# Patient Record
Sex: FEMALE | Race: WHITE | NOT HISPANIC OR LATINO | URBAN - METROPOLITAN AREA
[De-identification: names, ages, dates, MRNs, and addresses within clinical notes are randomized per-mention and may not be internally consistent; named-entity substitution may affect disease eponyms.]

---

## 2017-06-13 ENCOUNTER — OUTPATIENT (OUTPATIENT)
Dept: OUTPATIENT SERVICES | Facility: HOSPITAL | Age: 82
LOS: 1 days | Discharge: HOME | End: 2017-06-13

## 2017-06-28 DIAGNOSIS — Z12.31 ENCOUNTER FOR SCREENING MAMMOGRAM FOR MALIGNANT NEOPLASM OF BREAST: ICD-10-CM

## 2018-04-11 ENCOUNTER — APPOINTMENT (OUTPATIENT)
Dept: OBGYN | Facility: CLINIC | Age: 83
End: 2018-04-11
Payer: MEDICARE

## 2018-04-11 VITALS
SYSTOLIC BLOOD PRESSURE: 100 MMHG | DIASTOLIC BLOOD PRESSURE: 60 MMHG | HEIGHT: 58 IN | WEIGHT: 183 LBS | BODY MASS INDEX: 38.41 KG/M2

## 2018-04-11 DIAGNOSIS — C06.9 MALIGNANT NEOPLASM OF MOUTH, UNSPECIFIED: ICD-10-CM

## 2018-04-11 LAB
BILIRUB UR QL STRIP: NORMAL
GLUCOSE UR-MCNC: NORMAL
HCG UR QL: NORMAL EU/DL
HGB UR QL STRIP.AUTO: NORMAL
KETONES UR-MCNC: NORMAL
LEUKOCYTE ESTERASE UR QL STRIP: 25
NITRITE UR QL STRIP: NORMAL
PH UR STRIP: 5
PROT UR STRIP-MCNC: NORMAL
SP GR UR STRIP: 1.02

## 2018-04-11 PROCEDURE — G0101: CPT

## 2018-04-11 PROCEDURE — 81003 URINALYSIS AUTO W/O SCOPE: CPT | Mod: QW

## 2018-06-18 ENCOUNTER — FORM ENCOUNTER (OUTPATIENT)
Age: 83
End: 2018-06-18

## 2018-06-19 ENCOUNTER — OUTPATIENT (OUTPATIENT)
Dept: OUTPATIENT SERVICES | Facility: HOSPITAL | Age: 83
LOS: 1 days | Discharge: HOME | End: 2018-06-19

## 2018-06-19 DIAGNOSIS — Z12.31 ENCOUNTER FOR SCREENING MAMMOGRAM FOR MALIGNANT NEOPLASM OF BREAST: ICD-10-CM

## 2018-12-05 ENCOUNTER — OUTPATIENT (OUTPATIENT)
Dept: OUTPATIENT SERVICES | Facility: HOSPITAL | Age: 83
LOS: 1 days | Discharge: HOME | End: 2018-12-05

## 2018-12-05 DIAGNOSIS — R79.9 ABNORMAL FINDING OF BLOOD CHEMISTRY, UNSPECIFIED: ICD-10-CM

## 2019-01-28 ENCOUNTER — OUTPATIENT (OUTPATIENT)
Dept: OUTPATIENT SERVICES | Facility: HOSPITAL | Age: 84
LOS: 1 days | Discharge: HOME | End: 2019-01-28

## 2019-01-28 DIAGNOSIS — D64.9 ANEMIA, UNSPECIFIED: ICD-10-CM

## 2019-04-22 ENCOUNTER — OUTPATIENT (OUTPATIENT)
Dept: OUTPATIENT SERVICES | Facility: HOSPITAL | Age: 84
LOS: 1 days | Discharge: HOME | End: 2019-04-22

## 2019-04-22 DIAGNOSIS — D64.9 ANEMIA, UNSPECIFIED: ICD-10-CM

## 2019-05-14 ENCOUNTER — APPOINTMENT (OUTPATIENT)
Dept: OBGYN | Facility: CLINIC | Age: 84
End: 2019-05-14

## 2019-07-07 ENCOUNTER — FORM ENCOUNTER (OUTPATIENT)
Age: 84
End: 2019-07-07

## 2019-07-08 ENCOUNTER — OUTPATIENT (OUTPATIENT)
Dept: OUTPATIENT SERVICES | Facility: HOSPITAL | Age: 84
LOS: 1 days | Discharge: HOME | End: 2019-07-08
Payer: MEDICARE

## 2019-07-08 DIAGNOSIS — Z12.31 ENCOUNTER FOR SCREENING MAMMOGRAM FOR MALIGNANT NEOPLASM OF BREAST: ICD-10-CM

## 2019-07-08 PROCEDURE — 77067 SCR MAMMO BI INCL CAD: CPT | Mod: 26

## 2019-07-08 PROCEDURE — 77063 BREAST TOMOSYNTHESIS BI: CPT | Mod: 26

## 2020-06-23 ENCOUNTER — APPOINTMENT (OUTPATIENT)
Dept: OBGYN | Facility: CLINIC | Age: 85
End: 2020-06-23
Payer: MEDICARE

## 2020-06-23 VITALS
BODY MASS INDEX: 40.76 KG/M2 | SYSTOLIC BLOOD PRESSURE: 120 MMHG | WEIGHT: 195 LBS | TEMPERATURE: 98 F | DIASTOLIC BLOOD PRESSURE: 80 MMHG

## 2020-06-23 DIAGNOSIS — Z01.419 ENCOUNTER FOR GYNECOLOGICAL EXAMINATION (GENERAL) (ROUTINE) W/OUT ABNORMAL FINDINGS: ICD-10-CM

## 2020-06-23 LAB
BILIRUB UR QL STRIP: NORMAL
GLUCOSE UR-MCNC: NORMAL
HCG UR QL: 1 EU/DL
HGB UR QL STRIP.AUTO: NORMAL
KETONES UR-MCNC: NORMAL
LEUKOCYTE ESTERASE UR QL STRIP: NORMAL
NITRITE UR QL STRIP: NORMAL
PH UR STRIP: 6
PROT UR STRIP-MCNC: NORMAL
SP GR UR STRIP: 1.02

## 2020-06-23 PROCEDURE — 81003 URINALYSIS AUTO W/O SCOPE: CPT | Mod: QW

## 2020-06-23 PROCEDURE — G0101: CPT

## 2020-06-23 RX ORDER — UBIDECARENONE/VIT E ACET 100MG-5
CAPSULE ORAL
Refills: 0 | Status: COMPLETED | COMMUNITY
End: 2020-06-23

## 2020-06-23 NOTE — HISTORY OF PRESENT ILLNESS
[Last Mammogram ___] : Last Mammogram was [unfilled] [Last Pap ___] : Last cervical pap smear was [unfilled] [Last Bone Density ___] : Last bone density studies [unfilled] [Last Colonoscopy ___] : Last colonoscopy [unfilled]

## 2020-06-23 NOTE — PHYSICAL EXAM
[Alert] : alert [Awake] : awake [Mass] : no breast mass [Acute Distress] : no acute distress [Axillary LAD] : no axillary lymphadenopathy [Nipple Discharge] : no nipple discharge [Tender] : non tender [Soft] : soft [Distended] : not distended [Oriented x3] : oriented to person, place, and time [Depressed Mood] : not depressed [Normal] : uterus [Atrophy] : atrophy [RRR, No Murmurs] : RRR, no murmurs [CTAB] : CTAB

## 2020-06-29 ENCOUNTER — APPOINTMENT (OUTPATIENT)
Dept: OBGYN | Facility: CLINIC | Age: 85
End: 2020-06-29

## 2020-07-15 ENCOUNTER — RESULT REVIEW (OUTPATIENT)
Age: 85
End: 2020-07-15

## 2020-07-15 ENCOUNTER — OUTPATIENT (OUTPATIENT)
Dept: OUTPATIENT SERVICES | Facility: HOSPITAL | Age: 85
LOS: 1 days | Discharge: HOME | End: 2020-07-15
Payer: MEDICARE

## 2020-07-15 DIAGNOSIS — Z12.31 ENCOUNTER FOR SCREENING MAMMOGRAM FOR MALIGNANT NEOPLASM OF BREAST: ICD-10-CM

## 2020-07-15 PROCEDURE — 77063 BREAST TOMOSYNTHESIS BI: CPT | Mod: 26

## 2020-07-15 PROCEDURE — 77067 SCR MAMMO BI INCL CAD: CPT | Mod: 26

## 2021-05-01 ENCOUNTER — INPATIENT (INPATIENT)
Facility: HOSPITAL | Age: 86
LOS: 2 days | Discharge: HOME HEALTH | End: 2021-05-04
Attending: HOSPITALIST | Admitting: HOSPITALIST
Payer: MEDICARE

## 2021-05-01 VITALS
SYSTOLIC BLOOD PRESSURE: 182 MMHG | OXYGEN SATURATION: 97 % | TEMPERATURE: 98 F | DIASTOLIC BLOOD PRESSURE: 102 MMHG | RESPIRATION RATE: 17 BRPM | WEIGHT: 190.04 LBS | HEART RATE: 89 BPM

## 2021-05-01 LAB
ALBUMIN SERPL ELPH-MCNC: 4 G/DL — SIGNIFICANT CHANGE UP (ref 3.5–5.2)
ALP SERPL-CCNC: 120 U/L — HIGH (ref 30–115)
ALT FLD-CCNC: 13 U/L — SIGNIFICANT CHANGE UP (ref 0–41)
ANION GAP SERPL CALC-SCNC: 10 MMOL/L — SIGNIFICANT CHANGE UP (ref 7–14)
APTT BLD: 31.4 SEC — SIGNIFICANT CHANGE UP (ref 27–39.2)
AST SERPL-CCNC: 23 U/L — SIGNIFICANT CHANGE UP (ref 0–41)
BASOPHILS # BLD AUTO: 0.04 K/UL — SIGNIFICANT CHANGE UP (ref 0–0.2)
BASOPHILS NFR BLD AUTO: 0.7 % — SIGNIFICANT CHANGE UP (ref 0–1)
BILIRUB SERPL-MCNC: 0.4 MG/DL — SIGNIFICANT CHANGE UP (ref 0.2–1.2)
BUN SERPL-MCNC: 20 MG/DL — SIGNIFICANT CHANGE UP (ref 10–20)
CALCIUM SERPL-MCNC: 9.5 MG/DL — SIGNIFICANT CHANGE UP (ref 8.5–10.1)
CHLORIDE SERPL-SCNC: 103 MMOL/L — SIGNIFICANT CHANGE UP (ref 98–110)
CO2 SERPL-SCNC: 24 MMOL/L — SIGNIFICANT CHANGE UP (ref 17–32)
CREAT SERPL-MCNC: 0.8 MG/DL — SIGNIFICANT CHANGE UP (ref 0.7–1.5)
EOSINOPHIL # BLD AUTO: 0.08 K/UL — SIGNIFICANT CHANGE UP (ref 0–0.7)
EOSINOPHIL NFR BLD AUTO: 1.3 % — SIGNIFICANT CHANGE UP (ref 0–8)
GLUCOSE SERPL-MCNC: 104 MG/DL — HIGH (ref 70–99)
HCT VFR BLD CALC: 38.9 % — SIGNIFICANT CHANGE UP (ref 37–47)
HGB BLD-MCNC: 12.4 G/DL — SIGNIFICANT CHANGE UP (ref 12–16)
IMM GRANULOCYTES NFR BLD AUTO: 0.2 % — SIGNIFICANT CHANGE UP (ref 0.1–0.3)
INR BLD: 1.07 RATIO — SIGNIFICANT CHANGE UP (ref 0.65–1.3)
LACTATE SERPL-SCNC: 0.9 MMOL/L — SIGNIFICANT CHANGE UP (ref 0.7–2)
LYMPHOCYTES # BLD AUTO: 1.52 K/UL — SIGNIFICANT CHANGE UP (ref 1.2–3.4)
LYMPHOCYTES # BLD AUTO: 25.3 % — SIGNIFICANT CHANGE UP (ref 20.5–51.1)
MCHC RBC-ENTMCNC: 29.3 PG — SIGNIFICANT CHANGE UP (ref 27–31)
MCHC RBC-ENTMCNC: 31.9 G/DL — LOW (ref 32–37)
MCV RBC AUTO: 92 FL — SIGNIFICANT CHANGE UP (ref 81–99)
MONOCYTES # BLD AUTO: 0.59 K/UL — SIGNIFICANT CHANGE UP (ref 0.1–0.6)
MONOCYTES NFR BLD AUTO: 9.8 % — HIGH (ref 1.7–9.3)
NEUTROPHILS # BLD AUTO: 3.76 K/UL — SIGNIFICANT CHANGE UP (ref 1.4–6.5)
NEUTROPHILS NFR BLD AUTO: 62.7 % — SIGNIFICANT CHANGE UP (ref 42.2–75.2)
NRBC # BLD: 0 /100 WBCS — SIGNIFICANT CHANGE UP (ref 0–0)
NT-PROBNP SERPL-SCNC: 2587 PG/ML — HIGH (ref 0–300)
PLATELET # BLD AUTO: 149 K/UL — SIGNIFICANT CHANGE UP (ref 130–400)
POTASSIUM SERPL-MCNC: 4.6 MMOL/L — SIGNIFICANT CHANGE UP (ref 3.5–5)
POTASSIUM SERPL-SCNC: 4.6 MMOL/L — SIGNIFICANT CHANGE UP (ref 3.5–5)
PROT SERPL-MCNC: 6.9 G/DL — SIGNIFICANT CHANGE UP (ref 6–8)
PROTHROM AB SERPL-ACNC: 12.3 SEC — SIGNIFICANT CHANGE UP (ref 9.95–12.87)
RBC # BLD: 4.23 M/UL — SIGNIFICANT CHANGE UP (ref 4.2–5.4)
RBC # FLD: 14.2 % — SIGNIFICANT CHANGE UP (ref 11.5–14.5)
SARS-COV-2 RNA SPEC QL NAA+PROBE: SIGNIFICANT CHANGE UP
SODIUM SERPL-SCNC: 137 MMOL/L — SIGNIFICANT CHANGE UP (ref 135–146)
TROPONIN T SERPL-MCNC: <0.01 NG/ML — SIGNIFICANT CHANGE UP
WBC # BLD: 6 K/UL — SIGNIFICANT CHANGE UP (ref 4.8–10.8)
WBC # FLD AUTO: 6 K/UL — SIGNIFICANT CHANGE UP (ref 4.8–10.8)

## 2021-05-01 PROCEDURE — 71046 X-RAY EXAM CHEST 2 VIEWS: CPT | Mod: 26

## 2021-05-01 PROCEDURE — 99285 EMERGENCY DEPT VISIT HI MDM: CPT | Mod: CS

## 2021-05-01 PROCEDURE — 93010 ELECTROCARDIOGRAM REPORT: CPT

## 2021-05-01 PROCEDURE — 99223 1ST HOSP IP/OBS HIGH 75: CPT | Mod: AI

## 2021-05-01 RX ORDER — SIMVASTATIN 20 MG/1
20 TABLET, FILM COATED ORAL AT BEDTIME
Refills: 0 | Status: DISCONTINUED | OUTPATIENT
Start: 2021-05-01 | End: 2021-05-04

## 2021-05-01 RX ORDER — FUROSEMIDE 40 MG
20 TABLET ORAL ONCE
Refills: 0 | Status: COMPLETED | OUTPATIENT
Start: 2021-05-01 | End: 2021-05-01

## 2021-05-01 RX ORDER — ENOXAPARIN SODIUM 100 MG/ML
40 INJECTION SUBCUTANEOUS DAILY
Refills: 0 | Status: DISCONTINUED | OUTPATIENT
Start: 2021-05-01 | End: 2021-05-02

## 2021-05-01 RX ADMIN — ENOXAPARIN SODIUM 40 MILLIGRAM(S): 100 INJECTION SUBCUTANEOUS at 23:39

## 2021-05-01 RX ADMIN — Medication 20 MILLIGRAM(S): at 23:39

## 2021-05-01 NOTE — H&P ADULT - ATTENDING COMMENTS
HPI:  86 F PMH HLD presenting for dyspnea on exertion and cough X 1 week duration. Patient son (who is a physician) noted wheezing and an irregular heart beat. Patient has no previous cardiac history, never seen a cardiologist. She was started on Lasix, takes once a week for leg swelling. Was given to her by PCP.  Patient  recently passed away after lengthy dementia history. Patient was primary caretaker of . Initially her symptoms were attributed to possibly having a cold, but they did not resolve.    In ED, patient found to have new onset aflutter. Patient was rate controlled without intervention and hemodynamically stable. BNP 2600. Physical exam shows non pitting edema bilaterally. Crackles heard left lower lobe.   Patient being admitted for new onset a-flutter.   (01 May 2021 22:09)    REVIEW OF SYSTEMS: see cc/HPI   CONSTITUTIONAL: No weakness, fevers or chills  EYES/ENT: No visual changes;  No vertigo or throat pain   NECK: No pain or stiffness  RESPIRATORY: No cough, wheezing, hemoptysis; (+) shortness of breath/dypspnea w/ exertion  CARDIOVASCULAR: No chest pain or palpitations  GASTROINTESTINAL: No abdominal or epigastric pain. No nausea, vomiting, or hematemesis; No diarrhea or constipation. No melena or hematochezia.  GENITOURINARY: No dysuria, frequency or hematuria  NEUROLOGICAL: No numbness or weakness  SKIN: No itching, rashes    Physical Exam:   General: WN/WD NAD, Pleasant   Neurology: A&Ox3, nonfocal, follows commands  Eyes: PERRLA/ EOMI  ENT/Neck: Neck supple, trachea midline, No JVD  Respiratory: CTA B/L, No wheezing, rales, rhonchi  CV: Normal rate regular rhythm, S1S2, no murmurs, rubs or gallops  Abdominal: Soft, NT, ND +BS,   Extremities: Mild Pedal edema, + peripheral pulses  Skin: No Rashes, Hematoma, Ecchymosis  Incisions:   Tubes:    A/p  A, Flutter r/o MI   Dyspnea - mild HF? EF  2/2 A. Flutter   -Admit to tele  -Lovenox ---> Eliquis   -Cardiology / EP eval   -serial EKGs and Ekaterina   -2D echo   -agree w/ TSH and lipid panel   -Is and Os     H HPI:  86 F PMH HLD presenting for dyspnea on exertion and cough X 1 week duration. Patient son (who is a physician) noted wheezing and an irregular heart beat. Patient has no previous cardiac history, never seen a cardiologist. She was started on Lasix, takes once a week for leg swelling. Was given to her by PCP.  Patient  recently passed away after lengthy dementia history. Patient was primary caretaker of . Initially her symptoms were attributed to possibly having a cold, but they did not resolve.    In ED, patient found to have new onset aflutter. Patient was rate controlled without intervention and hemodynamically stable. BNP 2600. Physical exam shows non pitting edema bilaterally. Crackles heard left lower lobe.   Patient being admitted for new onset a-flutter.   (01 May 2021 22:09)    REVIEW OF SYSTEMS: see cc/HPI   CONSTITUTIONAL: No weakness, fevers or chills  EYES/ENT: No visual changes;  No vertigo or throat pain   NECK: No pain or stiffness  RESPIRATORY: No cough, wheezing, hemoptysis; (+) shortness of breath/dypspnea w/ exertion  CARDIOVASCULAR: No chest pain or palpitations  GASTROINTESTINAL: No abdominal or epigastric pain. No nausea, vomiting, or hematemesis; No diarrhea or constipation. No melena or hematochezia.  GENITOURINARY: No dysuria, frequency or hematuria  NEUROLOGICAL: No numbness or weakness  SKIN: No itching, rashes    Physical Exam:   General: WN/WD NAD, Pleasant   Neurology: A&Ox3, nonfocal, follows commands  Eyes: PERRLA/ EOMI  ENT/Neck: Neck supple, trachea midline, No JVD  Respiratory: CTA B/L, No wheezing, rales, rhonchi  CV: Regular rhythm - tele 70s -90, S1S2, no murmurs, rubs or gallops  Abdominal: Soft, NT, ND +BS,   Extremities: Mild Pedal edema and stocking rolled back, + peripheral pulses  Skin: No Rashes, Hematoma, Ecchymosis  Incisions: N/A  Tubes: N/A    A/p  A, Flutter r/o MI   Dyspnea - mild HF? EF  2/2 A. Flutter   -Admit to tele  -Lovenox ---> ?? Eliquis ( risk, benefit, indication and side effects of A/C d/w patient and son Dr. Remy Prescott - present at the bedside)   -Cardiology / EP eval   -serial EKGs and Ekaterina   -2D echo   -agree w/ TSH and lipid panel   -Is and Os / Daily weights /  NO need for acute aggressive diuresis    Dyslipidemia   - statin HPI:  86 F PMH HLD presenting for dyspnea on exertion and cough X 1 week duration. Patient son (who is a physician) noted wheezing and an irregular heart beat. Patient has no previous cardiac history, never seen a cardiologist. She was started on Lasix, takes once a week for leg swelling. Was given to her by PCP.  Patient  recently passed away after lengthy dementia history. Patient was primary caretaker of . Initially her symptoms were attributed to possibly having a cold, but they did not resolve.    In ED, patient found to have new onset aflutter. Patient was rate controlled without intervention and hemodynamically stable. BNP 2600. Physical exam shows non pitting edema bilaterally. Crackles heard left lower lobe.   Patient being admitted for new onset a-flutter.   (01 May 2021 22:09)    REVIEW OF SYSTEMS: see cc/HPI   CONSTITUTIONAL: No weakness, fevers or chills  EYES/ENT: No visual changes;  No vertigo or throat pain   NECK: No pain or stiffness  RESPIRATORY: No cough, wheezing, hemoptysis; (+) shortness of breath/dypspnea w/ exertion  CARDIOVASCULAR: No chest pain or palpitations  GASTROINTESTINAL: No abdominal or epigastric pain. No nausea, vomiting, or hematemesis; No diarrhea or constipation. No melena or hematochezia.  GENITOURINARY: No dysuria, frequency or hematuria  NEUROLOGICAL: No numbness or weakness  SKIN: No itching, rashes    Physical Exam:   General: WN/WD NAD, Pleasant   Neurology: A&Ox3, nonfocal, follows commands  Eyes: PERRLA/ EOMI  ENT/Neck: Neck supple, trachea midline, No JVD  Respiratory: CTA B/L, No wheezing, rales, rhonchi  CV: Regular rhythm - tele 70s -90, S1S2, no murmurs, rubs or gallops  Abdominal: Soft, NT, ND +BS,   Extremities: Mild Pedal edema and stocking rolled back, + peripheral pulses  Skin: No Rashes, Hematoma, Ecchymosis  Incisions: N/A  Tubes: N/A    A/p  A, Flutter r/o MI   Dyspnea - mild HF? EF  2/2 A. Flutter   -Admit to tele  -Lovenox ---> ?? Eliquis ( risk, benefit, indication and side effects of A/C d/w patient and son Dr. Remy Prescott - present at the bedside)   -Cardiology / EP eval   -serial EKGs and Ekaterina   -2D echo   -agree w/ TSH and lipid panel   -Is and Os / Daily weights /  NO need for acute aggressive diuresis    Dyslipidemia   - statin    Seen 05/01/21 - Son Dr. Prescott at bedside

## 2021-05-01 NOTE — GOALS OF CARE CONVERSATION - ADVANCED CARE PLANNING - CONVERSATION DETAILS
Discussed with patient and daughter her a-flutter. Was made aware that it could be an isolated rhythm problem, but there is a chance there could be an underlying cardiac problem such as heart failure. Was told that patient is currently stable, but given age should have wishes noted in case there is sudden clinical deterioration. Patient wishes to be FULL CODE, wants everything done to be kept alive

## 2021-05-01 NOTE — ED PROVIDER NOTE - CLINICAL SUMMARY MEDICAL DECISION MAKING FREE TEXT BOX
87 yo F presented to ED for cough and SOB. Patient found to have new onset a flutter. Concern for cardiac wheeze. Aflutter pathway initiated-cardiology consulted . Patient admitted to tele for further management.

## 2021-05-01 NOTE — H&P ADULT - NSHPPHYSICALEXAM_GEN_ALL_CORE
VITAL SIGNS: AFebrile, vital signs stable  CONSTITUTIONAL: Well-developed; well-nourished; in no acute distress  SKIN: Skin exam is warm and dry, no acute rash.  HEAD: Normocephalic; atraumatic.  EYES: Pupils equal round reactive to light, Extraocular movements intact; conjunctiva and sclera clear.  ENT: No nasal discharge; airway clear. Moist mucus membranes.  NECK: Supple; non tender. No rigidity  CARD: systolic murmur? ireggular rhythm, rate in 80s  RESP: mild crackles LLL  ABD: Abdomen soft; non-tender; non-distended  EXT: non pitting edema LE   NEURO: Alert and oriented x 3. No focal deficits.  PSYCH: Cooperative, appropriate.

## 2021-05-01 NOTE — ED ADULT NURSE REASSESSMENT NOTE - NS ED NURSE REASSESS COMMENT FT1
Patient report received from prior RN . Patient is on moniter ,denied any chest pain/palpitations only complains of cough .Son at bedside

## 2021-05-01 NOTE — H&P ADULT - NSHPREVIEWOFSYSTEMS_GEN_ALL_CORE
Review of Systems:  •	CONSTITUTIONAL - No fever  •	SKIN - No rash  •	EYES - No eye pain  •	ENT - No change in hearing  •	RESPIRATORY - dyspnea, wheezing, cough   •	CARDIAC -No chest pain, No palpitations  •	GI - No abdominal pain   •             - No dysuria, frequency, hematuria.   •	MUSCULOSKELETAL - No joint paint, No swelling, No back pain  •	NEUROLOGIC - No numbness, No focal weakness, No headache, No dizziness  All other systems negative, unless specified in HPI

## 2021-05-01 NOTE — H&P ADULT - ASSESSMENT
86 F PMH HLD presenting for dyspnea and cough X 1 week duration. Patient son (who is a physician) noted wheezing and an irregular heart beat. Patient has no previous cardiac history, never seen a cardiologist. In ED, patient found to have new onset aflutter. Patient was rate controlled and hemodynamically stable. BNP 2600. Patient being admitted for new onset a-flutter.    IMPRESSION   New Onset A-flutter  DLD    #New Onset K-noujjni-oomlboin unclear, no reported cardiac history, does had DLD and age as risk factors for cardiac disease. BNP elevated at admission and BALLARD noted in history, possible related to underlying CHF although euvolemic on exam. Hemodynamically stable at time of admission  -check TSH  -check TTE-would r/o underlying CHF as etiology  -first set troponin negative, would repeat another set  -cardiac EP consult  -tele monitoring  -CHADSVASC score 2; would await EP reccs to see if would benefit from long term anticoagulation      _____________________________________  DIET: Regular  DVT ppx-lovenox 40mg  AAT  dispo-from home, telemetry monitoring  86 F PMH HLD presenting for dyspnea and cough X 1 week duration. Patient son (who is a physician) noted wheezing and an irregular heart beat. Patient has no previous cardiac history, never seen a cardiologist. In ED, patient found to have new onset aflutter. Patient was rate controlled and hemodynamically stable. BNP 2600. Patient being admitted for new onset a-flutter.    IMPRESSION   New Onset A-flutter  DLD    #New Onset H-yhxeopt-luamfvhd unclear, no reported cardiac history, does had DLD and age as risk factors for cardiac disease. BNP elevated at admission and BALLARD noted in history, possible related to underlying CHF although euvolemic on exam. Hemodynamically stable at time of admission  -check TSH  -check TTE- would r/o underlying CHF as etiology. pt  recently passed, ?Takstubo cardiomyopathy. will give 20 IV lasix X 1   -intake and outtake   -first set troponin negative, would repeat another set  -cardiac EP consult  -tele monitoring  -CHADSVASC score 2; would await EP reccs to see if would benefit from long term anticoagulation    #HLD   -c/w simvastatin 20mg   _____________________________________  DIET: Regular  DVT ppx-lovenox 40mg  AAT  dispo-from home, telemetry monitoring     FULL CODE  86 F PMH HLD presenting for dyspnea and cough X 1 week duration. Patient son (who is a physician) noted wheezing and an irregular heart beat. Patient has no previous cardiac history, never seen a cardiologist. In ED, patient found to have new onset aflutter. Patient was rate controlled and hemodynamically stable. BNP 2600. Patient being admitted for new onset a-flutter.    IMPRESSION   New Onset A-flutter  DLD    #New Onset J-udyxyqm-qnbaqizl unclear, no reported cardiac history, does had DLD and age as risk factors for cardiac disease. BNP elevated at admission and BALLARD noted in history, possible related to underlying CHF although euvolemic on exam. Hemodynamically stable at time of admission  -check TSH  -check TTE- would r/o underlying CHF as etiology. pt  recently passed, ?Takstubo cardiomyopathy. will give 20 IV lasix X 1   -intake and outtake   -first set troponin negative, would repeat another set  -cardiac EP consult  -tele monitoring  -CHADSVASC score 2; would await EP reccs to see if would benefit from long term anticoagulation    #HLD   -c/w simvastatin 20mg   _____________________________________  DIET: DASH  DVT ppx-lovenox 40mg  AAT  dispo-from home, telemetry monitoring     FULL CODE  86 F PMH HLD presenting for dyspnea and cough X 1 week duration. Patient son (who is a physician) noted wheezing and an irregular heart beat. Patient has no previous cardiac history, never seen a cardiologist. In ED, patient found to have new onset aflutter. Patient was rate controlled and hemodynamically stable. BNP 2600. Patient being admitted for new onset a-flutter.    IMPRESSION   New Onset A-flutter  DLD    #New Onset Y-umxdrgd-cpeyiuzj unclear, no reported cardiac history, does had DLD and age as risk factors for cardiac disease. BNP elevated at admission and BALLARD noted in history, possible related to underlying CHF although euvolemic on exam. Hemodynamically stable at time of admission  -check TSH  -check lipid panel  -check A1c   -check TTE- would r/o underlying CHF as etiology. pt  recently passed, ?Takstubo cardiomyopathy. will give 20 IV lasix X 1   -intake and outtake   -first set troponin negative, would repeat another set  -cardiac EP consult  -tele monitoring  -CHADSVASC score 2; would await EP reccs to see if would benefit from long term anticoagulation    #HLD   -c/w simvastatin 20mg   _____________________________________  DIET: DASH  DVT ppx-lovenox 40mg  AAT  dispo-from home, telemetry monitoring     FULL CODE

## 2021-05-01 NOTE — ED ADULT NURSE NOTE - FINAL NURSING ELECTRONIC SIGNATURE
Spoke with patient regarding cold type symptoms  Patient reports have cold symptoms for approximately 2 week but the last 4-5 days symptoms have worsened   Reports feeling better today, still coughing with light green mucus, able to smell, reports its more in her head and not chest related  Denies fever, SOB, Wheezing, sore throat, no ear pain, denies history of sinus infection.  Reports taking sudafed-1 tablet daily, increase fluid intake  Patient reports she is going to [facility] same day to be seen just incase she needs an antibiotic, rule out sinus infection.   Patient advised to consult with Pharmacy on decongestant or a nasal spray if she wishes try.    Rosmery Aceves RN              01-May-2021 21:11

## 2021-05-01 NOTE — ED PROVIDER NOTE - PHYSICAL EXAMINATION
CONSTITUTIONAL: Well-appearing; well-nourished; in no apparent distress.   EYES: PERRL; EOM intact.   ENT: normal nose; no rhinorrhea; normal pharynx with no tonsillar hypertrophy.   NECK: Supple; non-tender; no cervical lymphadenopathy.   CARDIOVASCULAR: Normal S1, S2; no murmurs, rubs, or gallops.   RESPIRATORY: Normal chest excursion with respiration; breath sounds clear and equal bilaterally; no wheezes, rhonchi, or rales.  GI/: Normal bowel sounds; non-distended; non-tender; no palpable organomegaly.   MS: No evidence of trauma or deformity. Normal ROM in all four extremities; non-tender to palpation; distal pulses are normal.   Extrem: + b/l non pitting peripheral edema (unchanged). No calf ttp  SKIN: Normal for age and race; warm; dry; good turgor; no apparent lesions or exudate.   NEURO/PSYCH: A & O x 4; grossly unremarkable. mood and manner are appropriate.

## 2021-05-01 NOTE — H&P ADULT - HISTORY OF PRESENT ILLNESS
86 F PMH HLD presenting for dyspnea and cough X 1 week duration. Patient son (who is a physician) noted wheezing and an irregular heart beat. Patient has no previous cardiac history, never seen a cardiologist.     In ED, patient found to have new onset aflutter. Patient was rate controlled and hemodynamically stable. BNP 2600.   Patient being admitted for new onset a-flutter.    86 year old F with hx of HLD c/o dry cough and mild sob x 1 week. As per son has noticed she has been wheezing and has noted "irregular heart beat." Pt otherwise denies any other fever/chills, chest pain, worsening LE swelling/pain, recent travel, sick contact, smoking hx, cardiac hx. 86 F PMH HLD presenting for dyspnea on exertion and cough X 1 week duration. Patient son (who is a physician) noted wheezing and an irregular heart beat. Patient has no previous cardiac history, never seen a cardiologist. Patient  recently passed away after lengthy dementia history. Patient was primary caretaker of . Initially her symptoms were attributed to possibly having a cold, but they did not resolve.    In ED, patient found to have new onset aflutter. Patient was rate controlled without intervention and hemodynamically stable. BNP 2600. Physical exam shows non pitting edema bilaterally. Crackles heard left lower lobe.   Patient being admitted for new onset a-flutter.   86 F PMH HLD presenting for dyspnea on exertion and cough X 1 week duration. Patient son (who is a physician) noted wheezing and an irregular heart beat. Patient has no previous cardiac history, never seen a cardiologist. She was started on Lasix, takes once a week for leg swelling. Was given to her by PCP.  Patient  recently passed away after lengthy dementia history. Patient was primary caretaker of . Initially her symptoms were attributed to possibly having a cold, but they did not resolve.    In ED, patient found to have new onset aflutter. Patient was rate controlled without intervention and hemodynamically stable. BNP 2600. Physical exam shows non pitting edema bilaterally. Crackles heard left lower lobe.   Patient being admitted for new onset a-flutter.

## 2021-05-01 NOTE — ED PROVIDER NOTE - ATTENDING CONTRIBUTION TO CARE
87 yo F with PMH of HLD presents to ED for cough and SOB x1 week. Patient's son noticed that she was wheezing and listened to her heart and noticed an irregular heart beat. No fever, or chills. She had a COVID test today which is pending. She received both doses of her COVID vaccine.     Const: Well nourished, well developed, appears stated age  Eyes: PERRL, no conjunctival injection  HENT:  Neck supple without meningismus   CV: RRR, Warm, well-perfused extremities  RESP: wheezes, no tachypnea   GI: soft, non-tender, non-distended  MSK: No gross deformities appreciated  Skin: Warm, dry. No rashes  Neuro: Alert, CNs II-XII grossly intact. Sensation and motor function of extremities grossly intact.  Psych: Appropriate mood and affect.    Will do EKG, CXR, labs

## 2021-05-01 NOTE — ED ADULT NURSE NOTE - OBJECTIVE STATEMENT
came in from home  c/o dry cough and mild sob x 1 week. As per son he noticed she has been wheezing and has noted "irregular heart beat. patient denied any chest pain /palpitation or dizziness

## 2021-05-01 NOTE — ED PROVIDER NOTE - NS ED ROS FT
Constitutional: no fever, chills, no recent weight loss, change in appetite or malaise  Eyes: no redness/discharge/pain/vision changes  ENT: no rhinorrhea/ear pain/sore throat  Cardiac: No chest pain or edema.  Respiratory: + cough and mild sob. No respiratory distress  GI: No nausea, vomiting, diarrhea or abdominal pain.  : No dysuria, frequency, urgency or hematuria  MS: no pain to back or extremities, no loss of ROM, no weakness  Neuro: No headache or weakness. No LOC.  Extrem: No LE pain/ worsening swelling  Skin: No skin rash.  Endocrine: No history of thyroid disease or diabetes.  Except as documented in the HPI, all other systems are negative.

## 2021-05-01 NOTE — ED PROVIDER NOTE - OBJECTIVE STATEMENT
86 year old F with hx of HLD c/o dry cough and mild sob x 1 week. As per son has noticed she has been wheezing and has noted "irregular heart beat." Pt otherwise denies any other fever/chills, chest pain, worsening LE swelling/pain, recent travel, sick contact, smoking hx, cardiac hx.

## 2021-05-02 LAB
ALBUMIN SERPL ELPH-MCNC: 3.8 G/DL — SIGNIFICANT CHANGE UP (ref 3.5–5.2)
ALP SERPL-CCNC: 117 U/L — HIGH (ref 30–115)
ALT FLD-CCNC: 13 U/L — SIGNIFICANT CHANGE UP (ref 0–41)
ANION GAP SERPL CALC-SCNC: 12 MMOL/L — SIGNIFICANT CHANGE UP (ref 7–14)
AST SERPL-CCNC: 20 U/L — SIGNIFICANT CHANGE UP (ref 0–41)
BASOPHILS # BLD AUTO: 0.05 K/UL — SIGNIFICANT CHANGE UP (ref 0–0.2)
BASOPHILS NFR BLD AUTO: 1 % — SIGNIFICANT CHANGE UP (ref 0–1)
BILIRUB SERPL-MCNC: 0.6 MG/DL — SIGNIFICANT CHANGE UP (ref 0.2–1.2)
BUN SERPL-MCNC: 18 MG/DL — SIGNIFICANT CHANGE UP (ref 10–20)
CALCIUM SERPL-MCNC: 9.6 MG/DL — SIGNIFICANT CHANGE UP (ref 8.5–10.1)
CHLORIDE SERPL-SCNC: 99 MMOL/L — SIGNIFICANT CHANGE UP (ref 98–110)
CHOLEST SERPL-MCNC: 172 MG/DL — SIGNIFICANT CHANGE UP
CO2 SERPL-SCNC: 26 MMOL/L — SIGNIFICANT CHANGE UP (ref 17–32)
CREAT SERPL-MCNC: 0.8 MG/DL — SIGNIFICANT CHANGE UP (ref 0.7–1.5)
EOSINOPHIL # BLD AUTO: 0.09 K/UL — SIGNIFICANT CHANGE UP (ref 0–0.7)
EOSINOPHIL NFR BLD AUTO: 1.7 % — SIGNIFICANT CHANGE UP (ref 0–8)
GLUCOSE SERPL-MCNC: 101 MG/DL — HIGH (ref 70–99)
HCT VFR BLD CALC: 38.3 % — SIGNIFICANT CHANGE UP (ref 37–47)
HDLC SERPL-MCNC: 64 MG/DL — SIGNIFICANT CHANGE UP
HGB BLD-MCNC: 12.4 G/DL — SIGNIFICANT CHANGE UP (ref 12–16)
IMM GRANULOCYTES NFR BLD AUTO: 0.2 % — SIGNIFICANT CHANGE UP (ref 0.1–0.3)
LIPID PNL WITH DIRECT LDL SERPL: 96 MG/DL — SIGNIFICANT CHANGE UP
LYMPHOCYTES # BLD AUTO: 1.5 K/UL — SIGNIFICANT CHANGE UP (ref 1.2–3.4)
LYMPHOCYTES # BLD AUTO: 28.6 % — SIGNIFICANT CHANGE UP (ref 20.5–51.1)
MAGNESIUM SERPL-MCNC: 1.8 MG/DL — SIGNIFICANT CHANGE UP (ref 1.8–2.4)
MCHC RBC-ENTMCNC: 29.6 PG — SIGNIFICANT CHANGE UP (ref 27–31)
MCHC RBC-ENTMCNC: 32.4 G/DL — SIGNIFICANT CHANGE UP (ref 32–37)
MCV RBC AUTO: 91.4 FL — SIGNIFICANT CHANGE UP (ref 81–99)
MONOCYTES # BLD AUTO: 0.59 K/UL — SIGNIFICANT CHANGE UP (ref 0.1–0.6)
MONOCYTES NFR BLD AUTO: 11.2 % — HIGH (ref 1.7–9.3)
NEUTROPHILS # BLD AUTO: 3.01 K/UL — SIGNIFICANT CHANGE UP (ref 1.4–6.5)
NEUTROPHILS NFR BLD AUTO: 57.3 % — SIGNIFICANT CHANGE UP (ref 42.2–75.2)
NON HDL CHOLESTEROL: 108 MG/DL — SIGNIFICANT CHANGE UP
NRBC # BLD: 0 /100 WBCS — SIGNIFICANT CHANGE UP (ref 0–0)
PLATELET # BLD AUTO: 159 K/UL — SIGNIFICANT CHANGE UP (ref 130–400)
POTASSIUM SERPL-MCNC: 4.6 MMOL/L — SIGNIFICANT CHANGE UP (ref 3.5–5)
POTASSIUM SERPL-SCNC: 4.6 MMOL/L — SIGNIFICANT CHANGE UP (ref 3.5–5)
PROT SERPL-MCNC: 6.6 G/DL — SIGNIFICANT CHANGE UP (ref 6–8)
RBC # BLD: 4.19 M/UL — LOW (ref 4.2–5.4)
RBC # FLD: 14.2 % — SIGNIFICANT CHANGE UP (ref 11.5–14.5)
SODIUM SERPL-SCNC: 137 MMOL/L — SIGNIFICANT CHANGE UP (ref 135–146)
TRIGL SERPL-MCNC: 80 MG/DL — SIGNIFICANT CHANGE UP
TROPONIN T SERPL-MCNC: <0.01 NG/ML — SIGNIFICANT CHANGE UP
WBC # BLD: 5.25 K/UL — SIGNIFICANT CHANGE UP (ref 4.8–10.8)
WBC # FLD AUTO: 5.25 K/UL — SIGNIFICANT CHANGE UP (ref 4.8–10.8)

## 2021-05-02 PROCEDURE — 99222 1ST HOSP IP/OBS MODERATE 55: CPT

## 2021-05-02 PROCEDURE — 93306 TTE W/DOPPLER COMPLETE: CPT | Mod: 26

## 2021-05-02 RX ORDER — METOPROLOL TARTRATE 50 MG
25 TABLET ORAL
Refills: 0 | Status: DISCONTINUED | OUTPATIENT
Start: 2021-05-02 | End: 2021-05-04

## 2021-05-02 RX ORDER — FUROSEMIDE 40 MG
40 TABLET ORAL
Refills: 0 | Status: COMPLETED | OUTPATIENT
Start: 2021-05-02 | End: 2021-05-03

## 2021-05-02 RX ORDER — APIXABAN 2.5 MG/1
5 TABLET, FILM COATED ORAL
Refills: 0 | Status: DISCONTINUED | OUTPATIENT
Start: 2021-05-02 | End: 2021-05-04

## 2021-05-02 RX ADMIN — APIXABAN 5 MILLIGRAM(S): 2.5 TABLET, FILM COATED ORAL at 12:09

## 2021-05-02 RX ADMIN — SIMVASTATIN 20 MILLIGRAM(S): 20 TABLET, FILM COATED ORAL at 22:13

## 2021-05-02 RX ADMIN — APIXABAN 5 MILLIGRAM(S): 2.5 TABLET, FILM COATED ORAL at 22:13

## 2021-05-02 RX ADMIN — Medication 40 MILLIGRAM(S): at 12:10

## 2021-05-02 RX ADMIN — Medication 40 MILLIGRAM(S): at 22:13

## 2021-05-02 RX ADMIN — Medication 25 MILLIGRAM(S): at 12:10

## 2021-05-02 NOTE — CONSULT NOTE ADULT - SUBJECTIVE AND OBJECTIVE BOX
Patient is a 86y old  Female who presents with a chief complaint of new onsent A-flutter (02 May 2021 10:41)        HPI:    86 F PMH HLD presenting for dyspnea on exertion and cough X 1 week duration. Patient son (who is a physician) noted wheezing and an irregular heart beat. Patient has no previous cardiac history, never seen a cardiologist. She was started on Lasix, takes once a week for leg swelling. Was given to her by PCP.  Patient  recently passed away after lengthy dementia history. Patient was primary caretaker of . Initially her symptoms were attributed to possibly having a cold, but they did not resolve.    In ED, patient found to have new onset aflutter. Patient was rate controlled without intervention and hemodynamically stable. BNP 2600. Physical exam shows non pitting edema bilaterally. Crackles heard left lower lobe.       patient had been short of breath for couple weeks but family contributed to her exertion and taking care of her  who recently passed away. Patient came to the hospital and found to have new onset of typical H. aflutter.          PAST MEDICAL & SURGICAL HISTORY:        PREVIOUS DIAGNOSTIC TESTING:      ECHO - pending  FINDINGS:    STRESS  FINDINGS:    CATHETERIZATION  FINDINGS:    ELECTROPHYSIOLOGY STUDY  FINDINGS:    CAROTID ULTRASOUND:  FINDINGS    VENOUS DUPLEX SCAN:  FINDINGS:    CHEST CT PULMONARY ANGIO with IV Contrast:  FINDINGS:    MEDICATIONS  (STANDING):  apixaban 5 milliGRAM(s) Oral two times a day  furosemide   Injectable 40 milliGRAM(s) IV Push two times a day  metoprolol tartrate 25 milliGRAM(s) Oral two times a day  simvastatin 20 milliGRAM(s) Oral at bedtime    MEDICATIONS  (PRN):      FAMILY HISTORY: no SCD      SOCIAL HISTORY:     CIGARETTES: no    ALCOHOL: no    Past Surgical History: none     Allergies:    No Known Allergies      REVIEW OF SYSTEMS:    CONSTITUTIONAL: No fever, weight loss, chills, shakes, or fatigue  EYES: No eye pain, visual disturbances, or discharge  ENMT:  No difficulty hearing, tinnitus, vertigo; No sinus or throat pain  NECK: No pain or stiffness  BREASTS: No pain, masses, or nipple discharge  RESPIRATORY: No cough, wheezing, hemoptysis, or shortness of breath  CARDIOVASCULAR: No chest pain, dyspnea, palpitations, dizziness, syncope, paroxysmal nocturnal dyspnea, orthopnea, or arm or leg swelling  GASTROINTESTINAL: No abdominal  or epigastric pain, nausea, vomiting, hematemesis, diarrhea, constipation, melena or bright red blood.  GENITOURINARY: No dysuria, nocturia, hematuria, or urinary incontinence  NEUROLOGICAL: No headaches, memory loss, slurred speech, limb weakness, loss of strength, numbness, or tremors  SKIN: No itching, burning, rashes, or lesions   LYMPH NODES: No enlarged glands  ENDOCRINE: No heat or cold intolerance, or hair loss  MUSCULOSKELETAL: No joint pain or swelling, muscle, back, or extremity pain  PSYCHIATRIC: No depression, anxiety, or difficulty sleeping  HEME/LYMPH: No easy bruising or bleeding gums  ALLERY AND IMMUNOLOGIC: No hives or rash.      Vital Signs Last 24 Hrs  T(C): 36.7 (02 May 2021 04:55), Max: 36.7 (02 May 2021 04:55)  T(F): 98 (02 May 2021 04:55), Max: 98 (02 May 2021 04:55)  HR: 89 (02 May 2021 04:55) (88 - 90)  BP: 165/83 (02 May 2021 04:55) (133/94 - 182/102)  BP(mean): 103 (01 May 2021 19:53) (103 - 103)  RR: 18 (02 May 2021 04:55) (17 - 18)  SpO2: 99% (01 May 2021 19:53) (97% - 99%)    PHYSICAL EXAM:        GENERAL: In no apparent distress, well nourished, and hydrated.  HEAD:  Atraumatic, Normocephalic  EYES: EOMI, PERRLA, conjunctiva and sclera clear  ENMT: No tonsillar erythema, exudates, or enlargements; ist mucous membranes, Good dentition, No lesions  NECK: Supple and normal thyroid.  No JVD or carotid bruit.  Carotid pulse is 2+ bilaterally.  HEART: Regular rate and rhythm; No murmurs, rubs, or gallops.  PULMONARY: Clear to auscultation and perfusion.  No rales, wheezing, or rhonchi bilaterally.  ABDOMEN: Soft, Nontender, Nondistended; Bowel sounds present  EXTREMITIES:  2+ Peripheral Pulses, No clubbing, cyanosis, or edema  NEUROLOGICAL: Grossly nonfocal      INTERPRETATION OF TELEMETRY:    ECG:    eg< from: 12 Lead ECG (05.01.21 @ 17:35) >    Ventricular Rate 92 BPM    Atrial Rate 276 BPM    QRS Duration 92 ms    Q-T Interval 418 ms    QTC Calculation(Bazett) 516 ms    P Axis 74 degrees    R Axis 88 degrees    T Axis 51 degrees    Diagnosis Line Atrial flutter with 3:1 A-V conduction  Abnormal ECG    Confirmed by Cecily Ventura MD ([id number]) on 5/1/2021 11:28:38 PM    < end of copied text >      I&O's Detail    01 May 2021 07:01  -  02 May 2021 07:00  --------------------------------------------------------  IN:  Total IN: 0 mL    OUT:    Voided (mL): 2000 mL  Total OUT: 2000 mL    Total NET: -2000 mL      02 May 2021 07:01  -  02 May 2021 12:05  --------------------------------------------------------  IN:  Total IN: 0 mL    OUT:    Voided (mL): 200 mL  Total OUT: 200 mL    Total NET: -200 mL          LABS:                        12.4   5.25  )-----------( 159      ( 02 May 2021 05:46 )             38.3     05-02    137  |  99  |  18  ----------------------------<  101<H>  4.6   |  26  |  0.8    Ca    9.6      02 May 2021 05:46  Mg     1.8     05-02    TPro  6.6  /  Alb  3.8  /  TBili  0.6  /  DBili  x   /  AST  20  /  ALT  13  /  AlkPhos  117<H>  05-02    CARDIAC MARKERS ( 02 May 2021 05:46 )  x     / <0.01 ng/mL / x     / x     / x      CARDIAC MARKERS ( 01 May 2021 18:58 )  x     / <0.01 ng/mL / x     / x     / x          PT/INR - ( 01 May 2021 18:58 )   PT: 12.30 sec;   INR: 1.07 ratio         PTT - ( 01 May 2021 18:58 )  PTT:31.4 sec    BNPSerum Pro-Brain Natriuretic Peptide: 2587 pg/mL (05-01 @ 18:58)    I&O's Detail    01 May 2021 07:01  -  02 May 2021 07:00  --------------------------------------------------------  IN:  Total IN: 0 mL    OUT:    Voided (mL): 2000 mL  Total OUT: 2000 mL    Total NET: -2000 mL      02 May 2021 07:01  -  02 May 2021 12:05  --------------------------------------------------------  IN:  Total IN: 0 mL    OUT:    Voided (mL): 200 mL  Total OUT: 200 mL    Total NET: -200 mL            RADIOLOGY & ADDITIONAL STUDIES:

## 2021-05-02 NOTE — CONSULT NOTE ADULT - ASSESSMENT
typical AFL  - CHADVSC 3; agree with anticoagulation  -I have discussed with patient and family different options for treatment of a. flutter. I have offered patient cardioversion or a.flutter ablation. I discussed the risks and benefits of each. Family stated she will elect to discuss the and will make the decision..

## 2021-05-02 NOTE — PHYSICAL THERAPY INITIAL EVALUATION ADULT - GAIT TRAINING, PT EVAL
Goal: amb with straight cane 150' require supervision, navigate 10 steps with HR require cg by discharge

## 2021-05-02 NOTE — PROGRESS NOTE ADULT - SUBJECTIVE AND OBJECTIVE BOX
LENGTH OF HOSPITAL STAY: 1d    CHIEF COMPLAINT:   Patient is a 86y old  Female who presents with a chief complaint of new onsent A-flutter (01 May 2021 22:09)      HISTORY OF PRESENTING ILLNESS:    HPI:  86 F PMH HLD presenting for dyspnea on exertion and cough X 1 week duration. Patient son (who is a physician) noted wheezing and an irregular heart beat. Patient has no previous cardiac history, never seen a cardiologist. She was started on Lasix, takes once a week for leg swelling. Was given to her by PCP.  Patient  recently passed away after lengthy dementia history. Patient was primary caretaker of . Initially her symptoms were attributed to possibly having a cold, but they did not resolve.    In ED, patient found to have new onset aflutter. Patient was rate controlled without intervention and hemodynamically stable. BNP 2600. Physical exam shows non pitting edema bilaterally. Crackles heard left lower lobe.   Patient being admitted for new onset a-flutter.   (01 May 2021 22:09)    PAST MEDICAL & SURGICAL HISTORY  PAST MEDICAL & SURGICAL HISTORY:    SOCIAL HISTORY:    ALLERGIES:  No Known Allergies    MEDICATIONS:  STANDING MEDICATIONS  enoxaparin Injectable 40 milliGRAM(s) SubCutaneous daily  furosemide   Injectable 40 milliGRAM(s) IV Push two times a day  metoprolol tartrate 25 milliGRAM(s) Oral two times a day  simvastatin 20 milliGRAM(s) Oral at bedtime    PRN MEDICATIONS    VITALS:   T(F): 98  HR: 89  BP: 165/83  RR: 18  SpO2: 99%    LABS:                        12.4   5.25  )-----------( 159      ( 02 May 2021 05:46 )             38.3     05-02    137  |  99  |  18  ----------------------------<  101<H>  4.6   |  26  |  0.8    Ca    9.6      02 May 2021 05:46  Mg     1.8     05-02    TPro  6.6  /  Alb  3.8  /  TBili  0.6  /  DBili  x   /  AST  20  /  ALT  13  /  AlkPhos  117<H>  05-02    PT/INR - ( 01 May 2021 18:58 )   PT: 12.30 sec;   INR: 1.07 ratio         PTT - ( 01 May 2021 18:58 )  PTT:31.4 sec      Troponin T, Serum: <0.01 ng/mL (05-02-21 @ 05:46)  Troponin T, Serum: <0.01 ng/mL (05-01-21 @ 18:58)  Lactate, Blood: 0.9 mmol/L (05-01-21 @ 18:56)      CARDIAC MARKERS ( 02 May 2021 05:46 )  x     / <0.01 ng/mL / x     / x     / x      CARDIAC MARKERS ( 01 May 2021 18:58 )  x     / <0.01 ng/mL / x     / x     / x          RADIOLOGY:  < from: Xray Chest 2 Views (05.01.21 @ 18:46) >  Impression:    Faint bilateral parenchymal opacities.        < end of copied text >    PHYSICAL EXAM:  GEN: No acute distress  HEENT: AT, NC, NAMRATA  LUNGS: decreased breath sounds bilaterally  HEART: S1/S2 present. RRR.   ABD: Soft, non-tender, non-distended. Bowel sounds present  EXT: edema, cyanosis, clubbing absent  NEURO: AAOX3

## 2021-05-02 NOTE — PHYSICAL THERAPY INITIAL EVALUATION ADULT - PERTINENT HX OF CURRENT PROBLEM, REHAB EVAL
86 F PMH HLD presenting for dyspnea and cough X 1 week duration. Patient son (who is a physician) noted wheezing and an irregular heart beat. Patient has no previous cardiac history, never seen a cardiologist. In ED, patient found to have new onset aflutter. Patient was rate controlled and hemodynamically stable. BNP 2600. Patient being admitted for new onset a-flutter.
86 F PMH HLD presenting for dyspnea and cough X 1 week duration. Patient son (who is a physician) noted wheezing and an irregular heart beat. Patient has no previous cardiac history, never seen a cardiologist. In ED, patient found to have new onset aflutter. Patient was rate controlled and hemodynamically stable. BNP 2600. Patient being admitted for new onset a-flutter.

## 2021-05-03 LAB
A1C WITH ESTIMATED AVERAGE GLUCOSE RESULT: 6.2 % — HIGH (ref 4–5.6)
ANION GAP SERPL CALC-SCNC: 12 MMOL/L — SIGNIFICANT CHANGE UP (ref 7–14)
BUN SERPL-MCNC: 20 MG/DL — SIGNIFICANT CHANGE UP (ref 10–20)
CALCIUM SERPL-MCNC: 9.2 MG/DL — SIGNIFICANT CHANGE UP (ref 8.5–10.1)
CHLORIDE SERPL-SCNC: 94 MMOL/L — LOW (ref 98–110)
CO2 SERPL-SCNC: 32 MMOL/L — SIGNIFICANT CHANGE UP (ref 17–32)
COVID-19 SPIKE DOMAIN AB INTERP: POSITIVE
COVID-19 SPIKE DOMAIN ANTIBODY RESULT: >250 U/ML — HIGH
CREAT SERPL-MCNC: 1 MG/DL — SIGNIFICANT CHANGE UP (ref 0.7–1.5)
ESTIMATED AVERAGE GLUCOSE: 131 MG/DL — HIGH (ref 68–114)
GLUCOSE BLDC GLUCOMTR-MCNC: 101 MG/DL — HIGH (ref 70–99)
GLUCOSE SERPL-MCNC: 100 MG/DL — HIGH (ref 70–99)
HCT VFR BLD CALC: 40.1 % — SIGNIFICANT CHANGE UP (ref 37–47)
HGB BLD-MCNC: 13.2 G/DL — SIGNIFICANT CHANGE UP (ref 12–16)
MAGNESIUM SERPL-MCNC: 1.7 MG/DL — LOW (ref 1.8–2.4)
MCHC RBC-ENTMCNC: 29.5 PG — SIGNIFICANT CHANGE UP (ref 27–31)
MCHC RBC-ENTMCNC: 32.9 G/DL — SIGNIFICANT CHANGE UP (ref 32–37)
MCV RBC AUTO: 89.7 FL — SIGNIFICANT CHANGE UP (ref 81–99)
NRBC # BLD: 0 /100 WBCS — SIGNIFICANT CHANGE UP (ref 0–0)
PLATELET # BLD AUTO: 198 K/UL — SIGNIFICANT CHANGE UP (ref 130–400)
POTASSIUM SERPL-MCNC: 4.2 MMOL/L — SIGNIFICANT CHANGE UP (ref 3.5–5)
POTASSIUM SERPL-SCNC: 4.2 MMOL/L — SIGNIFICANT CHANGE UP (ref 3.5–5)
RBC # BLD: 4.47 M/UL — SIGNIFICANT CHANGE UP (ref 4.2–5.4)
RBC # FLD: 14.4 % — SIGNIFICANT CHANGE UP (ref 11.5–14.5)
SARS-COV-2 IGG+IGM SERPL QL IA: >250 U/ML — HIGH
SARS-COV-2 IGG+IGM SERPL QL IA: POSITIVE
SODIUM SERPL-SCNC: 138 MMOL/L — SIGNIFICANT CHANGE UP (ref 135–146)
TSH SERPL-MCNC: 1.49 UIU/ML — SIGNIFICANT CHANGE UP (ref 0.27–4.2)
WBC # BLD: 6.77 K/UL — SIGNIFICANT CHANGE UP (ref 4.8–10.8)
WBC # FLD AUTO: 6.77 K/UL — SIGNIFICANT CHANGE UP (ref 4.8–10.8)

## 2021-05-03 PROCEDURE — 92960 CARDIOVERSION ELECTRIC EXT: CPT

## 2021-05-03 PROCEDURE — 93312 ECHO TRANSESOPHAGEAL: CPT | Mod: 26

## 2021-05-03 PROCEDURE — 93325 DOPPLER ECHO COLOR FLOW MAPG: CPT | Mod: 26

## 2021-05-03 PROCEDURE — 99233 SBSQ HOSP IP/OBS HIGH 50: CPT

## 2021-05-03 PROCEDURE — 93320 DOPPLER ECHO COMPLETE: CPT | Mod: 26

## 2021-05-03 RX ORDER — MAGNESIUM SULFATE 500 MG/ML
2 VIAL (ML) INJECTION ONCE
Refills: 0 | Status: COMPLETED | OUTPATIENT
Start: 2021-05-03 | End: 2021-05-03

## 2021-05-03 RX ORDER — APIXABAN 2.5 MG/1
1 TABLET, FILM COATED ORAL
Qty: 60 | Refills: 0
Start: 2021-05-03

## 2021-05-03 RX ORDER — ACETAMINOPHEN 500 MG
650 TABLET ORAL ONCE
Refills: 0 | Status: COMPLETED | OUTPATIENT
Start: 2021-05-03 | End: 2021-05-03

## 2021-05-03 RX ADMIN — APIXABAN 5 MILLIGRAM(S): 2.5 TABLET, FILM COATED ORAL at 17:21

## 2021-05-03 RX ADMIN — Medication 40 MILLIGRAM(S): at 05:41

## 2021-05-03 RX ADMIN — Medication 50 GRAM(S): at 22:29

## 2021-05-03 RX ADMIN — Medication 650 MILLIGRAM(S): at 16:32

## 2021-05-03 RX ADMIN — Medication 650 MILLIGRAM(S): at 16:18

## 2021-05-03 RX ADMIN — APIXABAN 5 MILLIGRAM(S): 2.5 TABLET, FILM COATED ORAL at 05:47

## 2021-05-03 RX ADMIN — Medication 25 MILLIGRAM(S): at 05:47

## 2021-05-03 RX ADMIN — Medication 40 MILLIGRAM(S): at 17:21

## 2021-05-03 RX ADMIN — SIMVASTATIN 20 MILLIGRAM(S): 20 TABLET, FILM COATED ORAL at 21:38

## 2021-05-03 RX ADMIN — Medication 25 MILLIGRAM(S): at 17:21

## 2021-05-03 NOTE — PROGRESS NOTE ADULT - SUBJECTIVE AND OBJECTIVE BOX
24H events:    Patient is a 86y old Female who presents with a chief complaint of new onsent A-flutter (02 May 2021 12:04)    Primary diagnosis of Atrial flutter       Today is hospital day 2d.     PAST MEDICAL & SURGICAL HISTORY    SOCIAL HISTORY:  Negative for smoking/alcohol/drug use.     ALLERGIES:  No Known Allergies    MEDICATIONS:  STANDING MEDICATIONS  apixaban 5 milliGRAM(s) Oral two times a day  furosemide   Injectable 40 milliGRAM(s) IV Push two times a day  metoprolol tartrate 25 milliGRAM(s) Oral two times a day  simvastatin 20 milliGRAM(s) Oral at bedtime    PRN MEDICATIONS    VITALS:   T(F): 97.7  HR: 66  BP: 146/74  RR: 18  SpO2: 95%    LABS:                        12.4   5.25  )-----------( 159      ( 02 May 2021 05:46 )             38.3     05-03    138  |  94<L>  |  20  ----------------------------<  100<H>  4.2   |  32  |  1.0    Ca    9.2      03 May 2021 06:45  Mg     1.7     05-03    TPro  6.6  /  Alb  3.8  /  TBili  0.6  /  DBili  x   /  AST  20  /  ALT  13  /  AlkPhos  117<H>  05-02    PT/INR - ( 01 May 2021 18:58 )   PT: 12.30 sec;   INR: 1.07 ratio         PTT - ( 01 May 2021 18:58 )  PTT:31.4 sec          CARDIAC MARKERS ( 02 May 2021 05:46 )  x     / <0.01 ng/mL / x     / x     / x      CARDIAC MARKERS ( 01 May 2021 18:58 )  x     / <0.01 ng/mL / x     / x     / x            PHYSICAL EXAM:  GEN: No acute distress  HEENT: AT, NC, NAMRATA  LUNGS: decreased breath sounds bilaterally  HEART: S1/S2 present. RRR.   ABD: Soft, non-tender, non-distended. Bowel sounds present  EXT: edema, cyanosis, clubbing absent  NEURO: AAOX3     24H events:    Patient is a 86y old Female who presents with a chief complaint of new onsent A-flutter (02 May 2021 12:04)    Primary diagnosis of Atrial flutter       Today is hospital day 2d.   no cp, sob, abd pain, fever  no cp, palpitations, nicolas, orthopnea    PAST MEDICAL & SURGICAL HISTORY    SOCIAL HISTORY:  Negative for smoking/alcohol/drug use.     ALLERGIES:  No Known Allergies    MEDICATIONS:  STANDING MEDICATIONS  apixaban 5 milliGRAM(s) Oral two times a day  furosemide   Injectable 40 milliGRAM(s) IV Push two times a day  metoprolol tartrate 25 milliGRAM(s) Oral two times a day  simvastatin 20 milliGRAM(s) Oral at bedtime    PRN MEDICATIONS    VITALS:   T(F): 97.7  HR: 66  BP: 146/74  RR: 18  SpO2: 95%    LABS:                        12.4   5.25  )-----------( 159      ( 02 May 2021 05:46 )             38.3     05-03    138  |  94<L>  |  20  ----------------------------<  100<H>  4.2   |  32  |  1.0    Ca    9.2      03 May 2021 06:45  Mg     1.7     05-03    TPro  6.6  /  Alb  3.8  /  TBili  0.6  /  DBili  x   /  AST  20  /  ALT  13  /  AlkPhos  117<H>  05-02    PT/INR - ( 01 May 2021 18:58 )   PT: 12.30 sec;   INR: 1.07 ratio         PTT - ( 01 May 2021 18:58 )  PTT:31.4 sec          CARDIAC MARKERS ( 02 May 2021 05:46 )  x     / <0.01 ng/mL / x     / x     / x      CARDIAC MARKERS ( 01 May 2021 18:58 )  x     / <0.01 ng/mL / x     / x     / x            PHYSICAL EXAM:  GEN: No acute distress  HEENT: AT, NC, NAMRTAA  LUNGS: decreased breath sounds bilaterally  HEART: S1/S2 present. RRR.   ABD: Soft, non-tender, non-distended. Bowel sounds present  EXT: edema, cyanosis, clubbing absent  NEURO: AAOX3

## 2021-05-03 NOTE — PROGRESS NOTE ADULT - ATTENDING COMMENTS
#AFlutter, new onset  eliquis, to send to pharmacy to price  rate controlled  f/u eps for cardioversion  tte with ef 65%, grade ii diastolic dysfunction    #Progress Note Handoff:  Pending (specify):  Consults_________, Tests________, Test Results_______, Other____f/u eps_____  Family discussion:d/w pt and son at bedside re: treatment plan, primary dx  Disposition: Home___/SNF___/Other________/Unknown at this time___x_____

## 2021-05-04 ENCOUNTER — TRANSCRIPTION ENCOUNTER (OUTPATIENT)
Age: 86
End: 2021-05-04

## 2021-05-04 VITALS — DIASTOLIC BLOOD PRESSURE: 76 MMHG | SYSTOLIC BLOOD PRESSURE: 131 MMHG | HEART RATE: 62 BPM

## 2021-05-04 LAB
ANION GAP SERPL CALC-SCNC: 11 MMOL/L — SIGNIFICANT CHANGE UP (ref 7–14)
BUN SERPL-MCNC: 30 MG/DL — HIGH (ref 10–20)
CALCIUM SERPL-MCNC: 9 MG/DL — SIGNIFICANT CHANGE UP (ref 8.5–10.1)
CHLORIDE SERPL-SCNC: 92 MMOL/L — LOW (ref 98–110)
CO2 SERPL-SCNC: 31 MMOL/L — SIGNIFICANT CHANGE UP (ref 17–32)
CREAT SERPL-MCNC: 1.5 MG/DL — SIGNIFICANT CHANGE UP (ref 0.7–1.5)
GLUCOSE SERPL-MCNC: 111 MG/DL — HIGH (ref 70–99)
HCT VFR BLD CALC: 39.2 % — SIGNIFICANT CHANGE UP (ref 37–47)
HGB BLD-MCNC: 12.9 G/DL — SIGNIFICANT CHANGE UP (ref 12–16)
MCHC RBC-ENTMCNC: 29.9 PG — SIGNIFICANT CHANGE UP (ref 27–31)
MCHC RBC-ENTMCNC: 32.9 G/DL — SIGNIFICANT CHANGE UP (ref 32–37)
MCV RBC AUTO: 90.7 FL — SIGNIFICANT CHANGE UP (ref 81–99)
NRBC # BLD: 0 /100 WBCS — SIGNIFICANT CHANGE UP (ref 0–0)
PLATELET # BLD AUTO: 189 K/UL — SIGNIFICANT CHANGE UP (ref 130–400)
POTASSIUM SERPL-MCNC: 4 MMOL/L — SIGNIFICANT CHANGE UP (ref 3.5–5)
POTASSIUM SERPL-SCNC: 4 MMOL/L — SIGNIFICANT CHANGE UP (ref 3.5–5)
RBC # BLD: 4.32 M/UL — SIGNIFICANT CHANGE UP (ref 4.2–5.4)
RBC # FLD: 14.2 % — SIGNIFICANT CHANGE UP (ref 11.5–14.5)
SODIUM SERPL-SCNC: 134 MMOL/L — LOW (ref 135–146)
WBC # BLD: 10.84 K/UL — HIGH (ref 4.8–10.8)
WBC # FLD AUTO: 10.84 K/UL — HIGH (ref 4.8–10.8)

## 2021-05-04 PROCEDURE — 71045 X-RAY EXAM CHEST 1 VIEW: CPT | Mod: 26

## 2021-05-04 PROCEDURE — 99239 HOSP IP/OBS DSCHRG MGMT >30: CPT

## 2021-05-04 PROCEDURE — 99221 1ST HOSP IP/OBS SF/LOW 40: CPT

## 2021-05-04 RX ORDER — METOPROLOL TARTRATE 50 MG
0.5 TABLET ORAL
Qty: 0 | Refills: 0 | DISCHARGE
Start: 2021-05-04

## 2021-05-04 RX ORDER — APIXABAN 2.5 MG/1
1 TABLET, FILM COATED ORAL
Qty: 60 | Refills: 0
Start: 2021-05-04

## 2021-05-04 RX ORDER — SIMVASTATIN 20 MG/1
1 TABLET, FILM COATED ORAL
Qty: 30 | Refills: 0
Start: 2021-05-04

## 2021-05-04 RX ORDER — FUROSEMIDE 40 MG
1 TABLET ORAL
Qty: 0 | Refills: 0 | DISCHARGE
Start: 2021-05-04

## 2021-05-04 RX ORDER — FUROSEMIDE 40 MG
1 TABLET ORAL
Qty: 30 | Refills: 0
Start: 2021-05-04

## 2021-05-04 RX ORDER — FUROSEMIDE 40 MG
1 TABLET ORAL
Qty: 0 | Refills: 0 | DISCHARGE

## 2021-05-04 RX ORDER — METOPROLOL TARTRATE 50 MG
1 TABLET ORAL
Qty: 60 | Refills: 0
Start: 2021-05-04

## 2021-05-04 RX ADMIN — Medication 25 MILLIGRAM(S): at 06:08

## 2021-05-04 RX ADMIN — APIXABAN 5 MILLIGRAM(S): 2.5 TABLET, FILM COATED ORAL at 06:08

## 2021-05-04 RX ADMIN — APIXABAN 5 MILLIGRAM(S): 2.5 TABLET, FILM COATED ORAL at 18:07

## 2021-05-04 RX ADMIN — Medication 25 MILLIGRAM(S): at 18:07

## 2021-05-04 NOTE — DISCHARGE NOTE PROVIDER - NSDCCPCAREPLAN_GEN_ALL_CORE_FT
PRINCIPAL DISCHARGE DIAGNOSIS  Diagnosis: Atrial flutter  Assessment and Plan of Treatment: You came to the hospital because you had an abnormal rhythm of the heart which is knwon is A-flutter. You were evaluated by the electrophysiology team who are specialized in the electricity of the heart. The did an SHAJI which is an endoscopy to look at the heart and they delivered a small electric currnet to reverse the rhythm back to normal. Atrial flutter increase the risk of blood clots, that why you need to be on blood thinners.

## 2021-05-04 NOTE — PROGRESS NOTE ADULT - SUBJECTIVE AND OBJECTIVE BOX
INTERVAL HPI/OVERNIGHT EVENTS:    SUBJECTIVE: Patient seen and examined at bedside.     no cp, sob, abd pain, fever  no sob, orthopnea, pnd, cough  OBJECTIVE:    VITAL SIGNS:  Vital Signs Last 24 Hrs  T(C): 35.8 (04 May 2021 13:18), Max: 36.4 (04 May 2021 06:23)  T(F): 96.5 (04 May 2021 13:18), Max: 97.6 (04 May 2021 06:23)  HR: 61 (04 May 2021 13:18) (61 - 85)  BP: 111/55 (04 May 2021 13:18) (111/55 - 131/86)  BP(mean): --  RR: 18 (04 May 2021 13:18) (18 - 18)  SpO2: 95% (04 May 2021 07:49) (95% - 95%)      PHYSICAL EXAM:    General: NAD  HEENT: NC/AT; PERRL, clear conjunctiva  Neck: supple  Respiratory: CTA b/l  Cardiovascular: +S1/S2; RRR  Abdomen: soft, NT/ND; +BS x4  Extremities: WWP, 2+ peripheral pulses b/l; 1+ pit edema bl  Skin: normal color and turgor; no rash  Neurological:    MEDICATIONS:  MEDICATIONS  (STANDING):  apixaban 5 milliGRAM(s) Oral two times a day  metoprolol tartrate 25 milliGRAM(s) Oral two times a day  simvastatin 20 milliGRAM(s) Oral at bedtime    MEDICATIONS  (PRN):      ALLERGIES:  Allergies    No Known Allergies    Intolerances        LABS:                        12.9   10.84 )-----------( 189      ( 04 May 2021 04:30 )             39.2     Hemoglobin: 12.9 g/dL (05-04 @ 04:30)  Hemoglobin: 13.2 g/dL (05-03 @ 06:45)  Hemoglobin: 12.4 g/dL (05-02 @ 05:46)  Hemoglobin: 12.4 g/dL (05-01 @ 18:58)    CBC Full  -  ( 04 May 2021 04:30 )  WBC Count : 10.84 K/uL  RBC Count : 4.32 M/uL  Hemoglobin : 12.9 g/dL  Hematocrit : 39.2 %  Platelet Count - Automated : 189 K/uL  Mean Cell Volume : 90.7 fL  Mean Cell Hemoglobin : 29.9 pg  Mean Cell Hemoglobin Concentration : 32.9 g/dL  Auto Neutrophil # : x  Auto Lymphocyte # : x  Auto Monocyte # : x  Auto Eosinophil # : x  Auto Basophil # : x  Auto Neutrophil % : x  Auto Lymphocyte % : x  Auto Monocyte % : x  Auto Eosinophil % : x  Auto Basophil % : x    05-04    134<L>  |  92<L>  |  30<H>  ----------------------------<  111<H>  4.0   |  31  |  1.5    Ca    9.0      04 May 2021 04:30  Mg     1.7     05-03      Creatinine Trend: 1.5<--, 1.0<--, 0.8<--, 0.8<--        hs Troponin:              CSF:                      EKG:   MICROBIOLOGY:    IMAGING:      Labs, imaging, EKG personally reviewed    RADIOLOGY & ADDITIONAL TESTS: Reviewed.

## 2021-05-04 NOTE — DISCHARGE NOTE PROVIDER - HOSPITAL COURSE
86 F PMH HLD presenting for dyspnea and cough X 1 week duration. Patient son (who is a physician) noted wheezing and an irregular heart beat. Patient has no previous cardiac history, never seen a cardiologist. In ED, patient found to have new onset aflutter. Patient was rate controlled and hemodynamically stable. BNP 2600. Patient being admitted for new onset a-flutter.    IMPRESSION   New Onset A-flutter  BALLARD, fluid overloaded on exam  DLD    # New onset AFlutter (CHADVasc ~ 3 if we rule out  HF)  - patient grossly overloaded on exam, Dyspneic on exertion (peo-BNP 2500)  - will start her on IV lasix 40 mg BiD (4 doses)  - TTE showed  1. LV Ejection Fraction by Pinto's Method with a biplane EF of 65 %.   2. Normal global left ventricular systolic function.   3. Moderate left ventricular hypertrophy.   4. Spectral Doppler shows pseudonormal pattern of left ventricular myocardial filling (Grade II diastolic dysfunction).   5. Severely enlarged left atrium.   6. Severely enlarged right atrium.   7. Sclerotic aortic valve with normal opening.   8. Mild aortic regurgitation.   9. Thickening and calcification of the anterior and posterior mitral leaflets.  10. Mild-to-moderate mitral regurgitation.  11. Moderate tricuspid regurgitation.  12. Estimated pulmonary artery systolic pressure is 36.7 mmHg assuming a right atrial pressure of 15 mmHg, which is consistent with borderline pulmonary hypertension.  - EPS Following, offered D[facility]V, follow up their recs   - will start on Lopressor 25 mg BiD ( for rate control)  -check TSH  -check A1c   -intake and outtake   -tele monitoring  -CHADVASC 3, continue with AC    #HLD   -c/w simvastatin 20mg   _____________________________________  DIET: DASH  DVT ppx-eliquis  AAT    FULL CODE 86 F PMH HLD presenting for dyspnea and cough X 1 week duration. Patient son (who is a physician) noted wheezing and an irregular heart beat. Patient has no previous cardiac history, never seen a cardiologist. In ED, patient found to have new onset aflutter. Patient was rate controlled and hemodynamically stable. BNP 2600. Patient being admitted for new onset a-flutter.    IMPRESSION   New Onset A-flutter  BALLARD, fluid overloaded on exam  DLD    # New onset AFlutter (CHADVasc ~ 3 if we rule out  HF)  - patient grossly overloaded on exam, Dyspneic on exertion (peo-BNP 2500)  - will start her on IV lasix 40 mg BiD (4 doses)  - TTE showed  1. LV Ejection Fraction by Pinto's Method with a biplane EF of 65 %.   2. Normal global left ventricular systolic function.   3. Moderate left ventricular hypertrophy.   4. Spectral Doppler shows pseudonormal pattern of left ventricular myocardial filling (Grade II diastolic dysfunction).   5. Severely enlarged left atrium.   6. Severely enlarged right atrium.   7. Sclerotic aortic valve with normal opening.   8. Mild aortic regurgitation.   9. Thickening and calcification of the anterior and posterior mitral leaflets.  10. Mild-to-moderate mitral regurgitation.  11. Moderate tricuspid regurgitation.  12. Estimated pulmonary artery systolic pressure is 36.7 mmHg assuming a right atrial pressure of 15 mmHg, which is consistent with borderline pulmonary hypertension.  - EPS Following, offered DCCV, follow up their recs   - will start on Lopressor 25 mg BiD ( for rate control)  -check TSH  -check A1c   -intake and outtake   -tele monitoring  -CHADVASC 3, continue with AC    #HLD   -c/w simvastatin 20mg   _____________________________________  DIET: DASH  DVT ppx-eliquis  AAT    FULL CODE     41 minutes spent on discharge planning.

## 2021-05-04 NOTE — CONSULT NOTE ADULT - SUBJECTIVE AND OBJECTIVE BOX
Date of Admission: 5/1/21    HISTORY OF PRESENT ILLNESS:   86 F PMH HLD presenting for dyspnea on exertion and cough X 1 week duration. Patient son (who is a physician) noted wheezing and an irregular heart beat. Patient has no previous cardiac history, never seen a cardiologist. She was started on Lasix, takes once a week for leg swelling. Was given to her by PCP.  Patient  recently passed away after lengthy dementia history. Patient was primary caretaker of . Initially her symptoms were attributed to possibly having a cold, but they did not resolve.      PAST MEDICAL & SURGICAL HISTORY:  HLD  HFpEF       FAMILY HISTORY:  [ ] no pertinent family history of premature cardiovascular disease in first degree relatives.  Mother:   Father:   Siblings:     SOCIAL HISTORY:    [ ] Non-smoker  [ ] Smoker  [ ] Alcohol    Allergies    No Known Allergies    Intolerances    	    REVIEW OF SYSTEMS:  CONSTITUTIONAL: No fever, weight loss, or fatigue  CARDIOLOGY: PAtient denies chest pain, shortness of breath or syncopal episodes.   RESPIRATORY: denies shortness of breath, wheezeing.   NEUROLOGICAL: NO weakness, no focal deficits to report.  ENDOCRINOLOGICAL: no recent change in diabetic medications.   GI: no BRBPR, no N,V,diarrhea.    PSYCHIATRY: normal mood and affect  HEENT: no nasal discharge, no ecchymosis  SKIN: no ecchymosis, no breakdown  MUSCULOSKELETAL: Full range of motion x4.     PHYSICAL EXAM:    General Appearance: well appearing, normal for age and gender. 	  Neck: normal JVP, no bruit.   Eyes: No xanthomalasia, Extra Ocular muscles intact.   Cardiovascular: regular rate and rhythm S1 S2, No JVD, No murmurs, No edema  Respiratory: Lungs clear to auscultation	  Psychiatry: Alert and oriented x 3, Mood & affect appropriate  Gastrointestinal:  Soft, Non-tender  Skin/Integumen: No rashes, No ecchymoses, No cyanosis	  Neurologic: Non-focal  Musculoskeletal/ extremities: Normal range of motion, No clubbing, cyanosis or edema  Vascular: Peripheral pulses palpable 2+ bilaterally      PREVIOUS DIAGNOSTIC TESTING:      TTE 5/2/21  Summary:   1. LV Ejection Fraction by Pinto's Method with a biplane EF of 65 %.   2. Normal global left ventricular systolic function.   3. Moderate left ventricular hypertrophy.   4. Spectral Doppler shows pseudonormal pattern of left ventricular myocardial filling (Grade II diastolic dysfunction).   5. Severely enlarged left atrium.   6. Severely enlarged right atrium.   7. Sclerotic aortic valve with normal opening.   8. Mild aortic regurgitation.   9. Thickening and calcification of the anterior and posterior mitral leaflets.  10. Mild-to-moderate mitral regurgitation.  11. Moderate tricuspid regurgitation.  12. Estimated pulmonary artery systolic pressure is 36.7 mmHg assuming a right atrial pressure of 15 mmHg, which is consistent with borderline pulmonary hypertension.      Home Medications:    MEDICATIONS  (STANDING):  apixaban 5 milliGRAM(s) Oral two times a day  metoprolol tartrate 25 milliGRAM(s) Oral two times a day  simvastatin 20 milliGRAM(s) Oral at bedtime    MEDICATIONS  (PRN):         Date of Admission: 21    HISTORY OF PRESENT ILLNESS:   86 F PMH HLD presenting for dyspnea on exertion and cough X 1 week duration. Patient son (who is a physician) noted wheezing and an irregular heart beat. Patient has no previous cardiac history, never seen a cardiologist. She was started on Lasix, takes once a week for leg swelling. Was given to her by PCP.  Patient  recently passed away after lengthy dementia history. Patient was primary caretaker of . Initially her symptoms were attributed to possibly having a cold, but they did not resolve.      PAST MEDICAL & SURGICAL HISTORY:  HLD  HFpEF       FAMILY HISTORY:    Mother:  at 99 old age  Father:  at 92 MRSA    SOCIAL HISTORY:      Patient denies smoking, alcohol or drug use    Allergies    No Known Allergies    Intolerances    REVIEW OF SYSTEMS:    CONSTITUTIONAL: No fever, weight loss, or fatigue  CARDIOLOGY: denies chest pain, shortness of breath or syncopal episodes.   RESPIRATORY: denies shortness of breath  NEUROLOGICAL: NO weakness, no focal deficits to report.  ENDOCRINOLOGICAL: no recent change in diabetic medications.   GI: no BRBPR, no N,V, diarrhea.    PSYCHIATRY: normal mood and affect  HEENT: no nasal discharge, no ecchymosis  SKIN: no ecchymosis, no breakdown  MUSCULOSKELETAL: Full range of motion x4.     PHYSICAL EXAM:    General Appearance: well appearing, normal for age and gender. 	  Neck: normal JVP, no bruit.   Eyes:  Extra Ocular muscles intact.   Cardiovascular: regular rate and rhythm S1 S2, No JVD, No murmurs, No edema  Respiratory: Lungs clear to auscultation	  Psychiatry: Alert and oriented x 3, Mood & affect appropriate  Gastrointestinal:  Soft, Non-tender  Skin/Integumen: No rashes, No ecchymoses, No cyanosis	  Neurologic: Non-focal  Musculoskeletal/ extremities: Normal range of motion, No clubbing, cyanosis or edema  Vascular: Peripheral pulses palpable 2+ bilaterally      PREVIOUS DIAGNOSTIC TESTING:      TTE 21  Summary:   1. LV Ejection Fraction by Pinto's Method with a biplane EF of 65 %.   2. Normal global left ventricular systolic function.   3. Moderate left ventricular hypertrophy.   4. Spectral Doppler shows pseudonormal pattern of left ventricular myocardial filling (Grade II diastolic dysfunction).   5. Severely enlarged left atrium.   6. Severely enlarged right atrium.   7. Sclerotic aortic valve with normal opening.   8. Mild aortic regurgitation.   9. Thickening and calcification of the anterior and posterior mitral leaflets.  10. Mild-to-moderate mitral regurgitation.  11. Moderate tricuspid regurgitation.  12. Estimated pulmonary artery systolic pressure is 36.7 mmHg assuming a right atrial pressure of 15 mmHg, which is consistent with borderline pulmonary hypertension.      Home Medications:    MEDICATIONS  (STANDING):  apixaban 5 milliGRAM(s) Oral two times a day  metoprolol tartrate 25 milliGRAM(s) Oral two times a day  simvastatin 20 milliGRAM(s) Oral at bedtime    MEDICATIONS  (PRN):

## 2021-05-04 NOTE — DISCHARGE NOTE PROVIDER - CARE PROVIDERS DIRECT ADDRESSES
[email or web address]

## 2021-05-04 NOTE — CONSULT NOTE ADULT - ASSESSMENT
Patient remains fluid overloaded on exam  Get BMP daily   Maintain potassium >4.0, Mg >2.1  Strict intake and output  Daily weight   plan discussed with patient and family, low sodium diet reccomended  Will continue to follow    Acute on chronic diastolic heart failure/ atrial flutter/fluid overload     Patient remains fluid overloaded on exam  Furosemide 20 mg daily and extra 20 mg as needed for weight gain   Get BMP daily   Maintain potassium >4.0, Mg >2.1  Strict intake and output  Daily weight   Plan discussed with patient and family, low sodium diet recommended   DC planning - will follow up as outpatient   Discussed with family at bedside and primary team

## 2021-05-04 NOTE — PROGRESS NOTE ADULT - ASSESSMENT
86 F PMH HLD presenting for dyspnea and cough X 1 week duration. Patient son (who is a physician) noted wheezing and an irregular heart beat. Patient has no previous cardiac history, never seen a cardiologist. In ED, patient found to have new onset aflutter. Patient was rate controlled and hemodynamically stable. BNP 2600. Patient being admitted for new onset a-flutter.    IMPRESSION   New Onset A-flutter  BALLARD, fluid overloaded on exam  DLD    # New onset AFlutter (CHADVasc ~ 3 if we rule out  HF)  - patient grossly overloaded on exam, Dyspneic on exertion (peo-BNP 2500)  - will start her on IV lasix 40 mg BiD (4 doses)  - TTE showed  1. LV Ejection Fraction by Pinto's Method with a biplane EF of 65 %.   2. Normal global left ventricular systolic function.   3. Moderate left ventricular hypertrophy.   4. Spectral Doppler shows pseudonormal pattern of left ventricular myocardial filling (Grade II diastolic dysfunction).   5. Severely enlarged left atrium.   6. Severely enlarged right atrium.   7. Sclerotic aortic valve with normal opening.   8. Mild aortic regurgitation.   9. Thickening and calcification of the anterior and posterior mitral leaflets.  10. Mild-to-moderate mitral regurgitation.  11. Moderate tricuspid regurgitation.  12. Estimated pulmonary artery systolic pressure is 36.7 mmHg assuming a right atrial pressure of 15 mmHg, which is consistent with borderline pulmonary hypertension.  - EPS Following, offered DCCV, follow up their recs   - will start on Lopressor 25 mg BiD ( for rate control)  -check TSH  -check A1c   -intake and outtake   -tele monitoring  -CHADVASC 3, continue with AC    #HLD   -c/w simvastatin 20mg   _____________________________________  DIET: DASH  DVT ppx-eliquis  AAT  dispo-from home, telemetry monitoring     FULL CODE   
86F PMHx HLD here with shortness of breath found to have new onset AFlutter.    #AFlutter, new onset  eliquis, to send to pharmacy to price  s/p caridoversion, in nsr  tte with ef 65%, grade ii diastolic dysfunction  repeat cxr  to d/w heart failure re: home lasix dose  discharge planning  #HFpEF, chronic  to d/w heart failure as above  tte as above  #HLD  zocor 20  #DVT ppx  eliquis    #Progress Note Handoff:  Pending (specify):  Consults_________, Tests________, Test Results_______, Other____f/u heart failure_____  Family discussion:d/w pt and son at bedside re: treatment plan, primary dx  Disposition: Home___/SNF___/Other________/Unknown at this time___x_____ .
86 F PMH HLD presenting for dyspnea and cough X 1 week duration. Patient son (who is a physician) noted wheezing and an irregular heart beat. Patient has no previous cardiac history, never seen a cardiologist. In ED, patient found to have new onset aflutter. Patient was rate controlled and hemodynamically stable. BNP 2600. Patient being admitted for new onset a-flutter.    IMPRESSION   New Onset A-flutter  BALLARD, fluid overloaded on exam  DLD    # New onset AFlutter (CHADVasc ~ 3 if we rule out  HF)  - patient grossly overloaded on exam, Dyspneic on exertion (peo-BNP 2500)  - will start her on IV lasix 40 mg BiD (4 doses)  - 2D echo pending  - EPS consult pending  - will start on Lopressor 25 mg BiD ( for rate control)  -check TSH  -check A1c   -intake and outtake   -tele monitoring  - spoke to Dr. Martinez ( he is a cardiologist and Family's friend, he has already spoken to Dr. Whiteside to see her tomorrow). discussed need for AC, with CHADVASC of 3 he agrees with starting patient on ELiquis.    #HLD   -c/w simvastatin 20mg   _____________________________________  DIET: DASH  DVT ppx-eliquis  AAT  dispo-from home, telemetry monitoring     FULL CODE

## 2021-05-04 NOTE — DISCHARGE NOTE NURSING/CASE MANAGEMENT/SOCIAL WORK - PATIENT PORTAL LINK FT
You can access the FollowMyHealth Patient Portal offered by [facility] by registering at the following website: [email or web address]. By joining [facility]’s FollowMyHealth portal, you will also be able to view your health information using other applications (apps) compatible with our system.

## 2021-05-04 NOTE — DISCHARGE NOTE NURSING/CASE MANAGEMENT/SOCIAL WORK - NSDCPEELIQUISCOMP_GEN_ALL_CORE
Prescription for clonazepam faxed to [facility],       
clonazePAM (KLONOPIN) 0.5 MG ODT   Last Written Prescription Date:  3-  Last Fill Quantity: 30 tablet,   # refills: 0  Last Office Visit: 1-  Future Office visit:    Next 5 appointments (look out 90 days)    Jun 17, 2019 10:40 AM CDT  Return Visit with Eloisa Rossi MD  [facility] ([facility]) [facility]  [address]  [facility] [address]  [phone]           Routing refill request to provider for review/approval because:  Drug not on the FM, P or [facility] refill protocol or controlled substance    
Apixaban/Eliquis is used to treat and prevent blood clots. If you are not able to swallow the tablets whole, they may be crushed and mixed in water, apple juice, or applesauce and promptly taken within four hours. Never skip a dose of Apixaban/Eliquis. If you forget to take your Apixaban/Eliquis, take a dose as soon as you remember. If it is almost time for your next Apixaban/Eliquis dose, wait until then and take a regular dose. DO NOT take an extra pill to ‘catch up’.  NEVER TAKE A DOUBLE DOSE. Notify your doctor that you missed a dose. Take Apixaban/Eliquis at the same time each morning and evening. Apixaban/Eliquis may be taken with other medication or food.

## 2021-05-04 NOTE — DISCHARGE NOTE PROVIDER - CARE PROVIDER_API CALL
Larry Kenyon; ANUSHA)  Electrophysiology Center  [address] [place] [address]  Phone: [phone]  Fax: [phone]  Follow Up Time: 1 week    Dalia Arredondo (DO)  Family Medicine  [address] [place] [address]  Phone: [phone]  Fax: [phone]  Follow Up Time: 1 week

## 2021-05-04 NOTE — DISCHARGE NOTE PROVIDER - NSDCMRMEDTOKEN_GEN_ALL_CORE_FT
apixaban 5 mg oral tablet: two tab(s) orally 2 times a day for 5 more days and then 1 tablet orally twice daily.   Lasix 20 mg oral tablet: 1 tab(s) orally once a day   metoprolol tartrate 25 mg oral tablet: 1 tab(s) orally 2 times a day  simvastatin 20 mg oral tablet: 1 tab(s) orally once a day (at bedtime)

## 2021-05-04 NOTE — DISCHARGE NOTE PROVIDER - PROVIDER TOKENS
PROVIDER:[TOKEN:[[id number]],FOLLOWUP:[1 week]],PROVIDER:[TOKEN:[[id number]],FOLLOWUP:[1 week]]

## 2021-05-10 DIAGNOSIS — I48.92 UNSPECIFIED ATRIAL FLUTTER: ICD-10-CM

## 2021-05-10 DIAGNOSIS — I70.0 ATHEROSCLEROSIS OF AORTA: ICD-10-CM

## 2021-05-10 DIAGNOSIS — E78.5 HYPERLIPIDEMIA, UNSPECIFIED: ICD-10-CM

## 2021-05-10 DIAGNOSIS — I50.33 ACUTE ON CHRONIC DIASTOLIC (CONGESTIVE) HEART FAILURE: ICD-10-CM

## 2021-05-10 DIAGNOSIS — R05 COUGH: ICD-10-CM

## 2021-05-10 DIAGNOSIS — I08.3 COMBINED RHEUMATIC DISORDERS OF MITRAL, AORTIC AND TRICUSPID VALVES: ICD-10-CM

## 2021-06-01 ENCOUNTER — APPOINTMENT (OUTPATIENT)
Dept: CARDIOLOGY | Facility: CLINIC | Age: 86
End: 2021-06-01
Payer: MEDICARE

## 2021-06-01 VITALS
SYSTOLIC BLOOD PRESSURE: 122 MMHG | WEIGHT: 185 LBS | TEMPERATURE: 97.9 F | HEART RATE: 51 BPM | DIASTOLIC BLOOD PRESSURE: 82 MMHG | BODY MASS INDEX: 38.83 KG/M2 | HEIGHT: 58 IN | OXYGEN SATURATION: 97 %

## 2021-06-01 DIAGNOSIS — Z00.00 ENCOUNTER FOR GENERAL ADULT MEDICAL EXAMINATION W/OUT ABNORMAL FINDINGS: ICD-10-CM

## 2021-06-01 PROCEDURE — 99214 OFFICE O/P EST MOD 30 MIN: CPT

## 2021-06-01 PROCEDURE — 93000 ELECTROCARDIOGRAM COMPLETE: CPT

## 2021-06-10 ENCOUNTER — APPOINTMENT (OUTPATIENT)
Dept: CARDIOLOGY | Facility: CLINIC | Age: 86
End: 2021-06-10
Payer: MEDICARE

## 2021-06-10 VITALS
BODY MASS INDEX: 37.57 KG/M2 | WEIGHT: 179 LBS | DIASTOLIC BLOOD PRESSURE: 58 MMHG | HEIGHT: 58 IN | SYSTOLIC BLOOD PRESSURE: 115 MMHG | HEART RATE: 63 BPM | OXYGEN SATURATION: 98 %

## 2021-06-10 PROCEDURE — 93000 ELECTROCARDIOGRAM COMPLETE: CPT

## 2021-06-10 PROCEDURE — 99214 OFFICE O/P EST MOD 30 MIN: CPT

## 2021-06-10 RX ORDER — SIMVASTATIN 20 MG/1
20 TABLET, FILM COATED ORAL
Qty: 30 | Refills: 0 | Status: ACTIVE | COMMUNITY

## 2021-06-11 NOTE — HISTORY OF PRESENT ILLNESS
[FreeTextEntry1] : I had a pleasure of seeing Ms. HERNANDEZ for follow-up consultation for atrial flutter. She is accompanied by her son,who is a physician. \par \par Ms. HERNANDEZ is a 86 year-year old female with history of DL, typical atrial flutter was recently admitted with HF- found to have typical atrial flutter- had DCCV- is here for f-up.\par \par Denies chest pain, shortness of breath, palpitation, dizziness or LOC except noted above.\par \par EKG: SR @57/min, QRSd 96 ms\par TTE (05/21): EF 65%, Mod LVH, Severe LAE/CHAGO, mild to mod MR\par Cardio: Dr. Whiteside

## 2021-06-11 NOTE — ASSESSMENT
[FreeTextEntry1] : ## Typical Atrial Flutter\par ## HFpEF\par \par - On Eliquis. Compliant. No bleeding issues\par - Discussed management options for AFL-- meds vs ablation. Ablation is the first line therapy for AFL, considering HF with AFL. However, they prefer conservative management with current meds.\par - Continue metoprolol\par - Return as needed\par

## 2021-07-01 ENCOUNTER — OUTPATIENT (OUTPATIENT)
Dept: OUTPATIENT SERVICES | Facility: HOSPITAL | Age: 86
LOS: 1 days | Discharge: HOME | End: 2021-07-01
Payer: MEDICARE

## 2021-07-01 VITALS
TEMPERATURE: 97 F | WEIGHT: 173.94 LBS | HEIGHT: 59 IN | DIASTOLIC BLOOD PRESSURE: 63 MMHG | HEART RATE: 67 BPM | RESPIRATION RATE: 16 BRPM | OXYGEN SATURATION: 100 % | SYSTOLIC BLOOD PRESSURE: 131 MMHG

## 2021-07-01 DIAGNOSIS — C06.9 MALIGNANT NEOPLASM OF MOUTH, UNSPECIFIED: Chronic | ICD-10-CM

## 2021-07-01 DIAGNOSIS — Z98.890 OTHER SPECIFIED POSTPROCEDURAL STATES: Chronic | ICD-10-CM

## 2021-07-01 DIAGNOSIS — Z96.651 PRESENCE OF RIGHT ARTIFICIAL KNEE JOINT: Chronic | ICD-10-CM

## 2021-07-01 DIAGNOSIS — Z01.818 ENCOUNTER FOR OTHER PREPROCEDURAL EXAMINATION: ICD-10-CM

## 2021-07-01 DIAGNOSIS — Z96.652 PRESENCE OF LEFT ARTIFICIAL KNEE JOINT: Chronic | ICD-10-CM

## 2021-07-01 DIAGNOSIS — C67.9 MALIGNANT NEOPLASM OF BLADDER, UNSPECIFIED: ICD-10-CM

## 2021-07-01 LAB
ALBUMIN SERPL ELPH-MCNC: 4.3 G/DL — SIGNIFICANT CHANGE UP (ref 3.5–5.2)
ALP SERPL-CCNC: 121 U/L — HIGH (ref 30–115)
ALT FLD-CCNC: 11 U/L — SIGNIFICANT CHANGE UP (ref 0–41)
ANION GAP SERPL CALC-SCNC: 9 MMOL/L — SIGNIFICANT CHANGE UP (ref 7–14)
APPEARANCE UR: CLEAR — SIGNIFICANT CHANGE UP
APTT BLD: 36.3 SEC — SIGNIFICANT CHANGE UP (ref 27–39.2)
AST SERPL-CCNC: 19 U/L — SIGNIFICANT CHANGE UP (ref 0–41)
BASOPHILS # BLD AUTO: 0.06 K/UL — SIGNIFICANT CHANGE UP (ref 0–0.2)
BASOPHILS NFR BLD AUTO: 1 % — SIGNIFICANT CHANGE UP (ref 0–1)
BILIRUB SERPL-MCNC: 0.6 MG/DL — SIGNIFICANT CHANGE UP (ref 0.2–1.2)
BILIRUB UR-MCNC: NEGATIVE — SIGNIFICANT CHANGE UP
BUN SERPL-MCNC: 33 MG/DL — HIGH (ref 10–20)
CALCIUM SERPL-MCNC: 9.9 MG/DL — SIGNIFICANT CHANGE UP (ref 8.5–10.1)
CHLORIDE SERPL-SCNC: 98 MMOL/L — SIGNIFICANT CHANGE UP (ref 98–110)
CO2 SERPL-SCNC: 28 MMOL/L — SIGNIFICANT CHANGE UP (ref 17–32)
COLOR SPEC: YELLOW — SIGNIFICANT CHANGE UP
CREAT SERPL-MCNC: 1 MG/DL — SIGNIFICANT CHANGE UP (ref 0.7–1.5)
DIFF PNL FLD: NEGATIVE — SIGNIFICANT CHANGE UP
EOSINOPHIL # BLD AUTO: 0.14 K/UL — SIGNIFICANT CHANGE UP (ref 0–0.7)
EOSINOPHIL NFR BLD AUTO: 2.4 % — SIGNIFICANT CHANGE UP (ref 0–8)
GLUCOSE SERPL-MCNC: 114 MG/DL — HIGH (ref 70–99)
GLUCOSE UR QL: NEGATIVE — SIGNIFICANT CHANGE UP
HCT VFR BLD CALC: 41.1 % — SIGNIFICANT CHANGE UP (ref 37–47)
HGB BLD-MCNC: 13 G/DL — SIGNIFICANT CHANGE UP (ref 12–16)
IMM GRANULOCYTES NFR BLD AUTO: 0.2 % — SIGNIFICANT CHANGE UP (ref 0.1–0.3)
INR BLD: 1.51 RATIO — HIGH (ref 0.65–1.3)
KETONES UR-MCNC: NEGATIVE — SIGNIFICANT CHANGE UP
LEUKOCYTE ESTERASE UR-ACNC: NEGATIVE — SIGNIFICANT CHANGE UP
LYMPHOCYTES # BLD AUTO: 1.64 K/UL — SIGNIFICANT CHANGE UP (ref 1.2–3.4)
LYMPHOCYTES # BLD AUTO: 28.1 % — SIGNIFICANT CHANGE UP (ref 20.5–51.1)
MCHC RBC-ENTMCNC: 29.3 PG — SIGNIFICANT CHANGE UP (ref 27–31)
MCHC RBC-ENTMCNC: 31.6 G/DL — LOW (ref 32–37)
MCV RBC AUTO: 92.8 FL — SIGNIFICANT CHANGE UP (ref 81–99)
MONOCYTES # BLD AUTO: 0.64 K/UL — HIGH (ref 0.1–0.6)
MONOCYTES NFR BLD AUTO: 11 % — HIGH (ref 1.7–9.3)
NEUTROPHILS # BLD AUTO: 3.34 K/UL — SIGNIFICANT CHANGE UP (ref 1.4–6.5)
NEUTROPHILS NFR BLD AUTO: 57.3 % — SIGNIFICANT CHANGE UP (ref 42.2–75.2)
NITRITE UR-MCNC: NEGATIVE — SIGNIFICANT CHANGE UP
NRBC # BLD: 0 /100 WBCS — SIGNIFICANT CHANGE UP (ref 0–0)
PH UR: 5.5 — SIGNIFICANT CHANGE UP (ref 5–8)
PLATELET # BLD AUTO: 168 K/UL — SIGNIFICANT CHANGE UP (ref 130–400)
POTASSIUM SERPL-MCNC: 4.7 MMOL/L — SIGNIFICANT CHANGE UP (ref 3.5–5)
POTASSIUM SERPL-SCNC: 4.7 MMOL/L — SIGNIFICANT CHANGE UP (ref 3.5–5)
PROT SERPL-MCNC: 7.3 G/DL — SIGNIFICANT CHANGE UP (ref 6–8)
PROT UR-MCNC: SIGNIFICANT CHANGE UP
PROTHROM AB SERPL-ACNC: 17.4 SEC — HIGH (ref 9.95–12.87)
RBC # BLD: 4.43 M/UL — SIGNIFICANT CHANGE UP (ref 4.2–5.4)
RBC # FLD: 15.4 % — HIGH (ref 11.5–14.5)
SODIUM SERPL-SCNC: 135 MMOL/L — SIGNIFICANT CHANGE UP (ref 135–146)
SP GR SPEC: 1.02 — SIGNIFICANT CHANGE UP (ref 1.01–1.03)
UROBILINOGEN FLD QL: SIGNIFICANT CHANGE UP
WBC # BLD: 5.83 K/UL — SIGNIFICANT CHANGE UP (ref 4.8–10.8)
WBC # FLD AUTO: 5.83 K/UL — SIGNIFICANT CHANGE UP (ref 4.8–10.8)

## 2021-07-01 PROCEDURE — 93010 ELECTROCARDIOGRAM REPORT: CPT

## 2021-07-01 NOTE — H&P PST ADULT - NSICDXPASTMEDICALHX_GEN_ALL_CORE_FT
PAST MEDICAL HISTORY:  Atrial fibrillation     Bladder polyp     High cholesterol     Kidney stone     Shingles affecting right eye

## 2021-07-01 NOTE — H&P PST ADULT - NSICDXPASTSURGICALHX_GEN_ALL_CORE_FT
PAST SURGICAL HISTORY:  Cancer of mouth     H/O cystoscopy     H/O total knee replacement, left     H/O total knee replacement, right     History of cardioversion

## 2021-07-01 NOTE — H&P PST ADULT - HISTORY OF PRESENT ILLNESS
85 yo female presents for PAST in preparation for cystoscopy scheduled on 7/8  Pt complains of Denies any chest pain, difficulty breathing, SOB, palpitations, dysuria, URI, or any other infections in the last 2 weeks/1 month. Denies any recent travel, contact, or exposure to any persons with known or suspected COVID-19. Pt also denies COVID testing within the last 2 weeks. Pt advised to self quarantine until day of procedure. Exercise tolerance is 0.5-1 block without dyspnea. ROSA reviewed with patient.  Anesthesia Alert  NO--Difficult Airway  NO--History of neck surgery or radiation  NO--Limited ROM of neck  NO--History of Malignant hyperthermia  NO--Personal or family history of Pseudocholinesterase deficiency.  YES--Prior Anesthesia Complication---blood pressure drops   NO--Latex Allergy  NO--Loose teeth  NO--History of Rheumatoid Arthritis  NO--ROSA  NO--Bleeding risk  NO--Other   written and verbal instructions with teach back on chlorhexidine shampoo provided,  pt verbalized understanding with returned demonstration  Patient verbalized understanding of instructions and was given the opportunity to ask questions and have them answered.   85 yo female presents for PAST in preparation for cystoscopy scheduled on 7/8  Pt complains of history of bladder polyps that were censures and removed about 10 years ago. As per pt she is scheduled for cystoscopy every three months.  Also, pt reports mouth cancer Denies any chest pain, difficulty breathing, SOB, palpitations, dysuria, URI, or any other infections in the last 2 weeks/1 month. Denies any recent travel, contact, or exposure to any persons with known or suspected COVID-19. Pt also denies COVID testing within the last 2 weeks. Pt advised to self quarantine until day of procedure. Exercise tolerance is 0.5-1 block without dyspnea. ROSA reviewed with patient.  Anesthesia Alert  NO--Difficult Airway  NO--History of neck surgery or radiation  NO--Limited ROM of neck  NO--History of Malignant hyperthermia  NO--Personal or family history of Pseudocholinesterase deficiency.  YES--Prior Anesthesia Complication---blood pressure drops   NO--Latex Allergy  NO--Loose teeth  NO--History of Rheumatoid Arthritis  NO--ROSA  NO--Bleeding risk  NO--Other   written and verbal instructions with teach back on chlorhexidine shampoo provided,  pt verbalized understanding with returned demonstration  Patient verbalized understanding of instructions and was given the opportunity to ask questions and have them answered.

## 2021-07-01 NOTE — H&P PST ADULT - REASON FOR ADMISSION
cystoscopy scheduled on 7/8 under general anesthesia [facility]

## 2021-07-01 NOTE — H&P PST ADULT - NSANTHAGERD_ENT_A_CORE
Closure 4 Information: This tab is for additional flaps and grafts above and beyond our usual structured repairs.  Please note if you enter information here it will not currently bill and you will need to add the billing information manually. Yes

## 2021-07-02 ENCOUNTER — NON-APPOINTMENT (OUTPATIENT)
Age: 86
End: 2021-07-02

## 2021-07-02 PROCEDURE — 71046 X-RAY EXAM CHEST 2 VIEWS: CPT | Mod: 26

## 2021-07-03 LAB
CULTURE RESULTS: SIGNIFICANT CHANGE UP
SPECIMEN SOURCE: SIGNIFICANT CHANGE UP

## 2021-07-25 ENCOUNTER — OUTPATIENT (OUTPATIENT)
Dept: OUTPATIENT SERVICES | Facility: HOSPITAL | Age: 86
LOS: 1 days | Discharge: HOME | End: 2021-07-25

## 2021-07-25 DIAGNOSIS — Z96.652 PRESENCE OF LEFT ARTIFICIAL KNEE JOINT: Chronic | ICD-10-CM

## 2021-07-25 DIAGNOSIS — Z98.890 OTHER SPECIFIED POSTPROCEDURAL STATES: Chronic | ICD-10-CM

## 2021-07-25 DIAGNOSIS — Z11.59 ENCOUNTER FOR SCREENING FOR OTHER VIRAL DISEASES: ICD-10-CM

## 2021-07-25 DIAGNOSIS — C06.9 MALIGNANT NEOPLASM OF MOUTH, UNSPECIFIED: Chronic | ICD-10-CM

## 2021-07-25 DIAGNOSIS — Z96.651 PRESENCE OF RIGHT ARTIFICIAL KNEE JOINT: Chronic | ICD-10-CM

## 2021-07-25 PROBLEM — E78.00 PURE HYPERCHOLESTEROLEMIA, UNSPECIFIED: Chronic | Status: ACTIVE | Noted: 2021-07-01

## 2021-07-25 PROBLEM — N20.0 CALCULUS OF KIDNEY: Chronic | Status: ACTIVE | Noted: 2021-07-01

## 2021-07-25 PROBLEM — B02.9 ZOSTER WITHOUT COMPLICATIONS: Chronic | Status: ACTIVE | Noted: 2021-07-01

## 2021-07-25 PROBLEM — D41.4 NEOPLASM OF UNCERTAIN BEHAVIOR OF BLADDER: Chronic | Status: ACTIVE | Noted: 2021-07-01

## 2021-07-27 NOTE — HISTORY OF PRESENT ILLNESS
[FreeTextEntry1] : 86 F PMH HLD and HFpEF. Here for a follow up after hospital admission for a-flutter and CHF.\par  \par Patient is here today with her daughter and son, she states feeling better after leaving the hospital. She is able to walk for about half a block with her walker, and can climb a flight of stairs without any SOB. Her weight has been stable at 180-181 lbs and her SBP fluctuates between 90's to 120's. She denies CP, SOB at rest, PND, abdominal discomfort, LH/dizziness, palpitations, and syncope. She takes furosemide 20 mg only as needed for weight gain; in the past two weeks she has taken it once. Her HR is in the low 50s (sinus), for which she takes the metoprolol every other day. \par \par She is following a low sodium diet and takes her medications as indicated.

## 2021-07-28 ENCOUNTER — RESULT REVIEW (OUTPATIENT)
Age: 86
End: 2021-07-28

## 2021-07-28 ENCOUNTER — OUTPATIENT (OUTPATIENT)
Dept: OUTPATIENT SERVICES | Facility: HOSPITAL | Age: 86
LOS: 1 days | Discharge: HOME | End: 2021-07-28
Payer: MEDICARE

## 2021-07-28 VITALS
HEIGHT: 59 IN | DIASTOLIC BLOOD PRESSURE: 70 MMHG | SYSTOLIC BLOOD PRESSURE: 112 MMHG | HEART RATE: 56 BPM | WEIGHT: 171.08 LBS | RESPIRATION RATE: 17 BRPM | OXYGEN SATURATION: 98 % | TEMPERATURE: 96 F

## 2021-07-28 VITALS — DIASTOLIC BLOOD PRESSURE: 61 MMHG | SYSTOLIC BLOOD PRESSURE: 114 MMHG | HEART RATE: 65 BPM

## 2021-07-28 DIAGNOSIS — Z96.652 PRESENCE OF LEFT ARTIFICIAL KNEE JOINT: Chronic | ICD-10-CM

## 2021-07-28 DIAGNOSIS — Z98.890 OTHER SPECIFIED POSTPROCEDURAL STATES: Chronic | ICD-10-CM

## 2021-07-28 DIAGNOSIS — Z96.651 PRESENCE OF RIGHT ARTIFICIAL KNEE JOINT: Chronic | ICD-10-CM

## 2021-07-28 DIAGNOSIS — C06.9 MALIGNANT NEOPLASM OF MOUTH, UNSPECIFIED: Chronic | ICD-10-CM

## 2021-07-28 PROCEDURE — 88307 TISSUE EXAM BY PATHOLOGIST: CPT | Mod: 26

## 2021-07-28 RX ORDER — ONDANSETRON 8 MG/1
4 TABLET, FILM COATED ORAL ONCE
Refills: 0 | Status: DISCONTINUED | OUTPATIENT
Start: 2021-07-28 | End: 2021-08-11

## 2021-07-28 RX ORDER — SODIUM CHLORIDE 9 MG/ML
1000 INJECTION, SOLUTION INTRAVENOUS
Refills: 0 | Status: DISCONTINUED | OUTPATIENT
Start: 2021-07-28 | End: 2021-08-11

## 2021-07-28 RX ORDER — MORPHINE SULFATE 50 MG/1
2 CAPSULE, EXTENDED RELEASE ORAL ONCE
Refills: 0 | Status: DISCONTINUED | OUTPATIENT
Start: 2021-07-28 | End: 2021-07-28

## 2021-07-28 RX ORDER — AZTREONAM 2 G
1 VIAL (EA) INJECTION
Qty: 10 | Refills: 0
Start: 2021-07-28 | End: 2021-08-01

## 2021-07-28 RX ORDER — HYDROMORPHONE HYDROCHLORIDE 2 MG/ML
0.5 INJECTION INTRAMUSCULAR; INTRAVENOUS; SUBCUTANEOUS
Refills: 0 | Status: DISCONTINUED | OUTPATIENT
Start: 2021-07-28 | End: 2021-07-28

## 2021-07-28 RX ORDER — OXYCODONE AND ACETAMINOPHEN 5; 325 MG/1; MG/1
1 TABLET ORAL ONCE
Refills: 0 | Status: DISCONTINUED | OUTPATIENT
Start: 2021-07-28 | End: 2021-07-28

## 2021-07-28 RX ORDER — PHENAZOPYRIDINE HCL 100 MG
1 TABLET ORAL
Qty: 9 | Refills: 0
Start: 2021-07-28 | End: 2021-07-30

## 2021-07-28 RX ADMIN — SODIUM CHLORIDE 100 MILLILITER(S): 9 INJECTION, SOLUTION INTRAVENOUS at 08:27

## 2021-07-28 NOTE — CHART NOTE - NSCHARTNOTEFT_GEN_A_CORE
PACU ANESTHESIA ADMISSION NOTE      Procedure:   Post op diagnosis:      ____  Intubated  TV:______       Rate: ______      FiO2: ______    __x__  Patent Airway    _x___  Full return of protective reflexes    _x___  Full recovery from anesthesia / back to baseline     Vitals:   T: 98          R:  16                BP:  115/57                Sat: 96                  P: 65      Mental Status:  _x___ Awake   ___x__ Alert   _____ Drowsy   _____ Sedated    Nausea/Vomiting:  ____ NO  ______Yes,   See Post - Op Orders          Pain Scale (0-10):  _____    Treatment: ____ None    ____ See Post - Op/PCA Orders    Post - Operative Fluids:   ____ Oral   ____ See Post - Op Orders    Plan: Discharge:  x ____Home       _____Floor     _____Critical Care    _____  Other:_________________    Comments:

## 2021-07-28 NOTE — ASU DISCHARGE PLAN (ADULT/PEDIATRIC) - CARE PROVIDER_API CALL
Wilbur Posadas)  Urology  [address] [place] [address]  Phone: [phone]  Fax: [phone]  Follow Up Time:

## 2021-07-28 NOTE — ASU PATIENT PROFILE, ADULT - PMH
Atrial flutter    Bladder polyp    High cholesterol    Kidney stone    Shingles  affecting right eye

## 2021-07-28 NOTE — ASU PATIENT PROFILE, ADULT - PSH
Cancer of mouth    H/O cystoscopy    H/O total knee replacement, left    H/O total knee replacement, right    History of cardioversion

## 2021-07-30 DIAGNOSIS — E78.00 PURE HYPERCHOLESTEROLEMIA, UNSPECIFIED: ICD-10-CM

## 2021-07-30 DIAGNOSIS — D49.4 NEOPLASM OF UNSPECIFIED BEHAVIOR OF BLADDER: ICD-10-CM

## 2021-07-30 DIAGNOSIS — I48.92 UNSPECIFIED ATRIAL FLUTTER: ICD-10-CM

## 2021-08-02 LAB — SURGICAL PATHOLOGY STUDY: SIGNIFICANT CHANGE UP

## 2021-09-09 NOTE — ASU PATIENT PROFILE, ADULT - MEDICATIONS BROUGHT TO HOSPITAL, PROFILE
Pulmonary Followup Note    Indication for visit:  Loculated pleural effusion    Subjective:  NAEON. No active complaints this am. Afeb, other vital stable.  potassium chloride  10 mEq IntraVENous Q1H    pantoprazole  40 mg IntraVENous Daily    spironolactone  100 mg Oral Daily    sodium chloride flush  5-40 mL IntraVENous 2 times per day    cefTRIAXone (ROCEPHIN) IV  1,000 mg IntraVENous Q24H    lactulose  20 g Oral TID    [Held by provider] rifaximin  200 mg Oral TID    albuterol  2.5 mg Nebulization 4x daily    furosemide  40 mg IntraVENous BID       Continuous Infusions:   sodium chloride 25 mL (09/09/21 1119)       PHYSICAL EXAMINATION:  /66   Pulse 85   Temp 97.5 °F (36.4 °C) (Axillary)   Resp 16   SpO2 95%     CONSTITUTIONAL:  awake, alert, not entirely cooperative, no apparent distress but will follow commands like stretch your arms out and he was able to read my name off my badge  EYES: Pupils equal round and reactive to light. NECK:  trachea midlinel  LUNGS:  no increased work of breathing. No accessory muscle use. Dec breath sounds on right side  CARDIOVASCULAR:  normal S1 and S2, no edema and no JVD  ABDOMEN:  distended , and no tenderness to palpation  SKIN:  Appears jaundiced    DATA  CBC:   Recent Labs     09/07/21 2225 09/07/21  2225 09/08/21  0755 09/08/21  1146 09/09/21  0631   WBC 11.8*  --  9.6  --  9.7   HGB 11.7*  --  10.3*  --  10.8*   HCT 34.6*  --  30.5*  --  31.0*   .7*  --  102.3*  --  101.5*   *   < > see below 73* 75*    < > = values in this interval not displayed.      BMP:   Recent Labs     09/07/21 2225 09/08/21  0008 09/08/21  0627 09/08/21  0755 09/08/21  1146 09/08/21  1848 09/09/21  0022 09/09/21  0631   *  --    < > 134*   < > 133* 135* 138   K 8.0* 3.9  --  3.3*  --   --   --  3.2*   CL 89*  --   --  96*  --   --   --  97*   CO2 29  --   --  30  --   --   --  32   PHOS  --   --   --  3.4  -- --   --  3.1   BUN 30*  --   --  28*  --   --   --  23*   CREATININE 0.7*  --   --  0.8*  --   --   --  0.6*    < > = values in this interval not displayed. No results for input(s): PHART, FQH5AWC, PO2ART in the last 72 hours. 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   -prior hx of strep pneumoniae  -Normally for hepatic hydrothorax (transudative) the treatment is diuretics but I will attempt diagnostic / therapeutic  thoracentesis for loculations today as prior tap was transudate with neutrophil predominant differential.   .     Will discuss with attending Dr. Erick Mujica MD PGY-2 no

## 2021-12-01 ENCOUNTER — APPOINTMENT (OUTPATIENT)
Dept: CARDIOLOGY | Facility: CLINIC | Age: 86
End: 2021-12-01
Payer: MEDICARE

## 2021-12-01 ENCOUNTER — RESULT CHARGE (OUTPATIENT)
Age: 86
End: 2021-12-01

## 2021-12-01 VITALS
TEMPERATURE: 96.8 F | BODY MASS INDEX: 36.31 KG/M2 | OXYGEN SATURATION: 97 % | HEART RATE: 52 BPM | DIASTOLIC BLOOD PRESSURE: 80 MMHG | HEIGHT: 58 IN | SYSTOLIC BLOOD PRESSURE: 110 MMHG | WEIGHT: 173 LBS

## 2021-12-01 PROCEDURE — 93000 ELECTROCARDIOGRAM COMPLETE: CPT

## 2021-12-01 PROCEDURE — 99214 OFFICE O/P EST MOD 30 MIN: CPT

## 2021-12-02 PROBLEM — I48.92 UNSPECIFIED ATRIAL FLUTTER: Chronic | Status: ACTIVE | Noted: 2021-07-28

## 2021-12-02 NOTE — HISTORY OF PRESENT ILLNESS
[FreeTextEntry1] : 86 F PMH HLD and HFpEF. Here for a follow up of diastolic HF and atrial paroxysmal afib. \par \par Patient reports feeling well. No new symptoms reported. Her exercise tolerance is limited but she can walk in the house with a walker. Son reports BP is stable, HR mostly 50s but rarely goes down to high 40s. She takes furosemide 2 times per week and metoprolol XL 12.5 mg daily. \par \par She is following a low sodium diet and takes her medications as indicated.

## 2021-12-28 ENCOUNTER — OUTPATIENT (OUTPATIENT)
Dept: OUTPATIENT SERVICES | Facility: HOSPITAL | Age: 86
LOS: 1 days | Discharge: HOME | End: 2021-12-28

## 2021-12-28 DIAGNOSIS — I50.32 CHRONIC DIASTOLIC (CONGESTIVE) HEART FAILURE: ICD-10-CM

## 2021-12-28 DIAGNOSIS — Z96.651 PRESENCE OF RIGHT ARTIFICIAL KNEE JOINT: Chronic | ICD-10-CM

## 2021-12-28 DIAGNOSIS — Z98.890 OTHER SPECIFIED POSTPROCEDURAL STATES: Chronic | ICD-10-CM

## 2021-12-28 DIAGNOSIS — C06.9 MALIGNANT NEOPLASM OF MOUTH, UNSPECIFIED: Chronic | ICD-10-CM

## 2021-12-28 DIAGNOSIS — Z96.652 PRESENCE OF LEFT ARTIFICIAL KNEE JOINT: Chronic | ICD-10-CM

## 2022-01-10 ENCOUNTER — OUTPATIENT (OUTPATIENT)
Dept: OUTPATIENT SERVICES | Facility: HOSPITAL | Age: 87
LOS: 1 days | Discharge: HOME | End: 2022-01-10

## 2022-01-10 DIAGNOSIS — Z96.652 PRESENCE OF LEFT ARTIFICIAL KNEE JOINT: Chronic | ICD-10-CM

## 2022-01-10 DIAGNOSIS — I50.32 CHRONIC DIASTOLIC (CONGESTIVE) HEART FAILURE: ICD-10-CM

## 2022-01-10 DIAGNOSIS — C06.9 MALIGNANT NEOPLASM OF MOUTH, UNSPECIFIED: Chronic | ICD-10-CM

## 2022-01-10 DIAGNOSIS — Z98.890 OTHER SPECIFIED POSTPROCEDURAL STATES: Chronic | ICD-10-CM

## 2022-01-10 DIAGNOSIS — Z96.651 PRESENCE OF RIGHT ARTIFICIAL KNEE JOINT: Chronic | ICD-10-CM

## 2022-01-13 ENCOUNTER — INPATIENT (INPATIENT)
Facility: HOSPITAL | Age: 87
LOS: 2 days | Discharge: HOME | End: 2022-01-16
Attending: INTERNAL MEDICINE | Admitting: INTERNAL MEDICINE
Payer: MEDICARE

## 2022-01-13 VITALS
SYSTOLIC BLOOD PRESSURE: 141 MMHG | RESPIRATION RATE: 20 BRPM | TEMPERATURE: 98 F | OXYGEN SATURATION: 99 % | HEIGHT: 59 IN | DIASTOLIC BLOOD PRESSURE: 63 MMHG | HEART RATE: 62 BPM

## 2022-01-13 DIAGNOSIS — C06.9 MALIGNANT NEOPLASM OF MOUTH, UNSPECIFIED: Chronic | ICD-10-CM

## 2022-01-13 DIAGNOSIS — Z98.890 OTHER SPECIFIED POSTPROCEDURAL STATES: Chronic | ICD-10-CM

## 2022-01-13 DIAGNOSIS — Z96.651 PRESENCE OF RIGHT ARTIFICIAL KNEE JOINT: Chronic | ICD-10-CM

## 2022-01-13 DIAGNOSIS — Z96.652 PRESENCE OF LEFT ARTIFICIAL KNEE JOINT: Chronic | ICD-10-CM

## 2022-01-13 LAB
ALBUMIN SERPL ELPH-MCNC: 4.1 G/DL — SIGNIFICANT CHANGE UP (ref 3.5–5.2)
ALP SERPL-CCNC: 121 U/L — HIGH (ref 30–115)
ALT FLD-CCNC: 13 U/L — SIGNIFICANT CHANGE UP (ref 0–41)
ANION GAP SERPL CALC-SCNC: 14 MMOL/L — SIGNIFICANT CHANGE UP (ref 7–14)
ANISOCYTOSIS BLD QL: SLIGHT — SIGNIFICANT CHANGE UP
APPEARANCE UR: CLEAR — SIGNIFICANT CHANGE UP
APTT BLD: 39.9 SEC — HIGH (ref 27–39.2)
AST SERPL-CCNC: 30 U/L — SIGNIFICANT CHANGE UP (ref 0–41)
BASOPHILS # BLD AUTO: 0 K/UL — SIGNIFICANT CHANGE UP (ref 0–0.2)
BASOPHILS NFR BLD AUTO: 0 % — SIGNIFICANT CHANGE UP (ref 0–1)
BILIRUB SERPL-MCNC: 0.4 MG/DL — SIGNIFICANT CHANGE UP (ref 0.2–1.2)
BILIRUB UR-MCNC: NEGATIVE — SIGNIFICANT CHANGE UP
BLD GP AB SCN SERPL QL: SIGNIFICANT CHANGE UP
BUN SERPL-MCNC: 30 MG/DL — HIGH (ref 10–20)
CALCIUM SERPL-MCNC: 9 MG/DL — SIGNIFICANT CHANGE UP (ref 8.5–10.1)
CHLORIDE SERPL-SCNC: 102 MMOL/L — SIGNIFICANT CHANGE UP (ref 98–110)
CO2 SERPL-SCNC: 22 MMOL/L — SIGNIFICANT CHANGE UP (ref 17–32)
COLOR SPEC: YELLOW — SIGNIFICANT CHANGE UP
CREAT SERPL-MCNC: 0.9 MG/DL — SIGNIFICANT CHANGE UP (ref 0.7–1.5)
DACRYOCYTES BLD QL SMEAR: SLIGHT — SIGNIFICANT CHANGE UP
DIFF PNL FLD: NEGATIVE — SIGNIFICANT CHANGE UP
EOSINOPHIL # BLD AUTO: 0 K/UL — SIGNIFICANT CHANGE UP (ref 0–0.7)
EOSINOPHIL NFR BLD AUTO: 0 % — SIGNIFICANT CHANGE UP (ref 0–8)
GIANT PLATELETS BLD QL SMEAR: PRESENT — SIGNIFICANT CHANGE UP
GLUCOSE SERPL-MCNC: 146 MG/DL — HIGH (ref 70–99)
GLUCOSE UR QL: NEGATIVE — SIGNIFICANT CHANGE UP
HCT VFR BLD CALC: 23.3 % — LOW (ref 37–47)
HGB BLD-MCNC: 6.9 G/DL — CRITICAL LOW (ref 12–16)
HYPOCHROMIA BLD QL: SIGNIFICANT CHANGE UP
INR BLD: 1.7 RATIO — HIGH (ref 0.65–1.3)
KETONES UR-MCNC: NEGATIVE — SIGNIFICANT CHANGE UP
LEUKOCYTE ESTERASE UR-ACNC: NEGATIVE — SIGNIFICANT CHANGE UP
LYMPHOCYTES # BLD AUTO: 1.14 K/UL — LOW (ref 1.2–3.4)
LYMPHOCYTES # BLD AUTO: 20.4 % — LOW (ref 20.5–51.1)
MANUAL SMEAR VERIFICATION: SIGNIFICANT CHANGE UP
MCHC RBC-ENTMCNC: 24.4 PG — LOW (ref 27–31)
MCHC RBC-ENTMCNC: 29.6 G/DL — LOW (ref 32–37)
MCV RBC AUTO: 82.3 FL — SIGNIFICANT CHANGE UP (ref 81–99)
MONOCYTES # BLD AUTO: 0.35 K/UL — SIGNIFICANT CHANGE UP (ref 0.1–0.6)
MONOCYTES NFR BLD AUTO: 6.2 % — SIGNIFICANT CHANGE UP (ref 1.7–9.3)
NEUTROPHILS # BLD AUTO: 4.09 K/UL — SIGNIFICANT CHANGE UP (ref 1.4–6.5)
NEUTROPHILS NFR BLD AUTO: 73.4 % — SIGNIFICANT CHANGE UP (ref 42.2–75.2)
NITRITE UR-MCNC: NEGATIVE — SIGNIFICANT CHANGE UP
NT-PROBNP SERPL-SCNC: 669 PG/ML — HIGH (ref 0–300)
OVALOCYTES BLD QL SMEAR: SLIGHT — SIGNIFICANT CHANGE UP
PH UR: 7 — SIGNIFICANT CHANGE UP (ref 5–8)
PLAT MORPH BLD: NORMAL — SIGNIFICANT CHANGE UP
PLATELET # BLD AUTO: 162 K/UL — SIGNIFICANT CHANGE UP (ref 130–400)
POLYCHROMASIA BLD QL SMEAR: SIGNIFICANT CHANGE UP
POTASSIUM SERPL-MCNC: 5.2 MMOL/L — HIGH (ref 3.5–5)
POTASSIUM SERPL-SCNC: 5.2 MMOL/L — HIGH (ref 3.5–5)
PROT SERPL-MCNC: 6.6 G/DL — SIGNIFICANT CHANGE UP (ref 6–8)
PROT UR-MCNC: NEGATIVE — SIGNIFICANT CHANGE UP
PROTHROM AB SERPL-ACNC: 19.5 SEC — HIGH (ref 9.95–12.87)
RBC # BLD: 2.83 M/UL — LOW (ref 4.2–5.4)
RBC # FLD: 18.9 % — HIGH (ref 11.5–14.5)
RBC BLD AUTO: ABNORMAL
SARS-COV-2 RNA SPEC QL NAA+PROBE: SIGNIFICANT CHANGE UP
SODIUM SERPL-SCNC: 138 MMOL/L — SIGNIFICANT CHANGE UP (ref 135–146)
SP GR SPEC: 1.01 — SIGNIFICANT CHANGE UP (ref 1.01–1.03)
TROPONIN T SERPL-MCNC: <0.01 NG/ML — SIGNIFICANT CHANGE UP
UROBILINOGEN FLD QL: SIGNIFICANT CHANGE UP
WBC # BLD: 5.57 K/UL — SIGNIFICANT CHANGE UP (ref 4.8–10.8)
WBC # FLD AUTO: 5.57 K/UL — SIGNIFICANT CHANGE UP (ref 4.8–10.8)

## 2022-01-13 PROCEDURE — 99285 EMERGENCY DEPT VISIT HI MDM: CPT | Mod: GC

## 2022-01-13 PROCEDURE — 99221 1ST HOSP IP/OBS SF/LOW 40: CPT | Mod: GC,AI

## 2022-01-13 PROCEDURE — 93010 ELECTROCARDIOGRAM REPORT: CPT

## 2022-01-13 PROCEDURE — 74174 CTA ABD&PLVS W/CONTRAST: CPT | Mod: 26,MA

## 2022-01-13 PROCEDURE — 71046 X-RAY EXAM CHEST 2 VIEWS: CPT | Mod: 26

## 2022-01-13 RX ORDER — FUROSEMIDE 40 MG
20 TABLET ORAL
Refills: 0 | Status: DISCONTINUED | OUTPATIENT
Start: 2022-01-13 | End: 2022-01-14

## 2022-01-13 RX ORDER — PANTOPRAZOLE SODIUM 20 MG/1
40 TABLET, DELAYED RELEASE ORAL
Refills: 0 | Status: DISCONTINUED | OUTPATIENT
Start: 2022-01-13 | End: 2022-01-16

## 2022-01-13 RX ORDER — SIMVASTATIN 20 MG/1
20 TABLET, FILM COATED ORAL AT BEDTIME
Refills: 0 | Status: DISCONTINUED | OUTPATIENT
Start: 2022-01-13 | End: 2022-01-16

## 2022-01-13 RX ORDER — METOPROLOL TARTRATE 50 MG
12.5 TABLET ORAL
Refills: 0 | Status: DISCONTINUED | OUTPATIENT
Start: 2022-01-13 | End: 2022-01-14

## 2022-01-13 RX ORDER — PREDNISOLONE SODIUM PHOSPHATE 1 %
1 DROPS OPHTHALMIC (EYE) DAILY
Refills: 0 | Status: DISCONTINUED | OUTPATIENT
Start: 2022-01-13 | End: 2022-01-16

## 2022-01-13 NOTE — H&P ADULT - NSHPREVIEWOFSYSTEMS_GEN_ALL_CORE
REVIEW OF SYSTEMS:    CONSTITUTIONAL: Weakness and fatigue   EYES/ENT: No visual changes;  No vertigo or throat pain   NECK: No pain or stiffness  RESPIRATORY: No cough, wheezing, hemoptysis; Shortness of breath   CARDIOVASCULAR: Chest pain , bilateral lower extremity swelling  GASTROINTESTINAL: No abdominal or epigastric pain. No nausea, vomiting, or hematemesis; No diarrhea or constipation. No melena or hematochezia.  GENITOURINARY: No dysuria, frequency or hematuria  NEUROLOGICAL: No numbness or weakness  SKIN: No itching, rashes

## 2022-01-13 NOTE — H&P ADULT - NSICDXPASTMEDICALHX_GEN_ALL_CORE_FT
PAST MEDICAL HISTORY:  Atrial flutter     Bladder polyp     High cholesterol     Kidney stone     Shingles affecting right eye

## 2022-01-13 NOTE — ED PROVIDER NOTE - PHYSICAL EXAMINATION
GENERAL: NAD   SKIN: warm, dry  HEAD: Normocephalic; atraumatic.  EYES: PERRLA, EOMI, no conjunctival erythema  CARD: S1, S2 normal; no murmurs, gallops, or rubs. Regular rate and rhythm.   RESP: LCTAB; No wheezes, rales, rhonchi, or stridor.  ABD: soft, nontender, and nondistended  Rectal: Normal stool noted on ANA PAULA, no melena or blood.   EXT: Bilateral LE pitting edema   NEURO: Alert, oriented, grossly unremarkable  PSYCH: Cooperative, appropriate. GENERAL: NAD   SKIN: warm, dry  HEAD: Normocephalic; atraumatic.  EYES: PERRLA, EOMI, no conjunctival erythema  CARD: S1, S2 normal; no murmurs, gallops, or rubs. Regular rate and rhythm.   RESP: LCTAB; No wheezes, rales, rhonchi, or stridor.  ABD: soft, nontender, and nondistended  Rectal: Normal stool noted on ANA PAULA, no melena or blood. Chaperoned by RN thomspon  EXT: Bilateral LE pitting edema   NEURO: Alert, oriented, grossly unremarkable  PSYCH: Cooperative, appropriate.

## 2022-01-13 NOTE — ED ADULT NURSE NOTE - OBJECTIVE STATEMENT
BIBA from home, as per EMS, pt's nephew called 911 because he is concerned that the pt takes xanax and drinks alcohol at the same time. pt currently has slurred speech, is intoxicated, has a history of extended drug use    MD JORDAN Objective Statement: 88 yo female w/ PMH of HLD, CHF, Afib on eliquis presents for low hemoglobin. Had labs performed by PCP a couple days ago, results returned today showing 6.5 hemoglobin. Hemoglobin on bloodwork 3 months ago was 12. For past week, has been intermittently dizzy with chest pain and SOB, currently asx.  Fell 3 weeks ago with bruising around face and abdomen which resolved, no recent trauma.  For past week, has noticed stool a little darker, denies abdominal pain, hematemesis, hemoptysis, blood in urine, and robbie blood in stool. No hx of anemia and has never received a blood transfusion.

## 2022-01-13 NOTE — H&P ADULT - NSHPLABSRESULTS_GEN_ALL_CORE
.  LABS:                         6.9    5.57  )-----------( 162      ( 2022 17:17 )             23.3         138  |  102  |  30<H>  ----------------------------<  146<H>  5.2<H>   |  22  |  0.9    Ca    9.0      2022 17:17    TPro  6.6  /  Alb  4.1  /  TBili  0.4  /  DBili  x   /  AST  30  /  ALT  13  /  AlkPhos  121<H>      PT/INR - ( 2022 17:17 )   PT: 19.50 sec;   INR: 1.70 ratio         PTT - ( 2022 17:17 )  PTT:39.9 sec  Urinalysis Basic - ( 2022 21:00 )    Color: Yellow / Appearance: Clear / S.010 / pH: x  Gluc: x / Ketone: Negative  / Bili: Negative / Urobili: <2 mg/dL   Blood: x / Protein: Negative / Nitrite: Negative   Leuk Esterase: Negative / RBC: x / WBC x   Sq Epi: x / Non Sq Epi: x / Bacteria: x      Serum Pro-Brain Natriuretic Peptide: 669 pg/mL ( @ 19:55)          RADIOLOGY, EKG & ADDITIONAL TESTS: Reviewed.

## 2022-01-13 NOTE — ED PROVIDER NOTE - NS ED ROS FT
Constitutional: No fevers, chills, or malaise.  HEENT: No headache, visual changes.  Cardiac: Reports chest pain and SOB in past week. Has leg edema at baseline.   Respiratory:  No cough, respiratory distress, or hemoptysis.  GI:  No nausea, vomiting, diarrhea, or abdominal pain.  :  No dysuria, frequency, or urgency.  MS:  No myalgia, muscle weakness  Neuro:  Reports dizziness in past week. No LOC, paralysis, or N/T.  Skin:  No skin rash.   Endocrine: No polyuria, polyphagia, or polydipsia.

## 2022-01-13 NOTE — ED PROVIDER NOTE - CLINICAL SUMMARY MEDICAL DECISION MAKING FREE TEXT BOX
In ED bp 141/63, HR 62, afebrile and O2 sat 99% on RA. CTA abdo/pelvis done with no evidence of active GI bleeding. Hg found to be 6.9. 1 U PRBCs ordered.  ADMIT.  Stable for floor.

## 2022-01-13 NOTE — H&P ADULT - NSHPPHYSICALEXAM_GEN_ALL_CORE
LOS:     VITALS:   T(C): 36.7 (01-13-22 @ 20:45), Max: 36.7 (01-13-22 @ 20:03)  HR: 61 (01-13-22 @ 20:45) (61 - 62)  BP: 164/69 (01-13-22 @ 20:45) (122/62 - 164/69)  RR: 18 (01-13-22 @ 20:45) (18 - 20)  SpO2: 98% (01-13-22 @ 20:45) (98% - 99%)    GENERAL: NAD, lying in bed comfortably  HEAD:  Atraumatic, Normocephalic  EYES: EOMI  ENT: Moist mucous membranes  NECK: Supple, No JVD  CHEST/LUNG: Unlabored respiration, slight crackles heard   HEART: Regular rate and rhythm; No murmurs, rubs, or gallops  ABDOMEN: BSx4; Soft, nontender, nondistended  EXTREMITIES:  3+ bilateral LE edema   NERVOUS SYSTEM:  A&Ox3, no focal deficits   SKIN: No rashes or lesions

## 2022-01-13 NOTE — ED ADULT NURSE NOTE - NSIMPLEMENTINTERV_GEN_ALL_ED
Implemented All Fall with Harm Risk Interventions:  [place] to call system. Call bell, personal items and telephone within reach. Instruct patient to call for assistance. Room bathroom lighting operational. Non-slip footwear when patient is off stretcher. Physically safe environment: no spills, clutter or unnecessary equipment. Stretcher in lowest position, wheels locked, appropriate side rails in place. Provide visual cue, wrist band, yellow gown, etc. Monitor gait and stability. Monitor for mental status changes and reorient to person, place, and time. Review medications for side effects contributing to fall risk. Reinforce activity limits and safety measures with patient and family. Provide visual clues: red socks.

## 2022-01-13 NOTE — ED PROVIDER NOTE - ATTENDING CONTRIBUTION TO CARE
86 F PMH HFpEF, Afib on Eliquis, HTN, and HLD presenting for evaluation of hgb of 6.5. Last known hemoglobin in July 2021 was ~12. Denies hematochezia, melena, hematuria. Reports intermittent chest pain, shortness of breath and increased fatigue. Has no history of anemia and has never received a blood transfusion. Last colonoscopy was about 6 years ago which was normal.  PE:  agree with above.  A/P:  Suspected GI bleed, on anticoagulation.  Labs, GI and Reassess.

## 2022-01-13 NOTE — ED ADULT NURSE REASSESSMENT NOTE - NS ED NURSE REASSESS COMMENT FT1
Patient receiving PRBC's. Patient reported to RN that RUE IV access "did not look right". When IV assessed, patient noted with swelling and bruising around IV site. IV access removed and warm compress applied to site.

## 2022-01-13 NOTE — H&P ADULT - ASSESSMENT
86 F PMH HFpEF, Afib on Eliquis, HTN, and HLD presenting for evaluation of hgb of 6.5. Last Known hemoglobin in July 12.      #Symptomatic Anemia   - Hg of 6.9, pending 1U prbcs  -No evidence of active GI bleed on CTA abd/pelvis   - F/u iron studies, folate, b12, GI consult  -keep active type & screen     #Hx of Afib  #Hx of HLD  #Hx of HFpEF  -last echo May 2021- Grade II diastolic dysfunction   -c/w Toprol 25 qd   -c/w statin   - hold Eliquis      Diet: Clear liquid   GI ppx: Protonix   Activity: Ambulate as tolerated   DVT ppx: SCDs   Dispo: pending GI eval, iron studies    86 F PMH HFpEF, Afib on Eliquis, HTN, and HLD presenting for evaluation of hgb of 6.5. Last Known hemoglobin in July 12.      #Symptomatic Anemia   - Hg of 6.9, pending 1U prbcs  -No evidence of active GI bleed on CTA abd/pelvis   - F/u iron studies, folate, b12, repeat CBC in AM  - F/u GI consult  -keep active type & screen     #Hx of Afib  #Hx of HLD  #Hx of HFpEF  -last echo May 2021- Grade II diastolic dysfunction   -+3 bilateral LE edema, SOB  - f/u HF consult . Follows with [facility]   -c/w Toprol 12.5 -User scheduled   -Usually takes lasix 20 mg 2x a week, will increase to 3x (MWF )  -c/w statin qd   - hold Eliquis      Diet: Clear liquid   GI ppx: Protonix   Activity: Ambulate as tolerated   DVT ppx: SCDs   Dispo: pending GI & HF  eval, labs

## 2022-01-13 NOTE — ED PROVIDER NOTE - OBJECTIVE STATEMENT
86 yo female w/ PMH of HLD, CHF, Afib on eliquis presents for low hemoglobin. Had labs performed by PCP a couple days ago, results returned today showing 6.5 hemoglobin.  For past week, has been intermittently dizzy with chest pain and SOB, currently asx.  Fell 3 weeks ago with bruising around face and abdomen which resolved, no recent trauma.  For past week, has noticed stool a little darker, denies abdominal pain, hematemesis, hemoptysis, blood in urine, and robbie blood in stool. No hx of anemia and has never received a blood transfusion. 86 yo female w/ PMH of HLD, CHF, Afib on eliquis presents for low hemoglobin. Had labs performed by PCP a couple days ago, results returned today showing 6.5 hemoglobin. Hemoglobin on bloodwork 3 months ago was 12. For past week, has been intermittently dizzy with chest pain and SOB, currently asx.  Fell 3 weeks ago with bruising around face and abdomen which resolved, no recent trauma.  For past week, has noticed stool a little darker, denies abdominal pain, hematemesis, hemoptysis, blood in urine, and robbie blood in stool. No hx of anemia and has never received a blood transfusion.

## 2022-01-13 NOTE — H&P ADULT - ATTENDING COMMENTS
HPI:  86 F PMH HFpEF, Afib on Eliquis, HTN, and HLD presenting for evaluation of hgb of 6.5. Last known hemoglobin in July 2021 was ~12. Denies hematochezia, melena, hematuria. Reports intermitted chest pain, shortness of breath and lightheadedness. Has no history of anemia and has never received a blood transfusion. Last colonoscopy was about 6 years ago which was normal.     In ED bp 141/63, HR 62, afebrile and O2 sat 99% on RA. CTA abdo/pelvis done with no evidence of active GI bleeding. Hg found to be 6.9. 1 U PRBCs ordered.  (13 Jan 2022 21:54)    REVIEW OF SYSTEMS: see cc./HPI   CONSTITUTIONAL: No weakness, fevers or chills  EYES/ENT: No visual changes;  No vertigo or throat pain   NECK: No pain or stiffness  RESPIRATORY: No cough, wheezing, hemoptysis; No shortness of breath  CARDIOVASCULAR: No chest pain or palpitations  GASTROINTESTINAL: No abdominal or epigastric pain. No nausea, vomiting, or hematemesis; No diarrhea or constipation. (+) melena or hematochezia.  GENITOURINARY: No dysuria, frequency or hematuria  NEUROLOGICAL: No numbness or weakness  SKIN: No itching, rashes    Physical Exam:   General: WN/WD NAD  Neurology: A&Ox3, nonfocal, follows commands  Eyes: PERRLA/ EOMI  ENT/Neck: Neck supple, trachea midline, No JVD  Respiratory: CTA B/L, No wheezing, rales, rhonchi  CV: Normal rate regular rhythm, S1S2, no murmurs, rubs or gallops  Abdominal: Soft, NT, ND +BS,   Extremities: No edema, + peripheral pulses  Skin: No Rashes, Hematoma, Ecchymosis  Incisions:   Tubes: HPI:  86 F PMH HFpEF, Afib on Eliquis, HTN, and HLD presenting for evaluation of hgb of 6.5. Last known hemoglobin in July 2021 was ~12. Denies hematochezia, melena, hematuria. Reports intermitted chest pain, shortness of breath and lightheadedness. Has no history of anemia and has never received a blood transfusion. Last colonoscopy was about 6 years ago which was normal.     In ED bp 141/63, HR 62, afebrile and O2 sat 99% on RA. CTA abdo/pelvis done with no evidence of active GI bleeding. Hg found to be 6.9. 1 U PRBCs ordered.  (13 Jan 2022 21:54)    REVIEW OF SYSTEMS: see cc./HPI   CONSTITUTIONAL: No weakness, fevers or chills  EYES/ENT: No visual changes;  No vertigo or throat pain   NECK: No pain or stiffness  RESPIRATORY: No cough, wheezing, hemoptysis; No shortness of breath  CARDIOVASCULAR: No chest pain or palpitations  GASTROINTESTINAL: No abdominal or epigastric pain. No nausea, vomiting, or hematemesis; No diarrhea or constipation. (+) melena or hematochezia.  GENITOURINARY: No dysuria, frequency or hematuria  NEUROLOGICAL: No numbness or weakness  SKIN: No itching, rashes    Physical Exam:   General: WN/WD NAD  Neurology: A&Ox2, nonfocal, follows commands, [place]  Eyes: PERRLA/ EOMI  ENT/Neck: Neck supple, trachea midline, No JVD  Respiratory: CTA B/L, No wheezing, rales, rhonchi  CV: Normal rate regular rhythm, S1S2, no murmurs, rubs or gallops  Abdominal: Soft, NT, ND +BS,   Extremities: (+) pedal edema and compression stocking present to bilateral LEs, + peripheral pulses  Skin: No Rashes, Hematoma, Ecchymosis    A/p  Symptomatic anemia on patient on Eliquis   Acute blood loss / GI bleed - patient describes melena - hemodynamically stable for the floor and CTA - showing no acute bleeding    -serial CBC, goal Hgb > 8   -IV lasix after PRBC   -GI eval   -Surgical eval PRN   -agree w/ iron studies and check TSH, retic %   -Care discussed w/ daughter and son at bedside - Nurse and Physician respectively.    H/o A. fib / HRpEF ( appear optimal w/ no symptoms of acute overload, chronic pedal edema present)  -IV Lasix after PRBC   -Heart failure team- PRN   -maintain other HF Rx     Dyslipidemia - statin     DVT prophylaxis     seen 1/13/22 ( note revised 1/14/22)

## 2022-01-13 NOTE — H&P ADULT - HISTORY OF PRESENT ILLNESS
86 F PMH HFpEF, Afib on Eliquis, HTN, and HLD presenting for evaluation of hgb of 6.5. Last known hemoglobin in July 2021 was ~12. Denies hematochezia, melena, hematuria. Reports intermitted chest pain, shortness of breath and lightheadedness. Has no history of anemia and has never received a blood transfusion. Last colonoscopy was about 6 years ago which was normal.     In ED bp 141/63, HR 62, afebrile and O2 sat 99% on RA. CTA abdo/pelvis done with no evidence of active GI bleeding. Hg found to be 6.9. 1 U PRBCs ordered.  86 F PMH HFpEF, Afib on Eliquis, HTN, and HLD presenting for evaluation of hgb of 6.5. Last known hemoglobin in July 2021 was ~12. Denies hematochezia, melena, hematuria. Reports intermitted chest pain, shortness of breath and increased fatigue. Has no history of anemia and has never received a blood transfusion. Last colonoscopy was about 6 years ago which was normal.     In ED bp 141/63, HR 62, afebrile and O2 sat 99% on RA. CTA abdo/pelvis done with no evidence of active GI bleeding. Hg found to be 6.9. 1 U PRBCs ordered.  86 F PMH HFpEF, Afib on Eliquis, HTN, and HLD presenting for evaluation of hgb of 6.5. Last known hemoglobin in July 2021 was ~12. Denies hematochezia, melena, hematuria. Reports intermittent chest pain, shortness of breath and increased fatigue. Has no history of anemia and has never received a blood transfusion. Last colonoscopy was about 6 years ago which was normal.     In ED bp 141/63, HR 62, afebrile and O2 sat 99% on RA. CTA abdo/pelvis done with no evidence of active GI bleeding. Hg found to be 6.9. 1 U PRBCs ordered.

## 2022-01-13 NOTE — ED PROVIDER NOTE - PROGRESS NOTE DETAILS
Maximo: received signout from Dr. Stern. CTA negative for acute GI bleed. Pt reports her last colonoscopy was 6 years ago, was normal at the time.  Cardiologist: Dr. [facility]. No GI doctor.  Pt hemodynamically stable, HR 60s on monitor, BP 110s/70s, mentating normally. Abd soft/nt/nd. Lung exam significant for R sided cardiac wheeze.   Plan is admission for workup for anemia. Endorsed to [facility].

## 2022-01-14 LAB
ALBUMIN SERPL ELPH-MCNC: 3.8 G/DL — SIGNIFICANT CHANGE UP (ref 3.5–5.2)
ALP SERPL-CCNC: 116 U/L — HIGH (ref 30–115)
ALT FLD-CCNC: 11 U/L — SIGNIFICANT CHANGE UP (ref 0–41)
ANION GAP SERPL CALC-SCNC: 14 MMOL/L — SIGNIFICANT CHANGE UP (ref 7–14)
AST SERPL-CCNC: 17 U/L — SIGNIFICANT CHANGE UP (ref 0–41)
BASOPHILS # BLD AUTO: 0.06 K/UL — SIGNIFICANT CHANGE UP (ref 0–0.2)
BASOPHILS NFR BLD AUTO: 1.1 % — HIGH (ref 0–1)
BILIRUB SERPL-MCNC: 0.5 MG/DL — SIGNIFICANT CHANGE UP (ref 0.2–1.2)
BUN SERPL-MCNC: 27 MG/DL — HIGH (ref 10–20)
CALCIUM SERPL-MCNC: 9.1 MG/DL — SIGNIFICANT CHANGE UP (ref 8.5–10.1)
CHLORIDE SERPL-SCNC: 104 MMOL/L — SIGNIFICANT CHANGE UP (ref 98–110)
CO2 SERPL-SCNC: 21 MMOL/L — SIGNIFICANT CHANGE UP (ref 17–32)
CREAT SERPL-MCNC: 0.8 MG/DL — SIGNIFICANT CHANGE UP (ref 0.7–1.5)
EOSINOPHIL # BLD AUTO: 0.12 K/UL — SIGNIFICANT CHANGE UP (ref 0–0.7)
EOSINOPHIL NFR BLD AUTO: 2.2 % — SIGNIFICANT CHANGE UP (ref 0–8)
FERRITIN SERPL-MCNC: 48 NG/ML — SIGNIFICANT CHANGE UP (ref 15–150)
FOLATE SERPL-MCNC: 15.8 NG/ML — SIGNIFICANT CHANGE UP
GLUCOSE SERPL-MCNC: 83 MG/DL — SIGNIFICANT CHANGE UP (ref 70–99)
HCT VFR BLD CALC: 23.9 % — LOW (ref 37–47)
HCT VFR BLD CALC: 25.1 % — LOW (ref 37–47)
HGB BLD-MCNC: 6.8 G/DL — CRITICAL LOW (ref 12–16)
HGB BLD-MCNC: 7.2 G/DL — LOW (ref 12–16)
IMM GRANULOCYTES NFR BLD AUTO: 0.2 % — SIGNIFICANT CHANGE UP (ref 0.1–0.3)
IRON SATN MFR SERPL: 26 UG/DL — LOW (ref 35–150)
IRON SATN MFR SERPL: 6 % — LOW (ref 15–50)
LYMPHOCYTES # BLD AUTO: 1.7 K/UL — SIGNIFICANT CHANGE UP (ref 1.2–3.4)
LYMPHOCYTES # BLD AUTO: 30.9 % — SIGNIFICANT CHANGE UP (ref 20.5–51.1)
MAGNESIUM SERPL-MCNC: 2.1 MG/DL — SIGNIFICANT CHANGE UP (ref 1.8–2.4)
MCHC RBC-ENTMCNC: 23.9 PG — LOW (ref 27–31)
MCHC RBC-ENTMCNC: 24 PG — LOW (ref 27–31)
MCHC RBC-ENTMCNC: 28.5 G/DL — LOW (ref 32–37)
MCHC RBC-ENTMCNC: 28.7 G/DL — LOW (ref 32–37)
MCV RBC AUTO: 83.7 FL — SIGNIFICANT CHANGE UP (ref 81–99)
MCV RBC AUTO: 84.2 FL — SIGNIFICANT CHANGE UP (ref 81–99)
MONOCYTES # BLD AUTO: 0.61 K/UL — HIGH (ref 0.1–0.6)
MONOCYTES NFR BLD AUTO: 11.1 % — HIGH (ref 1.7–9.3)
NEUTROPHILS # BLD AUTO: 3.01 K/UL — SIGNIFICANT CHANGE UP (ref 1.4–6.5)
NEUTROPHILS NFR BLD AUTO: 54.5 % — SIGNIFICANT CHANGE UP (ref 42.2–75.2)
NRBC # BLD: 0 /100 WBCS — SIGNIFICANT CHANGE UP (ref 0–0)
NRBC # BLD: 0 /100 WBCS — SIGNIFICANT CHANGE UP (ref 0–0)
PLATELET # BLD AUTO: 150 K/UL — SIGNIFICANT CHANGE UP (ref 130–400)
PLATELET # BLD AUTO: 155 K/UL — SIGNIFICANT CHANGE UP (ref 130–400)
POTASSIUM SERPL-MCNC: 4.6 MMOL/L — SIGNIFICANT CHANGE UP (ref 3.5–5)
POTASSIUM SERPL-SCNC: 4.6 MMOL/L — SIGNIFICANT CHANGE UP (ref 3.5–5)
PROT SERPL-MCNC: 6.4 G/DL — SIGNIFICANT CHANGE UP (ref 6–8)
RBC # BLD: 2.84 M/UL — LOW (ref 4.2–5.4)
RBC # BLD: 3 M/UL — LOW (ref 4.2–5.4)
RBC # FLD: 18.6 % — HIGH (ref 11.5–14.5)
RBC # FLD: 18.6 % — HIGH (ref 11.5–14.5)
SODIUM SERPL-SCNC: 139 MMOL/L — SIGNIFICANT CHANGE UP (ref 135–146)
TIBC SERPL-MCNC: 400 UG/DL — SIGNIFICANT CHANGE UP (ref 220–430)
UIBC SERPL-MCNC: 374 UG/DL — HIGH (ref 110–370)
VIT B12 SERPL-MCNC: 515 PG/ML — SIGNIFICANT CHANGE UP (ref 232–1245)
WBC # BLD: 4.54 K/UL — LOW (ref 4.8–10.8)
WBC # BLD: 5.51 K/UL — SIGNIFICANT CHANGE UP (ref 4.8–10.8)
WBC # FLD AUTO: 4.54 K/UL — LOW (ref 4.8–10.8)
WBC # FLD AUTO: 5.51 K/UL — SIGNIFICANT CHANGE UP (ref 4.8–10.8)

## 2022-01-14 PROCEDURE — 99223 1ST HOSP IP/OBS HIGH 75: CPT

## 2022-01-14 PROCEDURE — 99233 SBSQ HOSP IP/OBS HIGH 50: CPT

## 2022-01-14 RX ORDER — APIXABAN 2.5 MG/1
5 TABLET, FILM COATED ORAL
Refills: 0 | Status: DISCONTINUED | OUTPATIENT
Start: 2022-01-14 | End: 2022-01-14

## 2022-01-14 RX ORDER — FUROSEMIDE 40 MG
40 TABLET ORAL ONCE
Refills: 0 | Status: COMPLETED | OUTPATIENT
Start: 2022-01-14 | End: 2022-01-14

## 2022-01-14 RX ORDER — ERGOCALCIFEROL 1.25 MG/1
0 CAPSULE ORAL
Qty: 0 | Refills: 0 | DISCHARGE

## 2022-01-14 RX ORDER — METOPROLOL TARTRATE 50 MG
12.5 TABLET ORAL
Refills: 0 | Status: DISCONTINUED | OUTPATIENT
Start: 2022-01-14 | End: 2022-01-16

## 2022-01-14 RX ORDER — FUROSEMIDE 40 MG
20 TABLET ORAL EVERY 12 HOURS
Refills: 0 | Status: COMPLETED | OUTPATIENT
Start: 2022-01-14 | End: 2022-01-15

## 2022-01-14 RX ORDER — PREDNISOLONE SODIUM PHOSPHATE 1 %
0 DROPS OPHTHALMIC (EYE)
Qty: 0 | Refills: 0 | DISCHARGE

## 2022-01-14 RX ORDER — CHLORHEXIDINE GLUCONATE 213 G/1000ML
1 SOLUTION TOPICAL
Refills: 0 | Status: DISCONTINUED | OUTPATIENT
Start: 2022-01-14 | End: 2022-01-16

## 2022-01-14 RX ADMIN — Medication 1 DROP(S): at 11:38

## 2022-01-14 RX ADMIN — PANTOPRAZOLE SODIUM 40 MILLIGRAM(S): 20 TABLET, DELAYED RELEASE ORAL at 06:09

## 2022-01-14 RX ADMIN — SIMVASTATIN 20 MILLIGRAM(S): 20 TABLET, FILM COATED ORAL at 21:31

## 2022-01-14 RX ADMIN — Medication 20 MILLIGRAM(S): at 17:30

## 2022-01-14 RX ADMIN — Medication 12.5 MILLIGRAM(S): at 08:18

## 2022-01-14 RX ADMIN — Medication 40 MILLIGRAM(S): at 11:38

## 2022-01-14 NOTE — PATIENT PROFILE ADULT - FALL HARM RISK - ATTEMPT OOB
Please read your discharge instructions. Diet as tolerated. Activity as tolerated. Keep hydrated. OTC acetaminophen as needed for discomfort. Take your home medications as prescribed. Follow up with your doctor in the next 3-5 days to discuss your symptoms, your ED visit, your medication use, as well as to discuss any further evaluation and treatment. Return to the Emergency Department for persistent symptom(s) or any other problem.     No

## 2022-01-14 NOTE — CONSULT NOTE ADULT - ASSESSMENT
Acute on chronic diastolic heart failure/ atrial flutter/fluid overload     Get BMP daily   Maintain potassium >4.0, Mg >2.1  Strict intake and output  Daily weight     *Incomplete note* Acute on chronic diastolic heart failure/ atrial flutter/fluid overload     Slightly overloaded on exam   Give furosemide 20 mg iv BID x3 doses   Continue metoprolol 12.5 mg 5 days/week  Increase home dose of furosemide to 20 mg 3 times/week alternating with 2 times/week every other week   S/p 1 unit of PRBC / consider giving another unit since Hb is ~7 gr/dl  Check iron profile   GI evaluation for GI bleeding /anemia   Get BMP daily   Maintain potassium >4.0, Mg >2.1  Strict intake and output  Daily weight   Discussed with family and primary team

## 2022-01-14 NOTE — CONSULT NOTE ADULT - ATTENDING COMMENTS
pt with afib on eliquis, presented with low Hgb of unknown etiology. pt denies any black or bloody stool at home, currently ANA PAULA shows brown stool. anemia is normocytic, which goes against iron deficiency. recommend iron studies - if low iron pt will benefit from EGD/CF as outpt. can consider inpt endoscopic procedure if pt shows e/o active GIB.

## 2022-01-14 NOTE — PATIENT PROFILE ADULT - FALL HARM RISK - HARM RISK INTERVENTIONS

## 2022-01-14 NOTE — CONSULT NOTE ADULT - ASSESSMENT
86 F PMH HFpEF, Afib on Eliquis, HTN, and HLD presenting for evaluation of hgb of 6.5. Last Known hemoglobin in July 12.    #Normocytic anemia - No overt GI bleeding  - Hb baseline 11-12  - on admission 6.5. MCV 83  - no reported bleeding. ANA PAULA: brown stool  - last dose eliquis 11/13  - last CF as above    Rec  - Target Hb >8  - protonix 40 qdaily  - advance diet as tolerated  - please obtain iron studies, b12, folate, tsh  - pt will benefit from outpatient egd and colonoscopy electively. If overt evidence of bleeding while hospitalized w/ drop in hb will consider inpatient workup  - recall gi  - avoid constipation - have pt on miralax and senna x2 tabs at night    #Intra and extra hepatic ductal dilation - asymptomatic  - CTAP: CBD 1.1cm and mild dilation of intrahepatic ducts  - LFTS wnl. mild elevation in alp    Rec  - pt will benefit from outpatient MRI w/ MRCP

## 2022-01-15 LAB
ALBUMIN SERPL ELPH-MCNC: 4 G/DL — SIGNIFICANT CHANGE UP (ref 3.5–5.2)
ALP SERPL-CCNC: 118 U/L — HIGH (ref 30–115)
ALT FLD-CCNC: 11 U/L — SIGNIFICANT CHANGE UP (ref 0–41)
ANION GAP SERPL CALC-SCNC: 15 MMOL/L — HIGH (ref 7–14)
AST SERPL-CCNC: 20 U/L — SIGNIFICANT CHANGE UP (ref 0–41)
BASOPHILS # BLD AUTO: 0.06 K/UL — SIGNIFICANT CHANGE UP (ref 0–0.2)
BASOPHILS NFR BLD AUTO: 1.1 % — HIGH (ref 0–1)
BILIRUB SERPL-MCNC: 0.8 MG/DL — SIGNIFICANT CHANGE UP (ref 0.2–1.2)
BUN SERPL-MCNC: 23 MG/DL — HIGH (ref 10–20)
CALCIUM SERPL-MCNC: 9.2 MG/DL — SIGNIFICANT CHANGE UP (ref 8.5–10.1)
CHLORIDE SERPL-SCNC: 96 MMOL/L — LOW (ref 98–110)
CO2 SERPL-SCNC: 25 MMOL/L — SIGNIFICANT CHANGE UP (ref 17–32)
CREAT SERPL-MCNC: 1 MG/DL — SIGNIFICANT CHANGE UP (ref 0.7–1.5)
EOSINOPHIL # BLD AUTO: 0.1 K/UL — SIGNIFICANT CHANGE UP (ref 0–0.7)
EOSINOPHIL NFR BLD AUTO: 1.9 % — SIGNIFICANT CHANGE UP (ref 0–8)
GLUCOSE SERPL-MCNC: 88 MG/DL — SIGNIFICANT CHANGE UP (ref 70–99)
HAPTOGLOB SERPL-MCNC: 92 MG/DL — SIGNIFICANT CHANGE UP (ref 34–200)
HCT VFR BLD CALC: 28.6 % — LOW (ref 37–47)
HGB BLD-MCNC: 8.5 G/DL — LOW (ref 12–16)
IMM GRANULOCYTES NFR BLD AUTO: 0.4 % — HIGH (ref 0.1–0.3)
LDH SERPL L TO P-CCNC: 186 — SIGNIFICANT CHANGE UP (ref 50–242)
LYMPHOCYTES # BLD AUTO: 1.56 K/UL — SIGNIFICANT CHANGE UP (ref 1.2–3.4)
LYMPHOCYTES # BLD AUTO: 29.3 % — SIGNIFICANT CHANGE UP (ref 20.5–51.1)
MAGNESIUM SERPL-MCNC: 2 MG/DL — SIGNIFICANT CHANGE UP (ref 1.8–2.4)
MCHC RBC-ENTMCNC: 25 PG — LOW (ref 27–31)
MCHC RBC-ENTMCNC: 29.7 G/DL — LOW (ref 32–37)
MCV RBC AUTO: 84.1 FL — SIGNIFICANT CHANGE UP (ref 81–99)
MONOCYTES # BLD AUTO: 0.64 K/UL — HIGH (ref 0.1–0.6)
MONOCYTES NFR BLD AUTO: 12 % — HIGH (ref 1.7–9.3)
NEUTROPHILS # BLD AUTO: 2.94 K/UL — SIGNIFICANT CHANGE UP (ref 1.4–6.5)
NEUTROPHILS NFR BLD AUTO: 55.3 % — SIGNIFICANT CHANGE UP (ref 42.2–75.2)
NRBC # BLD: 0 /100 WBCS — SIGNIFICANT CHANGE UP (ref 0–0)
PLATELET # BLD AUTO: 144 K/UL — SIGNIFICANT CHANGE UP (ref 130–400)
POTASSIUM SERPL-MCNC: 4 MMOL/L — SIGNIFICANT CHANGE UP (ref 3.5–5)
POTASSIUM SERPL-SCNC: 4 MMOL/L — SIGNIFICANT CHANGE UP (ref 3.5–5)
PROT SERPL-MCNC: 6.5 G/DL — SIGNIFICANT CHANGE UP (ref 6–8)
RBC # BLD: 3.39 M/UL — LOW (ref 4.2–5.4)
RBC # BLD: 3.4 M/UL — LOW (ref 4.2–5.4)
RBC # FLD: 18.3 % — HIGH (ref 11.5–14.5)
RETICS #: 78.3 K/UL — SIGNIFICANT CHANGE UP (ref 25–125)
RETICS/RBC NFR: 2.3 % — HIGH (ref 0.5–1.5)
SODIUM SERPL-SCNC: 136 MMOL/L — SIGNIFICANT CHANGE UP (ref 135–146)
TSH SERPL-MCNC: 1.72 UIU/ML — SIGNIFICANT CHANGE UP (ref 0.27–4.2)
WBC # BLD: 5.32 K/UL — SIGNIFICANT CHANGE UP (ref 4.8–10.8)
WBC # FLD AUTO: 5.32 K/UL — SIGNIFICANT CHANGE UP (ref 4.8–10.8)

## 2022-01-15 PROCEDURE — 99222 1ST HOSP IP/OBS MODERATE 55: CPT

## 2022-01-15 PROCEDURE — 99233 SBSQ HOSP IP/OBS HIGH 50: CPT

## 2022-01-15 RX ORDER — IRON SUCROSE 20 MG/ML
100 INJECTION, SOLUTION INTRAVENOUS ONCE
Refills: 0 | Status: DISCONTINUED | OUTPATIENT
Start: 2022-01-15 | End: 2022-01-15

## 2022-01-15 RX ORDER — IRON SUCROSE 20 MG/ML
200 INJECTION, SOLUTION INTRAVENOUS
Refills: 0 | Status: DISCONTINUED | OUTPATIENT
Start: 2022-01-15 | End: 2022-01-16

## 2022-01-15 RX ADMIN — IRON SUCROSE 110 MILLIGRAM(S): 20 INJECTION, SOLUTION INTRAVENOUS at 12:43

## 2022-01-15 RX ADMIN — Medication 1 DROP(S): at 11:50

## 2022-01-15 RX ADMIN — Medication 12.5 MILLIGRAM(S): at 05:31

## 2022-01-15 RX ADMIN — Medication 20 MILLIGRAM(S): at 05:31

## 2022-01-15 RX ADMIN — PANTOPRAZOLE SODIUM 40 MILLIGRAM(S): 20 TABLET, DELAYED RELEASE ORAL at 06:07

## 2022-01-15 RX ADMIN — Medication 20 MILLIGRAM(S): at 17:31

## 2022-01-15 RX ADMIN — SIMVASTATIN 20 MILLIGRAM(S): 20 TABLET, FILM COATED ORAL at 22:06

## 2022-01-15 NOTE — PROGRESS NOTE ADULT - SUBJECTIVE AND OBJECTIVE BOX
CHIEF COMPLAINT:    Patient is a 87y old  Female who presents with a chief complaint of Symptomatic Anemia     INTERVAL HPI/OVERNIGHT EVENTS:    Patient seen and examined at bedside. No acute overnight events occurred.    ROS: Denies SOB, dizziness, chest pain, melena, BRPBR. All other systems are negative.    Vital Signs:    T(F): 97.3 (22 @ 06:11), Max: 98 (22 @ 20:03)  HR: 62 (22 @ 08:23) (60 - 65)  BP: 134/62 (22 @ 08:23) (107/49 - 164/69)  RR: 18 (22 @ 08:23) (18 - 20)  SpO2: 96% (22 @ 08:23) (96% - 100%)  I&O's Summary    2022 07:01  -  2022 13:16  --------------------------------------------------------  IN: 0 mL / OUT: 700 mL / NET: -700 mL      Daily Height in cm: 144.78 (2022 01:34)    Daily Weight in k.4 (2022 01:34)  CAPILLARY BLOOD GLUCOSE          PHYSICAL EXAM:  GENERAL:  NAD  SKIN: scar over knee  HEENT: Atraumatic. Normocephalic. Anicteric  NECK:  No JVD.   PULMONARY: Clear to ausculation bilaterally. No wheezing. No rales  CVS: Normal S1, S2. Regular rate and rhythm. No murmurs.  ABDOMEN/GI: Soft, Nontender, Nondistended; Bowel sounds are present  EXTREMITIES:  b/l non bitting edema  NEUROLOGIC:  No motor deficit.  PSYCH: Alert & oriented x 3, normal affect    Consultant(s) Notes Reviewed:  [x ] YES  [ ] NO  Care Discussed with Consultants/Other Providers [ x] YES  [ ] NO - GI    LABS:                        7.2    5.51  )-----------( 150      ( 2022 04:30 )             25.1         139  |  104  |  27<H>  ----------------------------<  83  4.6   |  21  |  0.8    Ca    9.1      2022 04:30  Mg     2.1         TPro  6.4  /  Alb  3.8  /  TBili  0.5  /  DBili  x   /  AST  17  /  ALT  11  /  AlkPhos  116<H>      PT/INR - ( 2022 17:17 )   PT: 19.50 sec;   INR: 1.70 ratio         PTT - ( 2022 17:17 )  PTT:39.9 sec  Serum Pro-Brain Natriuretic Peptide: 669 pg/mL (22 @ 19:55)    Trop <0.01, CKMB --, CK --, 22 @ 17:17        RADIOLOGY & ADDITIONAL TESTS:  Imaging or report Personally Reviewed:  [ ] YES  [ ] NO    Telemetry reviewed independently - no events    Medications:  Standing  chlorhexidine 4% Liquid 1 Application(s) Topical <User Schedule>  furosemide   Injectable 20 milliGRAM(s) IV Push every 12 hours  metoprolol succinate ER 12.5 milliGRAM(s) Oral <User Schedule>  pantoprazole    Tablet 40 milliGRAM(s) Oral before breakfast  prednisoLONE acetate 1% Suspension 1 Drop(s) Right EYE daily  simvastatin 20 milliGRAM(s) Oral at bedtime    PRN Meds      Case discussed with resident  Care discussed with pt        
LIZA HERNANDEZ 87y Female  MRN#: [id number]   CODE STATUS: FULL    Hospital Day: 2d    Pt is currently admitted with the primary diagnosis of symptomatic anemia.    SUBJECTIVE  HPI: 86 F PMH HFpEF, Afib on Eliquis, HTN, and HLD presenting for evaluation of hgb of 6.5. Last known hemoglobin in 2021 was ~12. Denies hematochezia, melena, hematuria. Reports intermittent chest pain, shortness of breath and increased fatigue. Has no history of anemia and has never received a blood transfusion. Last colonoscopy was about 6 years ago which was normal.     ED Course: bp 141/63, HR 62, afebrile and O2 sat 99% on RA. CTA abdo/pelvis done with no evidence of active GI bleeding. Hg found to be 6.9. 1 U PRBCs ordered. Admitted to telemetry for further monitoring and work-up.    Hospital Course: Repeat CBC s/p pRBC showed hgb of 7.2. GI consulted, no signs of GIB, consider o/p EGD/colonoscopy if iron studies are low.  Will consider inpatient scope if patient develops active bleeding.  Follows with Dr. Whiteside o/p for CHF, recommends Lasix 20mg IV BID x 3 doses for fluid overload.      Overnight events:  Repeat CBC showed hbg of 6.8.  Patient received 2nd unit pRBC.  Continuing to hold eliquis at this time.    Subjective complaints:  Patient seen and examined at bedside.  No new complaints this AM.    Present Today:   - Eckert:  No [ X ], Yes [   ] : Indication:     - Type of IV Access:       .. CVC/Piccline:  No [ X ], Yes [   ] : Indication:       .. Midline: No [ X ], Yes [   ] : Indication:                                             ----------------------------------------------------------  OBJECTIVE  PAST MEDICAL & SURGICAL HISTORY  High cholesterol    Kidney stone    Bladder polyp    Shingles  affecting right eye    Atrial flutter    H/O cystoscopy    H/O total knee replacement, right    H/O total knee replacement, left    Cancer of mouth    History of cardioversion                                              -----------------------------------------------------------  ALLERGIES:  No Known Allergies                                            ------------------------------------------------------------    HOME MEDICATIONS  Home Medications:  Lasix 20 mg oral tablet: 1 tab(s) orally 2 times a week (2022 11:01)  metoprolol succinate 25 mg oral tablet, extended release: 0.5 tab(s) orally Tuesday, Wednesday, Thursday, Saturday,  (2022 11:01)  prednisoLONE acetate 1% ophthalmic suspension: 1 drop(s) in the right eye once a day (2022 11:04)                           MEDICATIONS:  STANDING MEDICATIONS  chlorhexidine 4% Liquid 1 Application(s) Topical <User Schedule>  furosemide   Injectable 20 milliGRAM(s) IV Push every 12 hours  iron sucrose IVPB 100 milliGRAM(s) IV Intermittent once  metoprolol succinate ER 12.5 milliGRAM(s) Oral <User Schedule>  pantoprazole    Tablet 40 milliGRAM(s) Oral before breakfast  prednisoLONE acetate 1% Suspension 1 Drop(s) Right EYE daily  simvastatin 20 milliGRAM(s) Oral at bedtime    PRN MEDICATIONS                                            ------------------------------------------------------------  VITAL SIGNS: Last 24 Hours  T(C): 36.3 (15 Denzel 2022 05:30), Max: 36.5 (2022 13:32)  T(F): 97.3 (15 Denzel 2022 05:30), Max: 97.7 (2022 13:32)  HR: 67 (15 Denzel 2022 05:30) (61 - 89)  BP: 136/66 (15 Denzel 2022 05:30) (107/52 - 136/66)  BP(mean): --  RR: 18 (15 Denzel 2022 09:13) (18 - 18)  SpO2: 97% (15 Denzel 2022 09:13) (97% - 97%)      22 @ 07:01  -  01-15-22 @ 07:00  --------------------------------------------------------  IN: 670 mL / OUT: 3750 mL / NET: -3080 mL    01-15-22 @ 07:01  -  01-15-22 @ 10:52  --------------------------------------------------------  IN: 210 mL / OUT: 900 mL / NET: -690 mL                                             --------------------------------------------------------------  LABS:                        8.5    5.32  )-----------( 144      ( 15 Denzel 2022 06:19 )             28.6     01-15    136  |  96<L>  |  23<H>  ----------------------------<  88  4.0   |  25  |  1.0    Ca    9.2      15 Denzel 2022 06:19  Mg     2.0     01-15    TPro  6.5  /  Alb  4.0  /  TBili  0.8  /  DBili  x   /  AST  20  /  ALT  11  /  AlkPhos  118<H>  01-15    PT/INR - ( 2022 17:17 )   PT: 19.50 sec;   INR: 1.70 ratio         PTT - ( 2022 17:17 )  PTT:39.9 sec  Urinalysis Basic - ( 2022 21:00 )    Color: Yellow / Appearance: Clear / S.010 / pH: x  Gluc: x / Ketone: Negative  / Bili: Negative / Urobili: <2 mg/dL   Blood: x / Protein: Negative / Nitrite: Negative   Leuk Esterase: Negative / RBC: x / WBC x   Sq Epi: x / Non Sq Epi: x / Bacteria: x      CARDIAC MARKERS ( 2022 17:17 )  x     / <0.01 ng/mL / x     / x     / x                                                  -------------------------------------------------------------  RADIOLOGY:  ACC: [id number] EXAM:  CT ANGIO ABD PELV (W)AW IC                        PROCEDURE DATE:  2022    IMPRESSION:  No evidence of active GI bleeding.      ACC: [id number] EXAM:  XR CHEST PA LAT 2V                        PROCEDURE DATE:  2022    Impression: Enlargement of cardiac silhouette.  Right lower lung field opacity likely representing atelectasis.                                              --------------------------------------------------------------    PHYSICAL EXAM:  General: lying in bed, NAD  HEENT: nontraumatic, neck supple, conjunctiva clear  LUNGS: decreased breath sounds, no wheezing, no cough or increased WOB  HEART: RRR, S1/S2, holosystolic murmur detected  ABDOMEN: soft, nontender, nondistended  EXT: 3+ LE edema  NEURO: AAOx4, CN grossly intact  SKIN: no erythema                                           --------------------------------------------------------------    ASSESSMENT & PLAN  HPI: 86 F PMH HFpEF, Afib on Eliquis, HTN, and HLD presenting for evaluation of hgb of 6.5. Last known hemoglobin in 2021 was ~12. Denies hematochezia, melena, hematuria. Reports intermittent chest pain, shortness of breath and increased fatigue. Has no history of anemia and has never received a blood transfusion. Last colonoscopy was about 6 years ago which was normal. CTA abdo/pelvis done with no evidence of active GI bleeding. Hg found to be 6.9. 1 U PRBCs ordered. Admitted to telemetry for further monitoring and work-up.    #Symptomatic anemia  > CT abdomen/pelvis showed no evidence of active GIB  > ANA PAULA showed brown stool  > admission hgb 6.9 -> given 1u pRBCs -> repeat hgb 7.2  > : GI c/s, rec o/p EGD/colonoscopy if iron studies are low or inpt scope if pt develops signs of active GIB  > : 4PM repeat CBC showed hgb of 6.8 -> given 2nd unit pRBC -> repeat hgb 8.5  > 1/15: iron studies showed iron 26, TIBC 400, iron % sat 6, ferritin 48  - heme/onc consulted, starting venofer per recs, f/u further recs  - GI following, f/u further recs  - holding eliquis for now in setting of symptomatic anemia, c/w SCDs  - f/u LDH, haptoglobin, fibrinogen, and retic ct    #h/o HFpEF  > follows with Dr. Whiteside o/p  > per HF, give Lasix 20mg IV BID x 3 doses for fluid overload, will finish IV lasix on 1/15, okay to [employer] tele tomorrow,   - c/w lasix 20mg IV BID for 3 doses (through 1/15), then dc tele  - c/w BB and statin    #h/o HTN  - c/w metoprolol 12.5mg every Sun/Tues/Wed/Thurs/Sat    #h/o HLD  - c/w statin                                                                              ----------------------------------------------------  # DVT prophylaxis: holding therapeutic eliquis for now in setting of symptomatic anemia, c/w SCDs  # GI prophylaxis: protonix  # Diet: clear liquid diet  # Activity Score (AM-PAC): AAT  # Code status: FULL  # Disposition: acute for now, pending further work-up for continued acute drop in hgb                                                                             --------------------------------------------------------    # Handoff   - f/u further GI or heme/onc recs  - f/u LDH, haptoglobin, fibrinogen, and retic ct
LIZA HERNANDEZ 87y Female  MRN#: [id number]   CODE STATUS: FULL    Hospital Day: 1d    Pt is currently admitted with the primary diagnosis of symptomatic anemia.    SUBJECTIVE  HPI: 86 F PMH HFpEF, Afib on Eliquis, HTN, and HLD presenting for evaluation of hgb of 6.5. Last known hemoglobin in 2021 was ~12. Denies hematochezia, melena, hematuria. Reports intermittent chest pain, shortness of breath and increased fatigue. Has no history of anemia and has never received a blood transfusion. Last colonoscopy was about 6 years ago which was normal.     ED Course: bp 141/63, HR 62, afebrile and O2 sat 99% on RA. CTA abdo/pelvis done with no evidence of active GI bleeding. Hg found to be 6.9. 1 U PRBCs ordered. Admitted to telemetry for further monitoring and work-up.    Hospital Course: Repeat CBC s/p pRBC showed hgb of 7.2. GI consulted, no signs of GIB, consider o/p EGD/colonoscopy if iron studies are low.  Will consider inpatient scope if patient develops active bleeding.  Follows with Dr. Whiteside o/p for CHF, recommends Lasix 20mg IV BID x 3 doses for fluid overload.      Overnight events:  Patient admitted overnight.  Given 1u pRBCs.    Subjective complaints:  Patient seen and examined at bedside.  Denies any episodes of bleeding this AM including hematemesis, melena, or hematuria.  States she was experiencing SOB on exertion prior to admission and currently has LE edema.    Present Today:   - Eckert:  No [ X ], Yes [   ] : Indication:     - Type of IV Access:       .. CVC/Piccline:  No [ X ], Yes [   ] : Indication:       .. Midline: No [ X ], Yes [   ] : Indication:                                             ----------------------------------------------------------  OBJECTIVE  PAST MEDICAL & SURGICAL HISTORY  High cholesterol    Kidney stone    Bladder polyp    Shingles  affecting right eye    Atrial flutter    H/O cystoscopy    H/O total knee replacement, right    H/O total knee replacement, left    Cancer of mouth    History of cardioversion                                              -----------------------------------------------------------  ALLERGIES:  No Known Allergies                                            ------------------------------------------------------------    HOME MEDICATIONS  Home Medications:  Lasix 20 mg oral tablet: 1 tab(s) orally 2 times a week (2022 11:01)  metoprolol succinate 25 mg oral tablet, extended release: 0.5 tab(s) orally Tuesday, Wednesday, Thursday, Saturday,  (2022 11:01)  prednisoLONE acetate 1% ophthalmic suspension: 1 drop(s) in the right eye once a day (2022 11:04)                           MEDICATIONS:  STANDING MEDICATIONS  chlorhexidine 4% Liquid 1 Application(s) Topical <User Schedule>  furosemide   Injectable 20 milliGRAM(s) IV Push every 12 hours  metoprolol succinate ER 12.5 milliGRAM(s) Oral <User Schedule>  pantoprazole    Tablet 40 milliGRAM(s) Oral before breakfast  prednisoLONE acetate 1% Suspension 1 Drop(s) Right EYE daily  simvastatin 20 milliGRAM(s) Oral at bedtime    PRN MEDICATIONS                                            ------------------------------------------------------------  VITAL SIGNS: Last 24 Hours  T(C): 36.5 (2022 13:32), Max: 36.7 (2022 20:03)  T(F): 97.7 (2022 13:32), Max: 98 (2022 20:03)  HR: 89 (2022 13:32) (60 - 89)  BP: 117/64 (2022 13:32) (107/49 - 164/69)  BP(mean): 71 (2022 06:11) (71 - 87)  RR: 18 (2022 13:32) (18 - 20)  SpO2: 96% (2022 08:23) (96% - 100%)      22 @ 07:01  -  22 @ 13:48  --------------------------------------------------------  IN: 670 mL / OUT: 1000 mL / NET: -330 mL                                             --------------------------------------------------------------  LABS:                        7.2    5.51  )-----------( 150      ( 2022 04:30 )             25.1         139  |  104  |  27<H>  ----------------------------<  83  4.6   |  21  |  0.8    Ca    9.1      2022 04:30  Mg     2.1         TPro  6.4  /  Alb  3.8  /  TBili  0.5  /  DBili  x   /  AST  17  /  ALT  11  /  AlkPhos  116<H>      PT/INR - ( 2022 17:17 )   PT: 19.50 sec;   INR: 1.70 ratio         PTT - ( 2022 17:17 )  PTT:39.9 sec  Urinalysis Basic - ( 2022 21:00 )    Color: Yellow / Appearance: Clear / S.010 / pH: x  Gluc: x / Ketone: Negative  / Bili: Negative / Urobili: <2 mg/dL   Blood: x / Protein: Negative / Nitrite: Negative   Leuk Esterase: Negative / RBC: x / WBC x   Sq Epi: x / Non Sq Epi: x / Bacteria: x        Troponin T, Serum: <0.01 ng/mL (22 @ 17:17)          CARDIAC MARKERS ( 2022 17:17 )  x     / <0.01 ng/mL / x     / x     / x                                                  -------------------------------------------------------------  RADIOLOGY:  ACC: [id number] EXAM:  CT ANGIO ABD PELV (W)AW IC                        PROCEDURE DATE:  2022    IMPRESSION:  No evidence of active GI bleeding.      ACC: [id number] EXAM:  XR CHEST PA LAT 2V                        PROCEDURE DATE:  2022    Impression: Enlargement of cardiac silhouette.  Right lower lung field opacity likely representing atelectasis.                                              --------------------------------------------------------------    PHYSICAL EXAM:  General: lying in bed, NAD with daughter at bedside  HEENT: nontraumatic, neck supple, conjunctiva clear  LUNGS: decreased breath sounds, no wheezing, no cough or increased WOB  HEART: RRR, S1/S2, holosystolic murmur detected  ABDOMEN: soft, nontender, nondistended  EXT: 3+ LE edema  NEURO: AAOx4, CN grossly intact  SKIN: no erythema                                           --------------------------------------------------------------    ASSESSMENT & PLAN  HPI: 86 F PMH HFpEF, Afib on Eliquis, HTN, and HLD presenting for evaluation of hgb of 6.5. Last known hemoglobin in 2021 was ~12. Denies hematochezia, melena, hematuria. Reports intermittent chest pain, shortness of breath and increased fatigue. Has no history of anemia and has never received a blood transfusion. Last colonoscopy was about 6 years ago which was normal. CTA abdo/pelvis done with no evidence of active GI bleeding. Hg found to be 6.9. 1 U PRBCs ordered. Admitted to telemetry for further monitoring and work-up.    #Symptomatic anemia  > CT abdomen/pelvis showed no evidence of active GIB  > ANA PAULA showed brown stool  > admission hgb 6.9 -> given 1u pRBCs -> repeat hgb 7.2  > : GI c/s, rec o/p EGD/colonoscopy if iron studies are low or inpt scope if pt develops signs of active GIB  - f/u 4PM CBC  - f/u iron studies  - holding eliquis for now in setting of symptomatic anemia, c/w SCDs    #h/o HFpEF  > follows with Dr. Whiteside o/p  > per HF, give Lasix 20mg IV BID x 3 doses for fluid overload, okay to [employer] tele tomorrow, 1/15, following completion of IV lasix  - c/w lasix 20mg IV BID for 3 doses (through 1/15)  - c/w BB and statin    #h/o HTN  - c/w metoprolol 12.5mg every Sun/Tues/Wed/Thurs/Sat    #h/o HLD  - c/w statin                                                                              ----------------------------------------------------  # DVT prophylaxis: holding therapeutic eliquis for now in setting of symptomatic anemia, c/w SCDs  # GI prophylaxis: protonix  # Diet: clear liquid diet  # Activity Score (AM-PAC): AAT  # Code status: FULL  # Disposition: acute for now, pending iron studies and diuresis                                                                             --------------------------------------------------------    # Handoff   - f/u 4PM CBC  - f/u iron studies  
CHIEF COMPLAINT:    Patient is a 87y old  Female who presents with a chief complaint of Symptomatic Anemia     INTERVAL HPI/OVERNIGHT EVENTS:    Patient seen and examined at bedside. No acute overnight events occurred.    ROS: Denies SOB, chest pain, blood BM. All other systems are negative.    Vital Signs:    T(F): 96.6 (01-15-22 @ 13:46), Max: 97.3 (01-15-22 @ 05:30)  HR: 62 (01-15-22 @ 15:32) (61 - 67)  BP: 141/63 (01-15-22 @ 15:32) (107/52 - 141/63)  RR: 18 (01-15-22 @ 15:32) (18 - 18)  SpO2: 100% (01-15-22 @ 15:32) (97% - 100%)  I&O's Summary    14 Jan 2022 07:01  -  15 Denzel 2022 07:00  --------------------------------------------------------  IN: 670 mL / OUT: 3750 mL / NET: -3080 mL    15 Denzel 2022 07:01  -  15 Jan 2022 16:11  --------------------------------------------------------  IN: 450 mL / OUT: 1100 mL / NET: -650 mL        PHYSICAL EXAM:  GENERAL:  NAD  SKIN: Right arm bruised  HEENT: Atraumatic. Normocephalic. Anicteric  NECK:  No JVD.   PULMONARY: Clear to ausculation bilaterally. No wheezing. No rales  CVS: Normal S1, S2. Regular rate and rhythm. No murmurs.  ABDOMEN/GI: Soft, Nontender, Nondistended; Bowel sounds are present  EXTREMITIES:  No edema B/L LE.  NEUROLOGIC:  No motor deficit.  PSYCH: Alert & oriented x 3, normal affect    Consultant(s) Notes Reviewed:  [x ] YES  [ ] NO  Care Discussed with Consultants/Other Providers [ x] YES  [ ] NO - GI/heme onc    LABS:                        8.5    5.32  )-----------( 144      ( 15 Denzel 2022 06:19 )             28.6     01-15    136  |  96<L>  |  23<H>  ----------------------------<  88  4.0   |  25  |  1.0    Ca    9.2      15 Denzel 2022 06:19  Mg     2.0     01-15    TPro  6.5  /  Alb  4.0  /  TBili  0.8  /  DBili  x   /  AST  20  /  ALT  11  /  AlkPhos  118<H>  01-15    PT/INR - ( 13 Jan 2022 17:17 )   PT: 19.50 sec;   INR: 1.70 ratio         PTT - ( 13 Jan 2022 17:17 )  PTT:39.9 sec  Serum Pro-Brain Natriuretic Peptide: 669 pg/mL (01-13-22 @ 19:55)    Trop <0.01, CKMB --, CK --, 01-13-22 @ 17:17        RADIOLOGY & ADDITIONAL TESTS:  Imaging or report Personally Reviewed:  [ ] YES  [ ] NO    Telemetry reviewed independently - no events    Medications:  Standing  chlorhexidine 4% Liquid 1 Application(s) Topical <User Schedule>  furosemide   Injectable 20 milliGRAM(s) IV Push every 12 hours  iron sucrose IVPB 200 milliGRAM(s) IV Intermittent <User Schedule>  metoprolol succinate ER 12.5 milliGRAM(s) Oral <User Schedule>  pantoprazole    Tablet 40 milliGRAM(s) Oral before breakfast  prednisoLONE acetate 1% Suspension 1 Drop(s) Right EYE daily  simvastatin 20 milliGRAM(s) Oral at bedtime    PRN Meds      Case discussed with resident  Care discussed with pt

## 2022-01-15 NOTE — CONSULT NOTE ADULT - ASSESSMENT
86 F PMH HFpEF, Afib on Eliquis, HTN, and HLD presenting for evaluation of hgb of 6.5. Last known hemoglobin in July 2021 was ~12. Denies hematochezia, melena, hematuria. Hematology evaluation requested for anemia.     ASSESSMENT  #) Normocytic Anemia likely 2/2 to GI loss as evidenced by Iron Deficiency Anemia   Iron - Total Binding Capacity.: 400 ug/dL  % Saturation, Iron: 6 %  Iron Total, Serum: 26 ug/dL  Unsaturated Iron Binding Capacity: 374: Hemolyzed. Interpret with caution ug/dL  Ferritin, Serum: 48 ng/mL (01.14.22 @ 04:30)    #) A Fib on Eliquis     PLAN  - She has received a total of 2 units of pRBC since admission. Hemoglobin today is 8.5.   - As discussed above, her Ferritin was found to be only 48 with %sat of 6 indicating she is in fact iron deficient especially since these labs were drawn after receiving 1 unit of pRBCs  - She will need EGD/Colonoscopy with GI to identify a bleeding source (up to GI team if this is done as inpatient or outpatient)  - Please supplement with Venofer 200mg IV every 48 hours for total of 5 doses. First dose can be given today   - Would recommend to hold Eliquis for now as it can worsen bleeding especially if scope is to be outpatient.     She will be arranged for follow up for Venofer in clinic if discharged in the next 24 to 48 hours     Plan discussed with the patient, he daughter, and primary team

## 2022-01-15 NOTE — CONSULT NOTE ADULT - ATTENDING COMMENTS
This is an 87-year-old female on apixaban who presented with dark stools found to have a hemoglobin of 6.9 and status post blood transfusions and posttransfusion iron studies still demonstrate low iron saturation ferritin slightly above normal consistent with iron deficiency anemia.    She has a history of diastolic heart failure and is receiving Lasix for minor fluid overload from recent transfusions    Most recent hemoglobin is 8.5 she denies any active bleeding    Plan  #Iron deficiency anemia likely secondary to gastrointestinal bleeding given patient has been having black stools and also is on anticoagulation  -We will administer Venofer 200 mg IV once today  -She will need 4 more doses per day given every 48 hours and if goes home tomorrow we will arrange to see me as outpatient to complete course  -GI saw the patient and pending outpatient work-up  -Agree to hold apixaban for now until the source of bleeding is identified and treated  -Monitor CBC can keep hemoglobin greater than 7-8 depending on symptoms and if transfusion is needed will likely require Lasix posttransfusion to prevent overall

## 2022-01-15 NOTE — CONSULT NOTE ADULT - SUBJECTIVE AND OBJECTIVE BOX
Gastroenterology Consultation:    Patient is a 87y old  Female who presents with a chief complaint of Symptomatic Anemia (14 Jan 2022 08:34)        Admitted on: 01-13-22      HPI:  86 F PMH HFpEF, Afib on Eliquis, HTN, and HLD presenting for evaluation of hgb of 6.5. Last known hemoglobin in July 2021 was ~12. Denies hematochezia, melena, hematuria. Reports intermittent chest pain, shortness of breath and increased fatigue. Has no history of anemia and has never received a blood transfusion. Last colonoscopy was about 6 years ago which was normal.     In ED bp 141/63, HR 62, afebrile and O2 sat 99% on RA. CTA abdo/pelvis done with no evidence of active GI bleeding. Hg found to be 6.9. 1 U PRBCs ordered.  (13 Jan 2022 21:54)        Prior EGD: none     Prior Colonoscopy: 7 years ago. normal per patient      PAST MEDICAL & SURGICAL HISTORY:  High cholesterol    Kidney stone    Bladder polyp    Shingles  affecting right eye    Atrial flutter    H/O cystoscopy    H/O total knee replacement, right    H/O total knee replacement, left    Cancer of mouth    History of cardioversion          FAMILY HISTORY:      Social History:  Tobacco: denies  Alcohol: denies   Drugs: deneis    Home Medications:  Lasix 20 mg oral tablet: 1 tab(s) orally 2 times a week (14 Jan 2022 11:01)  metoprolol succinate 25 mg oral tablet, extended release: 0.5 tab(s) orally Tuesday, Wednesday, Thursday, Saturday, Sunday (14 Jan 2022 11:01)  prednisoLONE acetate 1% ophthalmic suspension: 1 drop(s) in the right eye once a day (14 Jan 2022 11:04)        MEDICATIONS  (STANDING):  chlorhexidine 4% Liquid 1 Application(s) Topical <User Schedule>  furosemide   Injectable 40 milliGRAM(s) IV Push once  metoprolol succinate ER 12.5 milliGRAM(s) Oral <User Schedule>  pantoprazole    Tablet 40 milliGRAM(s) Oral before breakfast  prednisoLONE acetate 1% Suspension 1 Drop(s) Right EYE daily  simvastatin 20 milliGRAM(s) Oral at bedtime    MEDICATIONS  (PRN):      Allergies  No Known Allergies      Review of Systems:   Constitutional:  No Fever, No Chills  ENT/Mouth:  No Hearing Changes,  No Difficulty Swallowing  Eyes:  No Eye Pain, No Vision Changes  Cardiovascular:  No Chest Pain, No Palpitations  Respiratory:  No Cough, No Dyspnea  Gastrointestinal:  As described in HPI  Musculoskeletal:  No Joint Swelling, No Back Pain  Skin:  No Skin Lesions, No Jaundice  Neuro:  No Syncope, No Dizziness  Heme/Lymph:  No Bruising, No Bleeding.          Physical Examination:  T(C): 36.3 (01-14-22 @ 06:11), Max: 36.7 (01-13-22 @ 20:03)  HR: 62 (01-14-22 @ 08:23) (60 - 65)  BP: 134/62 (01-14-22 @ 08:23) (107/49 - 164/69)  RR: 18 (01-14-22 @ 08:23) (18 - 20)  SpO2: 96% (01-14-22 @ 08:23) (96% - 100%)  Height (cm): 144.8 (01-14-22 @ 01:34)        GENERAL: AAOx3, no acute distress.  HEAD:  Atraumatic, Normocephalic  EYES: conjunctiva and sclera clear  NECK: Supple, no JVD or thyromegaly  CHEST/LUNG: Clear to auscultation bilaterally; No wheeze, rhonchi, or rales  HEART: Regular rate and rhythm; normal S1, S2, No murmurs.  ABDOMEN: Soft, nontender, nondistended; Bowel sounds present. ANA PAULA brown stool  NEUROLOGY: No asterixis or tremor.   SKIN: Intact, no jaundice        Data:                        7.2    5.51  )-----------( 150      ( 14 Jan 2022 04:30 )             25.1     Hgb Trend:  7.2  01-14-22 @ 04:30  6.9  01-13-22 @ 17:17      01-14    139  |  104  |  27<H>  ----------------------------<  83  4.6   |  21  |  0.8    Ca    9.1      14 Jan 2022 04:30  Mg     2.1     01-14    TPro  6.4  /  Alb  3.8  /  TBili  0.5  /  DBili  x   /  AST  17  /  ALT  11  /  AlkPhos  116<H>  01-14    Liver panel trend:  TBili 0.5   /   AST 17   /   ALT 11   /   AlkP 116   /   Tptn 6.4   /   Alb 3.8    /   DBili --      01-14  TBili 0.4   /   AST 30   /   ALT 13   /   AlkP 121   /   Tptn 6.6   /   Alb 4.1    /   DBili --      01-13      PT/INR - ( 13 Jan 2022 17:17 )   PT: 19.50 sec;   INR: 1.70 ratio         PTT - ( 13 Jan 2022 17:17 )  PTT:39.9 sec        Radiology:  CT Angio Abdomen and Pelvis w/ IV Cont:   ACC: [id number] EXAM:  CT ANGIO ABD PELV (W)AW IC                          PROCEDURE DATE:  01/13/2022          INTERPRETATION:  REASON FOR EXAM / CLINICAL STATEMENT: Low hemoglobin   (6.9). Evaluate for GI bleeding.   WBC 5.57  PMH of HLD, CHF, Afibon   eliquis    TECHNIQUE: Contiguous axial CT images were obtained from the lung bases   through the pelvis prior to, and with intravenous contrast in the   arterial and venous phases.  Reformatted images in the coronal and   sagittal planes were acquired, as well as 3DMIP reconstructed images.    COMPARISON CT: CT scan of the abdomen pelvis dated 2/18/2013    OTHER STUDIES USED FOR CORRELATION: None.      FINDINGS:    TUBES/LINES: None    LOWER CHEST: There are mild reticular opacities at the lung bases. No   pleural or pericardial effusion.    HEPATIC: The liver is normal in appearance with no evidence of mass or   intrahepatic bile duct dilatation. The common bile duct is dilated (1.1   cm). Hepatic cyst and hypodensities too small to be further characterized   are noted. The portal vein is patent. The hepatic veins are opacified.    BILIARY: Calcified gallstones are noted.    SPLEEN: Unremarkable.    PANCREAS: The pancreas is normal in size and configuration. No evidence   of mass orpancreatitis.    ADRENAL GLANDS: Unremarkable.    KIDNEYS: No evidence of hydronephrosis, calcified stones, or solid renal   mass. Bilateral renal cysts and subcentimeter hypodensities too small to   be further characterize.    ABDOMINOPELVIC NODES: Unremarkable.    PELVIC ORGANS: No evidence of pelvic mass, lymphadenopathy, or fluid   collection.    BLADDER: Unremarkable    PERITONEUM/MESENTERY/BOWEL: No evidence of active GI bleeding.    Fecal distention of the rectum. No evidence of obstruction, colitis,   inflammatory process, or ascites within the abdomen or pelvis. The   appendix is normal in appearance.    BONES/SOFT TISSUES: Degenerative changes of the spine are noted. There is   a nonobstructing left inguinal hernia containing small bowel loop.    VASCULAR: No evidence of abdominal aortic aneurysm or dissection.  The   celiac axis, the superior mesenteric artery, the inferior mesenteric   artery, and the renal arteries are patent without flow-limiting stenosis.   Single renal arteries are seen bilaterally.        IMPRESSION:  No evidence of active GI bleeding.    --- End of Report ---            QUINTIN CASILLAS MD; Attending Interventional Radiologist  This document has been electronically signed. Jan 13 2022  7:23PM (01-13-22 @ 18:54)      
Date of Admission: 22    Chief Complaint: Patient is a 87y old  Female who presents with a chief complaint of Symptomatic Anemia (2022 21:54)    HISTORY OF PRESENT ILLNESS: 86 F PMH HFpEF, Afib on Eliquis, HTN, and HLD presenting for evaluation of hgb of 6.5. Last known hemoglobin in 2021 was ~12. Denies hematochezia, melena, hematuria. Reports intermittent chest pain, shortness of breath and increased fatigue. Has no history of anemia and has never received a blood transfusion. Last colonoscopy was about 6 years ago which was normal.   In ED bp 141/63, HR 62, afebrile and O2 sat 99% on RA. CTA abdo/pelvis done with no evidence of active GI bleeding. Hg found to be 6.9. 1 U PRBCs ordered.  (2022 21:54)      PAST MEDICAL & SURGICAL HISTORY:  HLD  HFpEF       FAMILY HISTORY:  Mother:  at 99 old age  Father:  at 92 MRSA    SOCIAL HISTORY:    Patient denies smoking, alcohol or drug use    Allergies  No Known Allergies    Intolerances    REVIEW OF SYSTEMS:    CONSTITUTIONAL: No fever, weight loss, or fatigue  CARDIOLOGY: denies chest pain, shortness of breath or syncopal episodes.   RESPIRATORY: denies shortness of breath  NEUROLOGICAL: No weakness, no focal deficits to report.  ENDOCRINOLOGICAL: no recent change in diabetic medications.   GI: no BRBPR, no N,V, diarrhea.    PSYCHIATRY: normal mood and affect  HEENT: no nasal discharge, no ecchymosis  SKIN: no ecchymosis, no breakdown  MUSCULOSKELETAL: Full range of motion x4.     PHYSICAL EXAM:  General Appearance: well appearing, normal for age and gender. 	  Neck: normal JVP, no bruit.   Eyes:  Extra Ocular muscles intact.   Cardiovascular: regular rate and rhythm S1 S2, No JVD, No murmurs, No edema  Respiratory: Lungs clear to auscultation	  Psychiatry: Alert and oriented x 3, Mood & affect appropriate  Gastrointestinal:  Soft, Non-tender  Skin/Integumen: No rashes, No ecchymoses, No cyanosis	  Neurologic: Non-focal  Musculoskeletal/ extremities: Normal range of motion, No clubbing, cyanosis or edema  Vascular: Peripheral pulses palpable 2+ bilaterally      CARDIAC MARKERS:  Serum Pro-Brain Natriuretic Peptide: 669 pg/mL (22 @ 19:55)      PREVIOUS DIAGNOSTIC TESTING:      TTE 21  Summary:   1. LV Ejection Fraction by Pinto's Method with a biplane EF of 65 %.   2. Normal global left ventricular systolic function.   3. Moderate left ventricular hypertrophy.   4. Spectral Doppler shows pseudonormal pattern of left ventricular myocardial filling (Grade II diastolic dysfunction).   5. Severely enlarged left atrium.   6. Severely enlarged right atrium.   7. Sclerotic aortic valve with normal opening.   8. Mild aortic regurgitation.   9. Thickening and calcification of the anterior and posterior mitral leaflets.  10. Mild-to-moderate mitral regurgitation.  11. Moderate tricuspid regurgitation.  12. Estimated pulmonary artery systolic pressure is 36.7 mmHg assuming a right atrial pressure of 15 mmHg, which is consistent with borderline pulmonary hypertension.      Home Medications:    MEDICATIONS  (STANDING):  apixaban 5 milliGRAM(s) Oral two times a day  metoprolol tartrate 25 milliGRAM(s) Oral two times a day  simvastatin 20 milliGRAM(s) Oral at bedtime    MEDICATIONS  (PRN):        
Patient is a 87y old  Female who presents with a chief complaint of Symptomatic Anemia (15 Denzel 2022 10:52)      HPI:  86 F PMH HFpEF, Afib on Eliquis, HTN, and HLD presenting for evaluation of hgb of 6.5. Last known hemoglobin in July 2021 was ~12. Denies hematochezia, melena, hematuria. Reports intermittent chest pain, shortness of breath and increased fatigue. Has no history of anemia and has never received a blood transfusion. Last colonoscopy was about 6 years ago which was normal.     In ED bp 141/63, HR 62, afebrile and O2 sat 99% on RA. CTA abdo/pelvis done with no evidence of active GI bleeding. Hg found to be 6.9. 1 U PRBCs ordered.  (13 Jan 2022 21:54)    Additional History  The patient is very hard of hearing so history also obtained from her daughter at bedside. She reports that the patient has had "dark colored stool" recently and has not been on supplemental iron. She is also on Eliquis for Atrial fibrillation. We reviewed her records and her iron studies show:    Iron with Total Binding Capacity in AM (01.14.22 @ 19:00)    Iron - Total Binding Capacity.: 400 ug/dL    % Saturation, Iron: 6 %    Iron Total, Serum: 26 ug/dL    Unsaturated Iron Binding Capacity: 374: Hemolyzed. Interpret with caution ug/dL  Ferritin, Serum: 48 ng/mL (01.14.22 @ 04:30)    There values were done after transfusion of 1 unit of pRBC. The Ferritin is only 48 with %sat of 6 indicating she is in fact iron deficient. Of note, her hemoglobin 6 months ago in July was 13.  Her last colonoscopy was about 7 years ago.     ROS:  Negative except for:    PAST MEDICAL & SURGICAL HISTORY:  High cholesterol    Kidney stone    Bladder polyp    Shingles  affecting right eye    Atrial flutter    H/O cystoscopy    H/O total knee replacement, right    H/O total knee replacement, left    Cancer of mouth    History of cardioversion        SOCIAL HISTORY:    FAMILY HISTORY:      MEDICATIONS  (STANDING):  chlorhexidine 4% Liquid 1 Application(s) Topical <User Schedule>  furosemide   Injectable 20 milliGRAM(s) IV Push every 12 hours  iron sucrose IVPB 200 milliGRAM(s) IV Intermittent <User Schedule>  metoprolol succinate ER 12.5 milliGRAM(s) Oral <User Schedule>  pantoprazole    Tablet 40 milliGRAM(s) Oral before breakfast  prednisoLONE acetate 1% Suspension 1 Drop(s) Right EYE daily  simvastatin 20 milliGRAM(s) Oral at bedtime    MEDICATIONS  (PRN):      Allergies    No Known Allergies    Intolerances        Vital Signs Last 24 Hrs  T(C): 36.3 (15 Denzel 2022 05:30), Max: 36.5 (14 Jan 2022 13:32)  T(F): 97.3 (15 Denzel 2022 05:30), Max: 97.7 (14 Jan 2022 13:32)  HR: 67 (15 Denzel 2022 05:30) (61 - 89)  BP: 136/66 (15 Denzel 2022 05:30) (107/52 - 136/66)  BP(mean): --  RR: 18 (15 Denzel 2022 09:13) (18 - 18)  SpO2: 97% (15 Denzel 2022 09:13) (97% - 97%)    PHYSICAL EXAM  General: Well appearing female in NAD  Neuro: alert and oriented X 4 though hard of hearing  Rest of examination is limited due to COVID 19       LABS:                          8.5    5.32  )-----------( 144      ( 15 Denzel 2022 06:19 )             28.6         Mean Cell Volume : 84.1 fL  Mean Cell Hemoglobin : 25.0 pg  Mean Cell Hemoglobin Concentration : 29.7 g/dL  Auto Neutrophil # : 2.94 K/uL  Auto Lymphocyte # : 1.56 K/uL  Auto Monocyte # : 0.64 K/uL  Auto Eosinophil # : 0.10 K/uL  Auto Basophil # : 0.06 K/uL  Auto Neutrophil % : 55.3 %  Auto Lymphocyte % : 29.3 %  Auto Monocyte % : 12.0 %  Auto Eosinophil % : 1.9 %  Auto Basophil % : 1.1 %      Serial CBC's  01-15 @ 08:42  Hct--- / Hgb--- / Plat--- / RBC-3.39 / WBC---  Serial CBC's  01-15 @ 06:19  Hct-28.6 / Hgb-8.5 / Plat-144 / RBC-3.40 / WBC-5.32  Serial CBC's  01-14 @ 19:00  Hct-23.9 / Hgb-6.8 / Plat-155 / RBC-2.84 / WBC-4.54  Serial CBC's  01-14 @ 04:30  Hct-25.1 / Hgb-7.2 / Plat-150 / RBC-3.00 / WBC-5.51  Serial CBC's  01-13 @ 17:17  Hct-23.3 / Hgb-6.9 / Plat-162 / RBC-2.83 / WBC-5.57      01-15    136  |  96<L>  |  23<H>  ----------------------------<  88  4.0   |  25  |  1.0    Ca    9.2      15 Denzel 2022 06:19  Mg     2.0     01-15    TPro  6.5  /  Alb  4.0  /  TBili  0.8  /  DBili  x   /  AST  20  /  ALT  11  /  AlkPhos  118<H>  01-15      PT/INR - ( 13 Jan 2022 17:17 )   PT: 19.50 sec;   INR: 1.70 ratio         PTT - ( 13 Jan 2022 17:17 )  PTT:39.9 sec    Reticulocyte Percent: 2.3 % (01-15 @ 08:42)  Iron - Total Binding Capacity.: 400 ug/dL (01-14 @ 19:00)  Ferritin, Serum: 48 ng/mL (01-14 @ 04:30)  Folate, Serum: 15.8 ng/mL (01-14 @ 04:30)  Vitamin B12, Serum: 515 pg/mL (01-14 @ 04:30)              BLOOD SMEAR INTERPRETATION:       RADIOLOGY & ADDITIONAL STUDIES:

## 2022-01-15 NOTE — PROGRESS NOTE ADULT - ASSESSMENT
85 yo F PMHx chronic HFpEF, chronic aflutter on Eliquis, HTN, DLD who presented for evaluation of anemia. Was feeling dizzy and weak x 1 week. She had routine blood work and was found to have anemia. She was instructed to come to the hospital for further evaluation.     Acute normocytic anemia  - no evidence of GI bleed  - discussed with GI team-no plan for inpatient EGD or colonoscopy, can resume Eliquis  - s/p 2 U PRBC  - no active bleeding on CTA abdomen and pelvis  - pt with iron deficiency-spoke with hematology--start venofer  - dropped hgb again yesterday, Eliquis held  - case d/w GI- if hgb drops again will plan for endoscopy    CBD dilation  - CBD 1.1 cm  - hepatic cysts  - no evidence of stones  - outpt GI    Left inguinal hernia  - non obstructing  - outpt monitoring      Chronic Aflutter  - rate controlled  - restart Eliquis  - c/w metoprolol    HLD  - c/w simvastatin    HFpEF  - worsening LE edema  - currently on IV lasix  - awaiting eval from Dr. Whiteside    DVT px-Eliquis  From home  c/w telemetry until diuretic plan clear    #Progress Note Handoff:  Pending (specify):  monitoring hgb   Family discussion: discussed above with pt and son and daughter at bedside  Disposition: Home__x_/SNF___/Other________/Unknown at this time________  
87 yo F PMHx chronic HFpEF, chronic aflutter on Eliquis, HTN, DLD who presented for evaluation of anemia. Was feeling dizzy and weak x 1 week. She had routine blood work and was found to have anemia. She was instructed to come to the hospital for further evaluation.     Acute normocytic anemia  - no evidence of GI bleed  - discussed with GI team-no plan for inpatient EGD or colonoscopy, can resume Eliquis  - s/p 1 U prbc  - no active bleeding on CTA abdomen and pelvis  - pending iron studies, folate, b12  - transfuse one more unit, hgb goal >8 given history of HF    CBD dilation  - CBD 1.1 cm  - hepatic cysts  - no evidence of stones  - outpt GI    Left inguinal hernia  - non obstructing  - outpt monitoring      Chronic Aflutter  - rate controlled  - restart Eliquis  - c/w metoprolol    HLD  - c/w simvastatin    HFpEF  - worsening LE edema  - currently on IV lasix  - awaiting eval from Dr. Whiteside    DVT px-Eliquis  From home  c/w telemetry until diuretic plan clear    #Progress Note Handoff:  Pending (specify):  monitor overnight for worsening anemia, restart eliquis, HF eval, c/w IV lasix  Family discussion: discussed above with pt and daughter at bedside  Disposition: Home__x_/SNF___/Other________/Unknown at this time________

## 2022-01-16 ENCOUNTER — TRANSCRIPTION ENCOUNTER (OUTPATIENT)
Age: 87
End: 2022-01-16

## 2022-01-16 VITALS
HEART RATE: 65 BPM | OXYGEN SATURATION: 99 % | WEIGHT: 169.98 LBS | TEMPERATURE: 97 F | SYSTOLIC BLOOD PRESSURE: 129 MMHG | RESPIRATION RATE: 17 BRPM | DIASTOLIC BLOOD PRESSURE: 62 MMHG

## 2022-01-16 LAB
ALBUMIN SERPL ELPH-MCNC: 3.4 G/DL — LOW (ref 3.5–5.2)
ALP SERPL-CCNC: 104 U/L — SIGNIFICANT CHANGE UP (ref 30–115)
ALT FLD-CCNC: 9 U/L — SIGNIFICANT CHANGE UP (ref 0–41)
ANION GAP SERPL CALC-SCNC: 15 MMOL/L — HIGH (ref 7–14)
AST SERPL-CCNC: 17 U/L — SIGNIFICANT CHANGE UP (ref 0–41)
BASOPHILS # BLD AUTO: 0.03 K/UL — SIGNIFICANT CHANGE UP (ref 0–0.2)
BASOPHILS NFR BLD AUTO: 0.7 % — SIGNIFICANT CHANGE UP (ref 0–1)
BILIRUB SERPL-MCNC: 0.8 MG/DL — SIGNIFICANT CHANGE UP (ref 0.2–1.2)
BUN SERPL-MCNC: 21 MG/DL — HIGH (ref 10–20)
CALCIUM SERPL-MCNC: 8.6 MG/DL — SIGNIFICANT CHANGE UP (ref 8.5–10.1)
CHLORIDE SERPL-SCNC: 97 MMOL/L — LOW (ref 98–110)
CO2 SERPL-SCNC: 24 MMOL/L — SIGNIFICANT CHANGE UP (ref 17–32)
CREAT SERPL-MCNC: 0.8 MG/DL — SIGNIFICANT CHANGE UP (ref 0.7–1.5)
EOSINOPHIL # BLD AUTO: 0.08 K/UL — SIGNIFICANT CHANGE UP (ref 0–0.7)
EOSINOPHIL NFR BLD AUTO: 1.8 % — SIGNIFICANT CHANGE UP (ref 0–8)
GLUCOSE SERPL-MCNC: 86 MG/DL — SIGNIFICANT CHANGE UP (ref 70–99)
HCT VFR BLD CALC: 27.4 % — LOW (ref 37–47)
HGB BLD-MCNC: 8.3 G/DL — LOW (ref 12–16)
IMM GRANULOCYTES NFR BLD AUTO: 0.2 % — SIGNIFICANT CHANGE UP (ref 0.1–0.3)
LYMPHOCYTES # BLD AUTO: 0.78 K/UL — LOW (ref 1.2–3.4)
LYMPHOCYTES # BLD AUTO: 17.8 % — LOW (ref 20.5–51.1)
MAGNESIUM SERPL-MCNC: 1.8 MG/DL — SIGNIFICANT CHANGE UP (ref 1.8–2.4)
MCHC RBC-ENTMCNC: 24.9 PG — LOW (ref 27–31)
MCHC RBC-ENTMCNC: 30.3 G/DL — LOW (ref 32–37)
MCV RBC AUTO: 82 FL — SIGNIFICANT CHANGE UP (ref 81–99)
MONOCYTES # BLD AUTO: 0.42 K/UL — SIGNIFICANT CHANGE UP (ref 0.1–0.6)
MONOCYTES NFR BLD AUTO: 9.6 % — HIGH (ref 1.7–9.3)
NEUTROPHILS # BLD AUTO: 3.06 K/UL — SIGNIFICANT CHANGE UP (ref 1.4–6.5)
NEUTROPHILS NFR BLD AUTO: 69.9 % — SIGNIFICANT CHANGE UP (ref 42.2–75.2)
NRBC # BLD: 0 /100 WBCS — SIGNIFICANT CHANGE UP (ref 0–0)
PLATELET # BLD AUTO: 130 K/UL — SIGNIFICANT CHANGE UP (ref 130–400)
POTASSIUM SERPL-MCNC: 3.8 MMOL/L — SIGNIFICANT CHANGE UP (ref 3.5–5)
POTASSIUM SERPL-SCNC: 3.8 MMOL/L — SIGNIFICANT CHANGE UP (ref 3.5–5)
PROT SERPL-MCNC: 5.7 G/DL — LOW (ref 6–8)
RBC # BLD: 3.34 M/UL — LOW (ref 4.2–5.4)
RBC # FLD: 18.4 % — HIGH (ref 11.5–14.5)
SODIUM SERPL-SCNC: 136 MMOL/L — SIGNIFICANT CHANGE UP (ref 135–146)
WBC # BLD: 4.38 K/UL — LOW (ref 4.8–10.8)
WBC # FLD AUTO: 4.38 K/UL — LOW (ref 4.8–10.8)

## 2022-01-16 PROCEDURE — 99239 HOSP IP/OBS DSCHRG MGMT >30: CPT

## 2022-01-16 PROCEDURE — 83020 HEMOGLOBIN ELECTROPHORESIS: CPT | Mod: 26

## 2022-01-16 RX ORDER — PANTOPRAZOLE SODIUM 20 MG/1
1 TABLET, DELAYED RELEASE ORAL
Qty: 60 | Refills: 0
Start: 2022-01-16 | End: 2022-02-14

## 2022-01-16 RX ADMIN — Medication 12.5 MILLIGRAM(S): at 06:23

## 2022-01-16 RX ADMIN — IRON SUCROSE 110 MILLIGRAM(S): 20 INJECTION, SOLUTION INTRAVENOUS at 11:15

## 2022-01-16 RX ADMIN — PANTOPRAZOLE SODIUM 40 MILLIGRAM(S): 20 TABLET, DELAYED RELEASE ORAL at 06:23

## 2022-01-16 RX ADMIN — Medication 1 DROP(S): at 11:16

## 2022-01-16 NOTE — DISCHARGE NOTE NURSING/CASE MANAGEMENT/SOCIAL WORK - PATIENT PORTAL LINK FT
You can access the FollowMyHealth Patient Portal offered by [facility] by registering at the following website: [email or web address]. By joining [employer]’s FollowMyHealth portal, you will also be able to view your health information using other applications (apps) compatible with our system.

## 2022-01-16 NOTE — DISCHARGE NOTE PROVIDER - CARE PROVIDERS DIRECT ADDRESSES
[email or web address]

## 2022-01-16 NOTE — DISCHARGE NOTE PROVIDER - PROVIDER TOKENS
PROVIDER:[TOKEN:[[id number]]],PROVIDER:[TOKEN:[[id number]]],PROVIDER:[TOKEN:[[id number]]]

## 2022-01-16 NOTE — DISCHARGE NOTE PROVIDER - ATTENDING DISCHARGE PHYSICAL EXAMINATION:
PHYSICAL EXAM:  GENERAL: NAD, speaks in full sentences, no signs of respiratory distress  HEAD:  Atraumatic, Normocephalic  EYES: EOMI, PERRLA, conjunctiva and sclera clear  NECK: Supple, No JVD  CHEST/LUNG: Clear to auscultation bilaterally; No wheeze; No crackles; No accessory muscles used  HEART: Regular rate and rhythm; No murmurs;   ABDOMEN: Soft, Nontender, Nondistended; Bowel sounds present; No guarding  EXTREMITIES:  2+ Peripheral Pulses, No cyanosis or edema  PSYCH: AAOx3  NEUROLOGY: non-focal  SKIN: No rashes or lesions, scar on knee

## 2022-01-16 NOTE — DISCHARGE NOTE PROVIDER - HOSPITAL COURSE
87 yo F PMHx chronic HFpEF, chronic aflutter on Eliquis, HTN, DLD who presented for evaluation of anemia. Was feeling dizzy and weak x 1 week. She had routine blood work and was found to have anemia. She was instructed to come to the hospital for further evaluation.  Hgb stable after 3 U PRBC. Case d/w GI, no plan for scope, okay to restart Eliquis. Family does not want to monitor on Eliquis while in hospital, agreeable to restart at home, aware of risk of rebleeding. Does not want to take PO iron due to constipation, will follow up with [facility] for venofer.

## 2022-01-16 NOTE — DISCHARGE NOTE NURSING/CASE MANAGEMENT/SOCIAL WORK - NSDCPEFALRISK_GEN_ALL_CORE
For information on Fall & Injury Prevention, visit: [email or web address] OR  [email or web address] OR  https://www.cdc.gov/steadi/patient.html

## 2022-01-16 NOTE — DISCHARGE NOTE PROVIDER - NSDCCPCAREPLAN_GEN_ALL_CORE_FT
PRINCIPAL DISCHARGE DIAGNOSIS  Diagnosis: Symptomatic anemia  Assessment and Plan of Treatment: Suspected due to an intermittent GI bleed. YOu received a blood transfusion and your hemoglobin remained stable. You may restart your Eliquis. If you have black, maroon, or red stool please return to hospital as this suggests a GI bleed and stop your Eliquis. Take protonix twice daily and follow up with your PMD. You may go to the [facility] for iron infusions.       PRINCIPAL DISCHARGE DIAGNOSIS  Diagnosis: Symptomatic anemia  Assessment and Plan of Treatment: Suspected due to an intermittent GI bleed. YOu received a blood transfusion and your hemoglobin remained stable. You may hold your Eliquis as there is still uncertaininty of cause of anemia and you area aware there is a risk of stroke given history of Afib/flutter. . If you have black, maroon, or red stool please return to hospital as this suggests a GI bleed. Take protonix twice daily and follow up with your PMD. You may go to the [facility] for iron infusions.

## 2022-01-16 NOTE — DISCHARGE NOTE PROVIDER - CARE PROVIDER_API CALL
Neisha Max)  Gastroenterology; Internal Medicine  [address] [place] [address]  Phone: [phone]  Fax: [phone]  Follow Up Time:     Dino Payne)  Hematology; Internal Medicine; Medical Oncology  [address] [place] [address]  Phone: [phone]  Fax: [phone]  Follow Up Time:     Andrew Quan; MD)  Adv Heart Fail Trnsplnt Cardio; Cardiovascular Disease; Internal Medicine  [address] [place] [address], [address] [place] [address]  Phone: [phone]  Fax: [phone]  Follow Up Time:

## 2022-01-16 NOTE — DISCHARGE NOTE PROVIDER - NSDCMRMEDTOKEN_GEN_ALL_CORE_FT
apixaban 5 mg oral tablet: 1 tab(s) orally 2 times a day  Lasix 20 mg oral tablet: 1 tab(s) orally 2 times a week then the following week take it 3 times per week  metoprolol succinate 25 mg oral tablet, extended release: 0.5 tab(s) orally Tuesday, Wednesday, Thursday, Saturday, Sunday  pantoprazole 40 mg oral delayed release tablet: 1 tab(s) orally 2 times a day   prednisoLONE acetate 1% ophthalmic suspension: 1 drop(s) in the right eye once a day  simvastatin 20 mg oral tablet: 1 tab(s) orally once a day (at bedtime)   Lasix 20 mg oral tablet: 1 tab(s) orally 2 times a week then the following week take it 3 times per week  metoprolol succinate 25 mg oral tablet, extended release: 0.5 tab(s) orally Tuesday, Wednesday, Thursday, Saturday, Sunday  pantoprazole 40 mg oral delayed release tablet: 1 tab(s) orally 2 times a day   prednisoLONE acetate 1% ophthalmic suspension: 1 drop(s) in the right eye once a day  simvastatin 20 mg oral tablet: 1 tab(s) orally once a day (at bedtime)

## 2022-01-18 LAB
HEMOGLOBIN INTERPRETATION: SIGNIFICANT CHANGE UP
HGB A MFR BLD: 98 % — SIGNIFICANT CHANGE UP (ref 95.8–98)
HGB A2 MFR BLD: 2 % — SIGNIFICANT CHANGE UP (ref 2–3.2)

## 2022-01-20 DIAGNOSIS — D64.9 ANEMIA, UNSPECIFIED: ICD-10-CM

## 2022-01-20 DIAGNOSIS — I48.20 CHRONIC ATRIAL FIBRILLATION, UNSPECIFIED: ICD-10-CM

## 2022-01-20 DIAGNOSIS — E87.70 FLUID OVERLOAD, UNSPECIFIED: ICD-10-CM

## 2022-01-20 DIAGNOSIS — E78.5 HYPERLIPIDEMIA, UNSPECIFIED: ICD-10-CM

## 2022-01-20 DIAGNOSIS — Z79.01 LONG TERM (CURRENT) USE OF ANTICOAGULANTS: ICD-10-CM

## 2022-01-20 DIAGNOSIS — I11.0 HYPERTENSIVE HEART DISEASE WITH HEART FAILURE: ICD-10-CM

## 2022-01-20 DIAGNOSIS — I50.33 ACUTE ON CHRONIC DIASTOLIC (CONGESTIVE) HEART FAILURE: ICD-10-CM

## 2022-01-20 DIAGNOSIS — K40.90 UNILATERAL INGUINAL HERNIA, WITHOUT OBSTRUCTION OR GANGRENE, NOT SPECIFIED AS RECURRENT: ICD-10-CM

## 2022-01-20 DIAGNOSIS — I48.92 UNSPECIFIED ATRIAL FLUTTER: ICD-10-CM

## 2022-01-20 DIAGNOSIS — K83.8 OTHER SPECIFIED DISEASES OF BILIARY TRACT: ICD-10-CM

## 2022-01-20 DIAGNOSIS — D50.9 IRON DEFICIENCY ANEMIA, UNSPECIFIED: ICD-10-CM

## 2022-01-24 ENCOUNTER — RX RENEWAL (OUTPATIENT)
Age: 87
End: 2022-01-24

## 2022-01-25 ENCOUNTER — APPOINTMENT (OUTPATIENT)
Dept: HEMATOLOGY ONCOLOGY | Facility: CLINIC | Age: 87
End: 2022-01-25
Payer: MEDICARE

## 2022-01-25 PROCEDURE — 99214 OFFICE O/P EST MOD 30 MIN: CPT

## 2022-01-25 RX ORDER — APIXABAN 5 MG/1
5 TABLET, FILM COATED ORAL
Qty: 3 | Refills: 3 | Status: COMPLETED | COMMUNITY
End: 2022-01-25

## 2022-01-25 NOTE — REVIEW OF SYSTEMS
[Fatigue] : fatigue [Lower Ext Edema] : lower extremity edema [Skin Rash] : skin rash [Negative] : Allergic/Immunologic

## 2022-01-25 NOTE — PHYSICAL EXAM
[Restricted in physically strenuous activity but ambulatory and able to carry out work of a light or sedentary nature] : Status 1- Restricted in physically strenuous activity but ambulatory and able to carry out work of a light or sedentary nature, e.g., light house work, office work [Normal] : affect appropriate [de-identified] : She has a resolving hematoma from IV infiltration  during PRBC transfusion in right arm that is still swollen

## 2022-01-25 NOTE — ASSESSMENT
[FreeTextEntry1] : #Iron deficiency anemia likely secondary to gastrointestinal bleeding given patient has been having black stools and also is on anticoagulation\par -s/p  Venofer 200 mg IV once  in 1/15/22, will administer 3 more weekly doses and repeat CBC weekly and iron stores with retic with dose 3 and will  proceed from there\par -explained if normal iron studies in future and anemia remains she may need a bone marrow biopsy  \par -seen by GI and will not be undergoing endoscopic evaluation ,  is on PPI BID\par -apixaban right now remains on hold. If CBC is stable in the future and no further black stools  it will be her preference if she wishes to restart Apixaban (  Risk of bleeding vs risk of CVA prevention) \par -will arrange for Telehealth in one month

## 2022-01-25 NOTE — HISTORY OF PRESENT ILLNESS
[de-identified] : This is a 86 F PMH HFpEF, Afib on Eliquis, HTN, and HLD presentted to ED in 1/2022  for evaluation of hgb\par of 6.5. Last known hemoglobin in July 2021 was ~12. Denies hematochezia, melena, hematuria. Reports intermittent chest pain, shortness of breath and\par increased fatigue. Has no history of anemia and has never received a blood transfusion. Last colonoscopy was about 6 years ago which was normal.\par \par In ED bp 141/63, HR 62, afebrile and O2 sat 99% on RA. CTA abdo/pelvis done with no evidence of active GI bleeding. Hg found to be 6.9. 1 U PRBCs ordered.\par (13 Jan 2022 21:54)\par \par Additional History\par The patient is very hard of hearing so history also obtained from her daughter at bedside. She reports that the patient has had "dark colored stool" recently\par and has not been on supplemental iron. She is also on Eliquis for Atrial fibrillation. We reviewed her records and her iron studies show:\par \par Iron with Total Binding Capacity in AM (01.14.22 @ 19:00)\par  Iron - Total Binding Capacity.: 400 ug/dL\par  % Saturation, Iron: 6 %\par  Iron Total, Serum: 26 ug/dL\par  Unsaturated Iron Binding Capacity: 374: Hemolyzed. Interpret with caution\par ug/dL\par Ferritin, Serum: 48 ng/mL (01.14.22 @ 04:30)\par \par There values were done after transfusion of 1 unit of pRBC. The Ferritin is\par only 48 with %sat of 6 indicating she is in fact iron deficient. Of note, her\par hemoglobin 6 months ago in July was 13. Her last colonoscopy was about 6-7 years ago.\par \par Additional studies were also done that showed Normal HgB electophoresis, Haptoglobin was 92,  LDH was 186 Retic 2.9 with abs 78.3\par \par She had a dose of Venofer 200 mg IV once as well.  \par \par Apixian was held\par \par CBC on 1/16/22 on discharge  showed hgb of 8.3\par \par B12 and folate was normal in hospital\par \par \par Imaging:\par 1/13/22 CT abdomen: No evinced of GI bleeding\par  [de-identified] : 1/25/22\par She is here for follow up with her son. She saw GI yesterday and given her age it was felt that she would not benefit from endoscopic procedures. She states her stool is normal now. She [facility] blood work [facility] 1[facility]22 CMP normal, CBC  WBC 5.5, Hgb 9.2, Plts 157,  MCV is 84

## 2022-02-04 ENCOUNTER — APPOINTMENT (OUTPATIENT)
Dept: GASTROENTEROLOGY | Facility: CLINIC | Age: 87
End: 2022-02-04

## 2022-02-04 ENCOUNTER — APPOINTMENT (OUTPATIENT)
Dept: INFUSION THERAPY | Facility: CLINIC | Age: 87
End: 2022-02-04

## 2022-02-04 RX ORDER — IRON SUCROSE 20 MG/ML
200 INJECTION, SOLUTION INTRAVENOUS ONCE
Refills: 0 | Status: COMPLETED | OUTPATIENT
Start: 2022-02-04 | End: 2022-02-04

## 2022-02-04 RX ADMIN — IRON SUCROSE 220 MILLIGRAM(S): 20 INJECTION, SOLUTION INTRAVENOUS at 12:20

## 2022-02-09 ENCOUNTER — APPOINTMENT (OUTPATIENT)
Dept: CARDIOLOGY | Facility: CLINIC | Age: 87
End: 2022-02-09
Payer: MEDICARE

## 2022-02-09 VITALS
DIASTOLIC BLOOD PRESSURE: 78 MMHG | SYSTOLIC BLOOD PRESSURE: 128 MMHG | TEMPERATURE: 97.2 F | BODY MASS INDEX: 35.81 KG/M2 | OXYGEN SATURATION: 98 % | HEART RATE: 66 BPM | HEIGHT: 58 IN | WEIGHT: 170.6 LBS

## 2022-02-09 PROCEDURE — 93000 ELECTROCARDIOGRAM COMPLETE: CPT

## 2022-02-09 PROCEDURE — 99215 OFFICE O/P EST HI 40 MIN: CPT

## 2022-02-09 RX ORDER — FUROSEMIDE 20 MG/1
20 TABLET ORAL
Qty: 30 | Refills: 0 | Status: DISCONTINUED | COMMUNITY
End: 2022-02-09

## 2022-02-11 ENCOUNTER — APPOINTMENT (OUTPATIENT)
Dept: INFUSION THERAPY | Facility: CLINIC | Age: 87
End: 2022-02-11

## 2022-02-11 ENCOUNTER — LABORATORY RESULT (OUTPATIENT)
Age: 87
End: 2022-02-11

## 2022-02-11 RX ORDER — IRON SUCROSE 20 MG/ML
200 INJECTION, SOLUTION INTRAVENOUS ONCE
Refills: 0 | Status: COMPLETED | OUTPATIENT
Start: 2022-02-11 | End: 2022-02-11

## 2022-02-11 RX ADMIN — IRON SUCROSE 220 MILLIGRAM(S): 20 INJECTION, SOLUTION INTRAVENOUS at 11:44

## 2022-02-14 LAB
HCT VFR BLD CALC: 36.8 %
HGB BLD-MCNC: 11.1 G/DL
MCHC RBC-ENTMCNC: 26.2 PG
MCHC RBC-ENTMCNC: 30.2 G/DL
MCV RBC AUTO: 86.8 FL
PLATELET # BLD AUTO: 120 K/UL
PMV BLD: NORMAL
RBC # BLD: 4.24 M/UL
RBC # FLD: 22.8 %
WBC # FLD AUTO: 6.82 K/UL

## 2022-02-18 ENCOUNTER — LABORATORY RESULT (OUTPATIENT)
Age: 87
End: 2022-02-18

## 2022-02-18 ENCOUNTER — APPOINTMENT (OUTPATIENT)
Dept: INFUSION THERAPY | Facility: CLINIC | Age: 87
End: 2022-02-18

## 2022-02-18 LAB
HCT VFR BLD CALC: 34.2 %
HGB BLD-MCNC: 10.8 G/DL
IRON SATN MFR SERPL: 21 %
IRON SERPL-MCNC: 62 UG/DL
MCHC RBC-ENTMCNC: 27.3 PG
MCHC RBC-ENTMCNC: 31.6 G/DL
MCV RBC AUTO: 86.4 FL
PLATELET # BLD AUTO: 116 K/UL
PMV BLD: 11.7 FL
RBC # BLD: 3.96 M/UL
RBC # FLD: 23.6 %
RETICS # AUTO: 0.9 %
RETICS AGGREG/RBC NFR: 35.6 K/UL
TIBC SERPL-MCNC: 296 UG/DL
UIBC SERPL-MCNC: 234 UG/DL
WBC # FLD AUTO: 3.89 K/UL

## 2022-02-18 RX ORDER — IRON SUCROSE 20 MG/ML
200 INJECTION, SOLUTION INTRAVENOUS ONCE
Refills: 0 | Status: COMPLETED | OUTPATIENT
Start: 2022-02-18 | End: 2022-02-18

## 2022-02-18 RX ADMIN — IRON SUCROSE 110 MILLIGRAM(S): 20 INJECTION, SOLUTION INTRAVENOUS at 11:41

## 2022-02-19 ENCOUNTER — TRANSCRIPTION ENCOUNTER (OUTPATIENT)
Age: 87
End: 2022-02-19

## 2022-02-20 LAB — FERRITIN SERPL-MCNC: 262 NG/ML

## 2022-02-25 NOTE — PHYSICAL EXAM
[Restricted in physically strenuous activity but ambulatory and able to carry out work of a light or sedentary nature] : Status 1- Restricted in physically strenuous activity but ambulatory and able to carry out work of a light or sedentary nature, e.g., light house work, office work [Normal] : affect appropriate [de-identified] : She has a resolving hematoma from IV infiltration  during PRBC transfusion in right arm that is still swollen

## 2022-02-25 NOTE — HISTORY OF PRESENT ILLNESS
[de-identified] : This is a 86 F PMH HFpEF, Afib on Eliquis, HTN, and HLD presentted to ED in 1/2022  for evaluation of hgb\par of 6.5. Last known hemoglobin in July 2021 was ~12. Denies hematochezia, melena, hematuria. Reports intermittent chest pain, shortness of breath and\par increased fatigue. Has no history of anemia and has never received a blood transfusion. Last colonoscopy was about 6 years ago which was normal.\par \par In ED bp 141/63, HR 62, afebrile and O2 sat 99% on RA. CTA abdo/pelvis done with no evidence of active GI bleeding. Hg found to be 6.9. 1 U PRBCs ordered.\par (13 Jan 2022 21:54)\par \par Additional History\par The patient is very hard of hearing so history also obtained from her daughter at bedside. She reports that the patient has had "dark colored stool" recently\par and has not been on supplemental iron. She is also on Eliquis for Atrial fibrillation. We reviewed her records and her iron studies show:\par \par Iron with Total Binding Capacity in AM (01.14.22 @ 19:00)\par  Iron - Total Binding Capacity.: 400 ug/dL\par  % Saturation, Iron: 6 %\par  Iron Total, Serum: 26 ug/dL\par  Unsaturated Iron Binding Capacity: 374: Hemolyzed. Interpret with caution\par ug/dL\par Ferritin, Serum: 48 ng/mL (01.14.22 @ 04:30)\par \par There values were done after transfusion of 1 unit of pRBC. The Ferritin is\par only 48 with %sat of 6 indicating she is in fact iron deficient. Of note, her\par hemoglobin 6 months ago in July was 13. Her last colonoscopy was about 6-7 years ago.\par \par Additional studies were also done that showed Normal HgB electophoresis, Haptoglobin was 92,  LDH was 186 Retic 2.9 with abs 78.3\par \par She had a dose of Venofer 200 mg IV once as well.  \par \par Apixian was held\par \par CBC on 1/16/22 on discharge  showed hgb of 8.3\par \par B12 and folate was normal in hospital\par \par \par Imaging:\par 1/13/22 CT abdomen: No evinced of GI bleeding\par  [de-identified] : 1/25/22\par She is here for follow up with her son. She saw GI yesterday and given her age it was felt that she would not benefit from endoscopic procedures. She states her stool is normal now. She [facility] blood work [facility] 1[facility]22 CMP normal, CBC  WBC 5.5, Hgb 9.2, Plts 157,  MCV is 84

## 2022-02-25 NOTE — PHYSICAL EXAM
[Restricted in physically strenuous activity but ambulatory and able to carry out work of a light or sedentary nature] : Status 1- Restricted in physically strenuous activity but ambulatory and able to carry out work of a light or sedentary nature, e.g., light house work, office work [Normal] : affect appropriate [de-identified] : She has a resolving hematoma from IV infiltration  during PRBC transfusion in right arm that is still swollen

## 2022-02-25 NOTE — HISTORY OF PRESENT ILLNESS
[de-identified] : This is a 86 F PMH HFpEF, Afib on Eliquis, HTN, and HLD presentted to ED in 1/2022  for evaluation of hgb\par of 6.5. Last known hemoglobin in July 2021 was ~12. Denies hematochezia, melena, hematuria. Reports intermittent chest pain, shortness of breath and\par increased fatigue. Has no history of anemia and has never received a blood transfusion. Last colonoscopy was about 6 years ago which was normal.\par \par In ED bp 141/63, HR 62, afebrile and O2 sat 99% on RA. CTA abdo/pelvis done with no evidence of active GI bleeding. Hg found to be 6.9. 1 U PRBCs ordered.\par (13 Jan 2022 21:54)\par \par Additional History\par The patient is very hard of hearing so history also obtained from her daughter at bedside. She reports that the patient has had "dark colored stool" recently\par and has not been on supplemental iron. She is also on Eliquis for Atrial fibrillation. We reviewed her records and her iron studies show:\par \par Iron with Total Binding Capacity in AM (01.14.22 @ 19:00)\par  Iron - Total Binding Capacity.: 400 ug/dL\par  % Saturation, Iron: 6 %\par  Iron Total, Serum: 26 ug/dL\par  Unsaturated Iron Binding Capacity: 374: Hemolyzed. Interpret with caution\par ug/dL\par Ferritin, Serum: 48 ng/mL (01.14.22 @ 04:30)\par \par There values were done after transfusion of 1 unit of pRBC. The Ferritin is\par only 48 with %sat of 6 indicating she is in fact iron deficient. Of note, her\par hemoglobin 6 months ago in July was 13. Her last colonoscopy was about 6-7 years ago.\par \par Additional studies were also done that showed Normal HgB electophoresis, Haptoglobin was 92,  LDH was 186 Retic 2.9 with abs 78.3\par \par She had a dose of Venofer 200 mg IV once as well.  \par \par Apixian was held\par \par CBC on 1/16/22 on discharge  showed hgb of 8.3\par \par B12 and folate was normal in hospital\par \par \par Imaging:\par 1/13/22 CT abdomen: No evinced of GI bleeding\par  [de-identified] : 1/25/22\par She is here for follow up with her son. She saw GI yesterday and given her age it was felt that she would not benefit from endoscopic procedures. She states her stool is normal now. She [facility] blood work [facility] 1[facility]22 CMP normal, CBC  WBC 5.5, Hgb 9.2, Plts 157,  MCV is 84

## 2022-03-04 ENCOUNTER — OUTPATIENT (OUTPATIENT)
Dept: OUTPATIENT SERVICES | Facility: HOSPITAL | Age: 87
LOS: 1 days | Discharge: HOME | End: 2022-03-04

## 2022-03-04 ENCOUNTER — APPOINTMENT (OUTPATIENT)
Dept: HEMATOLOGY ONCOLOGY | Facility: CLINIC | Age: 87
End: 2022-03-04
Payer: MEDICARE

## 2022-03-04 DIAGNOSIS — Z98.890 OTHER SPECIFIED POSTPROCEDURAL STATES: Chronic | ICD-10-CM

## 2022-03-04 DIAGNOSIS — Z96.652 PRESENCE OF LEFT ARTIFICIAL KNEE JOINT: Chronic | ICD-10-CM

## 2022-03-04 DIAGNOSIS — Z96.651 PRESENCE OF RIGHT ARTIFICIAL KNEE JOINT: Chronic | ICD-10-CM

## 2022-03-04 DIAGNOSIS — D50.9 IRON DEFICIENCY ANEMIA, UNSPECIFIED: ICD-10-CM

## 2022-03-04 DIAGNOSIS — C06.9 MALIGNANT NEOPLASM OF MOUTH, UNSPECIFIED: Chronic | ICD-10-CM

## 2022-03-04 PROCEDURE — 99442: CPT | Mod: 95

## 2022-03-04 NOTE — HISTORY OF PRESENT ILLNESS
[Home] : at home, [unfilled] , at the time of the visit. [Medical Office: ([facility])___] : at the medical office located in  [Family Member] : family member [Verbal consent obtained from patient] : the patient, [unfilled] [FreeTextEntry3] : Daughter  [de-identified] : This is a 86 F PMH HFpEF, Afib on Eliquis, HTN, and HLD presentted to ED in 1/2022  for evaluation of hgb\par of 6.5. Last known hemoglobin in July 2021 was ~12. Denies hematochezia, melena, hematuria. Reports intermittent chest pain, shortness of breath and\par increased fatigue. Has no history of anemia and has never received a blood transfusion. Last colonoscopy was about 6 years ago which was normal.\par \par In ED bp 141/63, HR 62, afebrile and O2 sat 99% on RA. CTA abdo/pelvis done with no evidence of active GI bleeding. Hg found to be 6.9. 1 U PRBCs ordered.\par (13 Jan 2022 21:54)\par \par Additional History\par The patient is very hard of hearing so history also obtained from her daughter at bedside. She reports that the patient has had "dark colored stool" recently\par and has not been on supplemental iron. She is also on Eliquis for Atrial fibrillation. We reviewed her records and her iron studies show:\par \par Iron with Total Binding Capacity in AM (01.14.22 @ 19:00)\par  Iron - Total Binding Capacity.: 400 ug/dL\par  % Saturation, Iron: 6 %\par  Iron Total, Serum: 26 ug/dL\par  Unsaturated Iron Binding Capacity: 374: Hemolyzed. Interpret with caution\par ug/dL\par Ferritin, Serum: 48 ng/mL (01.14.22 @ 04:30)\par \par There values were done after transfusion of 1 unit of pRBC. The Ferritin is\par only 48 with %sat of 6 indicating she is in fact iron deficient. Of note, her\par hemoglobin 6 months ago in July was 13. Her last colonoscopy was about 6-7 years ago.\par \par Additional studies were also done that showed Normal HgB electophoresis, Haptoglobin was 92,  LDH was 186 Retic 2.9 with abs 78.3\par \par She had a dose of Venofer 200 mg IV once as well.  \par \par Apixian was held\par \par CBC on 1/16/22 on discharge  showed hgb of 8.3\par \par B12 and folate was normal in hospital\par \par \par Imaging:\par 1/13/22 CT abdomen: No evinced of GI bleeding\par  [de-identified] : 1/25/22\par She is here for follow up with her son. She saw GI yesterday and given her age it was felt that she would not benefit from endoscopic procedures. She states her stool is normal now. [facility]22 CMP normal, CBC  WBC 5.5, Hgb 9.2, Plts 157,  MCV is 84\par \par 3/4/22\par We did a telephone  visit they could not connect via telehealth. The daughter was on the phone with us, speakerphone with the patient at the same time She denied bleeding. She competed her  Venofer  on 2/18/2022.\par She has follow up with GI Dr. Paulino and is for possible Capsule.  Since the gastroenterologist did not want to place her through endoscopies which is reasonable.  She remains on Eliquis and most recent blood work from February 18 Showed a hemoglobin of 10.8 platelets are 116,000 MCV is normal iron studies were normal with ferritin of 262

## 2022-03-04 NOTE — REVIEW OF SYSTEMS
[Negative] : Allergic/Immunologic [Fatigue] : no fatigue [Lower Ext Edema] : no lower extremity edema [Skin Rash] : no skin rash

## 2022-03-04 NOTE — ASSESSMENT
[FreeTextEntry1] : #Iron deficiency anemia likely secondary to gastrointestinal bleeding given patient has been having black stools and also was on  anticoagulation\par -s/p  Venofer 200 mg IV  5 doses on 2/18/22\par -Most recent CBC shows mild anemia around 11.  We will continue to observe no need for bone marrow biopsy at this time\par -seen by GI and for possible Capsule Endoscopy,  is on PPI \par -apixaban right now remains on hold. If CBC is stable in the future and no further black stools  it will be her preference if she wishes to restart Apixaban (  Risk of bleeding vs risk of CVA prevention)  foe now remains on hold as per her preferance also in agreement with rest of her team\par \par \par #Mild thrombocytopenia at this point we will just monitor may be due to PPIs versus consumption from bleeding versus sequestration/hypersplenism  depending on the degree of heart failure\par -No interventions needed we will monitor\par \par We will check CBC in 6 weeks with iron studies and she will see us back in 2 months  via  telehealth visit

## 2022-03-16 ENCOUNTER — INPATIENT (INPATIENT)
Facility: HOSPITAL | Age: 87
LOS: 14 days | Discharge: NURSING, REHAB OR LONG-TERM CARE | End: 2022-03-31
Attending: INTERNAL MEDICINE | Admitting: INTERNAL MEDICINE
Payer: MEDICARE

## 2022-03-16 VITALS
SYSTOLIC BLOOD PRESSURE: 126 MMHG | TEMPERATURE: 99 F | OXYGEN SATURATION: 95 % | HEIGHT: 57 IN | HEART RATE: 60 BPM | DIASTOLIC BLOOD PRESSURE: 58 MMHG | RESPIRATION RATE: 16 BRPM

## 2022-03-16 DIAGNOSIS — Z96.652 PRESENCE OF LEFT ARTIFICIAL KNEE JOINT: Chronic | ICD-10-CM

## 2022-03-16 DIAGNOSIS — Z98.890 OTHER SPECIFIED POSTPROCEDURAL STATES: Chronic | ICD-10-CM

## 2022-03-16 DIAGNOSIS — C06.9 MALIGNANT NEOPLASM OF MOUTH, UNSPECIFIED: Chronic | ICD-10-CM

## 2022-03-16 DIAGNOSIS — Z96.651 PRESENCE OF RIGHT ARTIFICIAL KNEE JOINT: Chronic | ICD-10-CM

## 2022-03-16 LAB
ALBUMIN SERPL ELPH-MCNC: 3.7 G/DL — SIGNIFICANT CHANGE UP (ref 3.5–5.2)
ALP SERPL-CCNC: 95 U/L — SIGNIFICANT CHANGE UP (ref 30–115)
ALT FLD-CCNC: 9 U/L — SIGNIFICANT CHANGE UP (ref 0–41)
ANION GAP SERPL CALC-SCNC: 11 MMOL/L — SIGNIFICANT CHANGE UP (ref 7–14)
APTT BLD: 33.6 SEC — SIGNIFICANT CHANGE UP (ref 27–39.2)
AST SERPL-CCNC: 15 U/L — SIGNIFICANT CHANGE UP (ref 0–41)
BASOPHILS # BLD AUTO: 0.03 K/UL — SIGNIFICANT CHANGE UP (ref 0–0.2)
BASOPHILS NFR BLD AUTO: 0.3 % — SIGNIFICANT CHANGE UP (ref 0–1)
BILIRUB SERPL-MCNC: 0.4 MG/DL — SIGNIFICANT CHANGE UP (ref 0.2–1.2)
BLD GP AB SCN SERPL QL: SIGNIFICANT CHANGE UP
BUN SERPL-MCNC: 31 MG/DL — HIGH (ref 10–20)
CALCIUM SERPL-MCNC: 9.4 MG/DL — SIGNIFICANT CHANGE UP (ref 8.5–10.1)
CHLORIDE SERPL-SCNC: 102 MMOL/L — SIGNIFICANT CHANGE UP (ref 98–110)
CO2 SERPL-SCNC: 24 MMOL/L — SIGNIFICANT CHANGE UP (ref 17–32)
CREAT SERPL-MCNC: 0.8 MG/DL — SIGNIFICANT CHANGE UP (ref 0.7–1.5)
EGFR: 71 ML/MIN/1.73M2 — SIGNIFICANT CHANGE UP
EOSINOPHIL # BLD AUTO: 0.02 K/UL — SIGNIFICANT CHANGE UP (ref 0–0.7)
EOSINOPHIL NFR BLD AUTO: 0.2 % — SIGNIFICANT CHANGE UP (ref 0–8)
GLUCOSE SERPL-MCNC: 114 MG/DL — HIGH (ref 70–99)
HCT VFR BLD CALC: 30.9 % — LOW (ref 37–47)
HGB BLD-MCNC: 9.7 G/DL — LOW (ref 12–16)
IMM GRANULOCYTES NFR BLD AUTO: 0.3 % — SIGNIFICANT CHANGE UP (ref 0.1–0.3)
INR BLD: 1.06 RATIO — SIGNIFICANT CHANGE UP (ref 0.65–1.3)
LYMPHOCYTES # BLD AUTO: 1.27 K/UL — SIGNIFICANT CHANGE UP (ref 1.2–3.4)
LYMPHOCYTES # BLD AUTO: 12.3 % — LOW (ref 20.5–51.1)
MCHC RBC-ENTMCNC: 27.6 PG — SIGNIFICANT CHANGE UP (ref 27–31)
MCHC RBC-ENTMCNC: 31.4 G/DL — LOW (ref 32–37)
MCV RBC AUTO: 87.8 FL — SIGNIFICANT CHANGE UP (ref 81–99)
MONOCYTES # BLD AUTO: 0.74 K/UL — HIGH (ref 0.1–0.6)
MONOCYTES NFR BLD AUTO: 7.2 % — SIGNIFICANT CHANGE UP (ref 1.7–9.3)
NEUTROPHILS # BLD AUTO: 8.21 K/UL — HIGH (ref 1.4–6.5)
NEUTROPHILS NFR BLD AUTO: 79.7 % — HIGH (ref 42.2–75.2)
NRBC # BLD: 0 /100 WBCS — SIGNIFICANT CHANGE UP (ref 0–0)
PLATELET # BLD AUTO: 133 K/UL — SIGNIFICANT CHANGE UP (ref 130–400)
POTASSIUM SERPL-MCNC: 4.9 MMOL/L — SIGNIFICANT CHANGE UP (ref 3.5–5)
POTASSIUM SERPL-SCNC: 4.9 MMOL/L — SIGNIFICANT CHANGE UP (ref 3.5–5)
PROT SERPL-MCNC: 6.2 G/DL — SIGNIFICANT CHANGE UP (ref 6–8)
PROTHROM AB SERPL-ACNC: 12.2 SEC — SIGNIFICANT CHANGE UP (ref 9.95–12.87)
RBC # BLD: 3.52 M/UL — LOW (ref 4.2–5.4)
RBC # FLD: 25 % — HIGH (ref 11.5–14.5)
SARS-COV-2 RNA SPEC QL NAA+PROBE: SIGNIFICANT CHANGE UP
SODIUM SERPL-SCNC: 137 MMOL/L — SIGNIFICANT CHANGE UP (ref 135–146)
WBC # BLD: 10.3 K/UL — SIGNIFICANT CHANGE UP (ref 4.8–10.8)
WBC # FLD AUTO: 10.3 K/UL — SIGNIFICANT CHANGE UP (ref 4.8–10.8)

## 2022-03-16 PROCEDURE — 73590 X-RAY EXAM OF LOWER LEG: CPT | Mod: 26,RT

## 2022-03-16 PROCEDURE — 73502 X-RAY EXAM HIP UNI 2-3 VIEWS: CPT | Mod: 26,RT

## 2022-03-16 PROCEDURE — 71045 X-RAY EXAM CHEST 1 VIEW: CPT | Mod: 26

## 2022-03-16 PROCEDURE — 99223 1ST HOSP IP/OBS HIGH 75: CPT

## 2022-03-16 PROCEDURE — 99284 EMERGENCY DEPT VISIT MOD MDM: CPT

## 2022-03-16 PROCEDURE — 99285 EMERGENCY DEPT VISIT HI MDM: CPT | Mod: FS

## 2022-03-16 PROCEDURE — 73552 X-RAY EXAM OF FEMUR 2/>: CPT | Mod: 26,RT

## 2022-03-16 PROCEDURE — 73564 X-RAY EXAM KNEE 4 OR MORE: CPT | Mod: 26,RT

## 2022-03-16 PROCEDURE — 93010 ELECTROCARDIOGRAM REPORT: CPT

## 2022-03-16 RX ORDER — PANTOPRAZOLE SODIUM 20 MG/1
40 TABLET, DELAYED RELEASE ORAL
Refills: 0 | Status: DISCONTINUED | OUTPATIENT
Start: 2022-03-16 | End: 2022-03-21

## 2022-03-16 RX ORDER — ENOXAPARIN SODIUM 100 MG/ML
75 INJECTION SUBCUTANEOUS EVERY 12 HOURS
Refills: 0 | Status: DISCONTINUED | OUTPATIENT
Start: 2022-03-16 | End: 2022-03-17

## 2022-03-16 RX ORDER — SIMVASTATIN 20 MG/1
20 TABLET, FILM COATED ORAL AT BEDTIME
Refills: 0 | Status: DISCONTINUED | OUTPATIENT
Start: 2022-03-16 | End: 2022-03-21

## 2022-03-16 RX ORDER — MORPHINE SULFATE 50 MG/1
4 CAPSULE, EXTENDED RELEASE ORAL ONCE
Refills: 0 | Status: DISCONTINUED | OUTPATIENT
Start: 2022-03-16 | End: 2022-03-16

## 2022-03-16 RX ORDER — CHOLECALCIFEROL (VITAMIN D3) 125 MCG
2000 CAPSULE ORAL DAILY
Refills: 0 | Status: DISCONTINUED | OUTPATIENT
Start: 2022-03-16 | End: 2022-03-21

## 2022-03-16 RX ORDER — PREDNISOLONE SODIUM PHOSPHATE 1 %
1 DROPS OPHTHALMIC (EYE) DAILY
Refills: 0 | Status: DISCONTINUED | OUTPATIENT
Start: 2022-03-16 | End: 2022-03-21

## 2022-03-16 RX ORDER — FUROSEMIDE 40 MG
20 TABLET ORAL
Refills: 0 | Status: DISCONTINUED | OUTPATIENT
Start: 2022-03-16 | End: 2022-03-17

## 2022-03-16 RX ORDER — METOPROLOL TARTRATE 50 MG
12.5 TABLET ORAL DAILY
Refills: 0 | Status: DISCONTINUED | OUTPATIENT
Start: 2022-03-16 | End: 2022-03-21

## 2022-03-16 RX ORDER — SODIUM CHLORIDE 9 MG/ML
1000 INJECTION, SOLUTION INTRAVENOUS
Refills: 0 | Status: DISCONTINUED | OUTPATIENT
Start: 2022-03-16 | End: 2022-03-19

## 2022-03-16 RX ORDER — MORPHINE SULFATE 50 MG/1
2 CAPSULE, EXTENDED RELEASE ORAL EVERY 4 HOURS
Refills: 0 | Status: DISCONTINUED | OUTPATIENT
Start: 2022-03-16 | End: 2022-03-21

## 2022-03-16 RX ORDER — SENNA PLUS 8.6 MG/1
2 TABLET ORAL AT BEDTIME
Refills: 0 | Status: DISCONTINUED | OUTPATIENT
Start: 2022-03-16 | End: 2022-03-21

## 2022-03-16 RX ORDER — PREDNISOLONE SODIUM PHOSPHATE 1 %
1 DROPS OPHTHALMIC (EYE)
Qty: 0 | Refills: 0 | DISCHARGE

## 2022-03-16 RX ORDER — ONDANSETRON 8 MG/1
4 TABLET, FILM COATED ORAL ONCE
Refills: 0 | Status: COMPLETED | OUTPATIENT
Start: 2022-03-16 | End: 2022-03-16

## 2022-03-16 RX ORDER — ENOXAPARIN SODIUM 100 MG/ML
80 INJECTION SUBCUTANEOUS ONCE
Refills: 0 | Status: COMPLETED | OUTPATIENT
Start: 2022-03-16 | End: 2022-03-16

## 2022-03-16 RX ADMIN — ENOXAPARIN SODIUM 80 MILLIGRAM(S): 100 INJECTION SUBCUTANEOUS at 19:10

## 2022-03-16 RX ADMIN — MORPHINE SULFATE 4 MILLIGRAM(S): 50 CAPSULE, EXTENDED RELEASE ORAL at 14:58

## 2022-03-16 RX ADMIN — ONDANSETRON 4 MILLIGRAM(S): 8 TABLET, FILM COATED ORAL at 14:56

## 2022-03-16 RX ADMIN — MORPHINE SULFATE 4 MILLIGRAM(S): 50 CAPSULE, EXTENDED RELEASE ORAL at 15:30

## 2022-03-16 NOTE — H&P ADULT - NSHPPHYSICALEXAM_GEN_ALL_CORE
GEN: NAD, Well Appearing, Well nourished  HEENT: NCAT, PERRL, EOMI, No Icterus, No Pallor, No nystagmus, Vision Intact, No JVD/TD  CV/CHEST: RRR, +S1/S2, no murmurs  PULM: CTAB, Good Air Entry Bilaterally  ABD: SNTTP, ND x 4 Q's  EXT: LLE edema non pitting, RLE ext rotated. Warm, Well Perfused x 4. No Edema  SKIN: No Rash  NEURO: AxOx3, + Normal Affect, mildly hard of hearing

## 2022-03-16 NOTE — ED PROVIDER NOTE - NS ED ATTENDING STATEMENT MOD
This was a shared visit with the ZORAIDA. I reviewed and verified the documentation and independently performed the documented:

## 2022-03-16 NOTE — ED ADULT NURSE REASSESSMENT NOTE - NS ED NURSE REASSESS COMMENT FT1
BP 84/41 , Dr >Mario at the bedside aware , pt asymptomatic , denies chest pain , denies dizziness , denies headache , denies nausea , denies knee pain , no SOB

## 2022-03-16 NOTE — ED PROVIDER NOTE - ATTENDING CONTRIBUTION TO CARE
87yoF with hx of  chronic HFpEF, chronic aflutter on Eliquis, HTN, DLD , Bilateral knee replacements presents with right leg pain after fall.  Tripped over the saddle going into her house from the garage, landed on her right knee.  Has pain there.  Denies hitting her head or any pain anywhere else.  On exam nontoxic, vital signs stable, right knee tender palpation anteriorly and medially.  Limited range of motion cannot lift above gravity due to pain.  Mild anterior distal thigh pain but no break in skin.  No hip or tibia/fibular tenderness to palpation.  2+ DP pulse.  Sensation intact.  Normocephalic, atraumatic, C-spine nontender, lungs clear bilaterally, heart regular no murmurs, abdomen benign, bilateral upper extremity and left lower extremity full range of motion no tenderness throughout.  X-ray with comminuted and displaced distal right femur fracture which appears to involve the prosthetic joint.  Orthopedics and trauma consulted.  Anticipate admission to orthopedics or trauma.  Labs and further imaging pending.

## 2022-03-16 NOTE — ED PROVIDER NOTE - PHYSICAL EXAMINATION
CONST: Well appearing in NAD  EYES: PERRL, EOMI, Sclera and conjunctiva clear.   ENT: No nasal discharge. Oropharynx normal appearing  NECK: Non-tender, no meningeal signs. normal ROM. supple   CARD: Normal S1 S2; Normal rate and rhythm  RESP: Equal BS B/L, No wheezes, rhonchi or rales. No distress  GI: Soft, non-tender, non-distended. no cva tenderness. normal BS  MS: Tenderness and limited ROM to right knee, Normal ROM in all extremities. No midline spinal tenderness. pulses 2 +. no calf tenderness or swelling  SKIN: Warm, dry, no acute rashes. Good turgor  NEURO: A&Ox3, No focal deficits.

## 2022-03-16 NOTE — ED PROVIDER NOTE - PROGRESS NOTE DETAILS
aayush: signed out to Dr. Barber pending ortho and trauma recs, cxr, tib/fib xr, labs. Trauma and ortho evaluated pt - cleared by trauma, ortho recommends admission to medicine for medical clearance for surgery. endorsed to mar

## 2022-03-16 NOTE — ED ADULT NURSE NOTE - ED CARDIAC RHYTHM
1/24/2017 2:02 PM 
 
Patient:  Mal Weston YOB: 1968 Date of Visit: 1/23/2017 Dear Dr. Nguyen Rose: 
 
Thank you for referring Ms. Jayna Hinson to me for evaluation/treatment. Below are the relevant portions of my assessment and plan of care. HISTORY OF PRESENT ILLNESS Mal Weston is a 50 y.o. female. HPI 
NEW patient presents for consultation at the request of Dr. Berna Arteaga for abnormal mammogram and ultrasound LEFT breast which revealed a suspicious mass at 3:00 in the LEFT breast.  
The patient reports that she has not felt any breast lump, has no nipple discharge/retraction or skin change. Has had some breast pain, but this is primarily on palpation of the breasts. She has a history of a RIGHT breast ductal excision by Dr. Celeste Monroe in 2/2014 for bloody nipple discharge. Thinks that path showed a papilloma. There is no FH of breast or ovarian cancer. Recent imaging has been at [address], BIR[facility] 4C suspicious. Past Medical History Diagnosis Date  Celiac disease  Diabetes ([facility] [address]  Essential hypertension, benign  Family history of ischemic heart disease  Obesity, unspecified  Other and unspecified hyperlipidemia  Precordial pain  Rheumatoid arthritis ([facility] [address] Past Surgical History Procedure Laterality Date  Hx tonsillectomy  Hx leep procedure  Hx orthopaedic    
  excision mortons neuroma, left foot, x2  
 Hx gyn    
  right areolar ductal excision  Upper gi endoscopy,biopsy  8/18/2016  Pr breast surgery procedure unlisted  2014 RIGHT ductal excision Social History Social History  Marital status:  Spouse name: N/A  
 Number of children: N/A  
 Years of education: N/A Occupational History  Not on file. Social History Main Topics  Smoking status: Former Smoker Packs/day: 0.25 Years: 15.00  Smokeless tobacco: Former User Quit date: 8/18/1996  Alcohol use No  
 Drug use: No  
 Sexual activity: Yes  
  Partners: Male Other Topics Concern  Not on file Social History Narrative Current Outpatient Prescriptions on File Prior to Visit Medication Sig Dispense Refill  gabapentin (NEURONTIN) 600 mg tablet Take 600 mg by mouth daily.  loratadine (CLARITIN) 10 mg tablet Take 10 mg by mouth daily.  folic acid (FOLVITE) 1 mg tablet Take 1 mg by mouth daily.  aspirin delayed-release 81 mg tablet Take 81 mg by mouth daily.  METHOTREXATE Take 10 mg by mouth every seven (7) days.  esomeprazole (NEXIUM) 40 mg capsule Take 1 Cap by mouth daily. Indications: HEARTBURN 90 Cap 2  
 ondansetron (ZOFRAN ODT) 4 mg disintegrating tablet Take 1 Tab by mouth every eight (8) hours as needed for Nausea. 30 Tab 1 No current facility-administered medications on file prior to visit. Allergies Allergen Reactions  Sulfa (Sulfonamide Antibiotics) Anaphylaxis  Codeine Nausea Only  Flagyl [Metronidazole] Hives  Keflex [Cephalexin] Hives OB History Obstetric Comments Menarche:  6. LMP: 1/15/17. # of Children:  1. Age at Delivery of First Child:  21.   Hysterectomy/oophorectomy:  NO/NO. Breast Bx:  No.  Hx of Breast Feeding:  Yes. BCP:  Yes, in the past. Hormone therapy:  No.  
  
  
 
 
ROS Constitutional: Negative. HENT: Negative. Eyes: Negative. Respiratory: Negative. Cardiovascular: Negative. Gastrointestinal: Positive for nausea and vomiting. Genitourinary: Negative. Musculoskeletal: Positive for back pain, joint pain and myalgias. Skin: Negative. Neurological: Negative. Endo/Heme/Allergies: Negative. Psychiatric/Behavioral: Negative. Physical Exam  
Cardiovascular: Normal rate and normal heart sounds.    
Pulmonary/Chest: Breath sounds normal. Right breast exhibits no inverted nipple, no mass, no nipple discharge, no skin change and no tenderness. Left breast exhibits no inverted nipple, no mass, no nipple discharge, no skin change and no tenderness. Breasts are symmetrical.  
Lymphadenopathy:  
     Right cervical: No superficial cervical, no deep cervical and no posterior cervical adenopathy present. Left cervical: No superficial cervical, no deep cervical and no posterior cervical adenopathy present. Right axillary: No pectoral and no lateral adenopathy present. Left axillary: No pectoral and no lateral adenopathy present. BREAST ULTRASOUND Indication: Left breast mass 3:00 Technique: The area was scanned using a high-frequency linear-array near-field transducer Findings: 1.0 x 1.1 cm mass, irregular, hypoechoic, dropout Impression: Suspicious mass Disposition: Ultrasound-guided biopsy performed US - Guided Core Biopsy Following detailed explanation and  description of the Biopsy procedure, its risk, benefits and possible alternatives, the patient signed the informed consent. Indication : Mass, Ultrasound Visible, leftBreast3 o'clock Prep : We cleansed the skin with alcohol. Anesthesia : We anesthetized the skin and underlying tissues with 1% lidocaine with epinephrine. Device : We advanced the BARD Marquee device through the lesion and captured tissue with real-time Ultrasound Confirmation. Core Sampling : We repeated this sampling for the following number of cores, 3. Marker : We placed a marking clip to sulma the biopsy site. Marker Type : SENOMARK. Dressing : We then closed the incision with steristrips and placed a sterile dressing. Instructions : The patient was instructed regarding post-procedure care and activities. Pathology : Pending at this time. Patient tolerated procedure well and discharged in stable condition. Informed patient that they will be notified of pathology results in 3 to 5 days. ASSESSMENT and PLAN 
  ICD-10-CM ICD-9-CM 1. Left breast mass N63 611.72   
2. Abnormal mammogram of left breast R92.8 793.80 Pt presents with abnormal mammo of LT breast (3:00). Biopsied site today without complications, which pt tolerated well. Will f/u once these results become available. This plan was reviewed with the patient and patient agrees. All questions were answered. Written by Kimmy Hernandez, as dictated by Dr. Dread Menjivar MD.  
 
If you have questions, please do not hesitate to call me. I look forward to following MsTeodoro Eliel Nichols along with you.  
 
 
 
Sincerely, 
 
 
Tino West MD 
 regular

## 2022-03-16 NOTE — ED PROVIDER NOTE - CLINICAL SUMMARY MEDICAL DECISION MAKING FREE TEXT BOX
Patient signed out to me from Dr. Gonzalez.  Patient with periprosthetic fracture status post mechanical trip and fall.  No other acute injuries.  Status post trauma and orthopedic evaluation.  Will admit for further evaluation.

## 2022-03-16 NOTE — H&P ADULT - ATTENDING COMMENTS
***Supplemental information provided by Daughter/Son at bedside.     87 YO F w/ a PMH of HFpEF, chronic Afib (Not on AC, recent hx of GIB and multiple falls), Anemia from GIB (but never proven s/p neg EGD and C-scope, plan was for out-pt pill endoscopy), HTN, and HLD who was BIBEMS for eval of inability to ambulate due to RLE pain s/p mechanical trip and fall. - HT, - AC, and - LOC. Remembers entire event. No preceding symptoms of CP, SOB, dizziness, focal weakness, or headaches. No seizure-like activity. ROS negative, except as stated above    In the ED, periprosthetic right distal femoral fracture. Ortho consulted and will take pt to OR, pending risk stratification and medical optimization.     FMHx: Reviewed, not relevant    Physical exam shows pt in NAD. VSS, afebrile, not hypoxic on RA. A&Ox3. Head is atraumatic. Neuro shows a non-focal exam. Motor strength/sensation is intact. CTA B/L with no W/C/R. RRR, no M/G/R. ABD is soft and non-tender, normoactive BSs. Right/Left LE with shortening and external rotation, pulses and sensation intact, and TTP over the hip. No rashes. Labs and radiology as above. QTc 453    Inability to ambulate due to right lower extremity pain from right distal periprosthetic femoral fracture s/p mechanical fall. Ortho is following, plan for OR. NPO at MN. Pre-op labs, Chest X-ray, and EKG. Active T&S. PRN pain meds. Monitor compartments/pulses. PT consult afterwards. Fall precautions. DVT PPX recs as per Ortho.   Serious complication:  Any Complication:  -Pt is low risk for proposed surgical procedure. Pt is medically optimized      INCOMPLETE ***Supplemental information provided by Daughter/Son at bedside.     85 YO F w/ a PMH of HFpEF, chronic Afib (Not on AC, recent hx of GIB and multiple falls), Anemia from GIB (but never proven s/p neg EGD and C-scope, plan was for out-pt pill endoscopy), HTN, and HLD who was BIBEMS for eval of inability to ambulate due to RLE pain s/p mechanical trip and fall. - HT, - AC, and - LOC. Remembers entire event. No preceding symptoms of CP, SOB, dizziness, focal weakness, or headaches. No seizure-like activity. ROS negative, except as stated above    In the ED, periprosthetic right distal femoral fracture. Ortho consulted and will take pt to OR, pending risk stratification and medical optimization.     FMHx: Reviewed, not relevant    Physical exam shows pt in NAD. VSS, afebrile, not hypoxic on RA. A&Ox3. Head is atraumatic. Neuro shows a non-focal exam. Motor strength/sensation is intact. CTA B/L with no W/C/R. RRR, no M/G/R. ABD is soft and non-tender, normoactive BSs. Right/Left LE with shortening and external rotation, pulses and sensation intact, and TTP over the hip. No rashes. Labs and radiology as above. QTc 453    Inability to ambulate due to right lower extremity pain from right distal periprosthetic femoral fracture s/p mechanical fall. Ortho is following, plan for OR. NPO at MN. Pre-op labs, Chest X-ray, and EKG. Active T&S. PRN pain meds. Monitor compartments/pulses. PT consult afterwards. Fall precautions. DVT PPX recs as per Ortho.   Serious complication: 3.8%  Any Complication: 4.8%  -Pt is moderate-to-high risk for proposed surgical procedure. Pt is medically optimized  -Pt recently had GIB and source was never located, plan was for pill endoscopy as an out-pt. However, this was planned for March 31. She will need AC after the surgery as she is an extremely high risk for post-op thromboembolism. We had a lengthy conversation with the pt and family members at bedside regarding these potential risks and they would like to proceed w/ surgical correction understanding that she will need post-op AC which could cause a recurrence of her GIB.    HX of HFpEF, chronic Afib (Not on AC, recent hx of GIB and multiple falls), Anemia from GIB (but never proven s/p neg EGD and C-scope, plan was for out-pt pill endoscopy), HTN, and HLD. Restart home meds, except as stated above. DVT PPX. Inform PCP of pt's admission to hospital. My note supersedes the residents note.     Spoke with family: Daughter/Son at bedside    Date seen by Attending: 3/16/22 ***Supplemental information provided by Daughter/Son at bedside.     85 YO F w/ a PMH of HFpEF, chronic Afib (Not on AC, recent hx of GIB and multiple falls), Anemia from GIB (but never proven s/p neg EGD and C-scope, plan was for out-pt pill endoscopy), HTN, and HLD who was BIBEMS for eval of inability to ambulate due to RLE pain s/p mechanical trip and fall. - HT, - AC, and - LOC. Remembers entire event. No preceding symptoms of CP, SOB, dizziness, focal weakness, or headaches. No seizure-like activity. ROS negative, except as stated above    In the ED, periprosthetic right distal femoral fracture. Ortho consulted and will take pt to OR, pending risk stratification and medical optimization.     FMHx: Reviewed, not relevant    Physical exam shows pt in NAD. VSS, afebrile, not hypoxic on RA. A&Ox3. Head is atraumatic. Neuro shows a non-focal exam. Motor strength/sensation is intact. CTA B/L with no W/C/R. RRR, no M/G/R. ABD is soft and non-tender, normoactive BSs. Right LE with external rotation, knee immobilizer in place, pulses and sensation intact, and TTP over the hip. No rashes. Labs and radiology as above. QTc 453    Inability to ambulate due to right lower extremity pain from right distal periprosthetic femoral fracture s/p mechanical fall. Ortho is following, plan for OR. NPO at MN. Pre-op labs, Chest X-ray, and EKG. Active T&S. PRN pain meds. Monitor compartments/pulses. PT consult afterwards. Fall precautions. DVT PPX recs as per Ortho.   Serious complication: 3.8%  Any Complication: 4.8%  -Pt is moderate-to-high risk for proposed surgical procedure. Pt is medically optimized  -Pt recently had GIB and source was never located, plan was for pill endoscopy as an out-pt. However, this was planned for March 31. She will need AC after the surgery as she is an extremely high risk for post-op thromboembolism. We had a lengthy conversation with the pt and family members at bedside regarding these potential risks and they would like to proceed w/ surgical correction understanding that she will need post-op AC which could cause a recurrence of her GIB.    HX of HFpEF, chronic Afib (Not on AC, recent hx of GIB and multiple falls), Anemia from GIB (but never proven s/p neg EGD and C-scope, plan was for out-pt pill endoscopy), HTN, and HLD. Restart home meds, except as stated above. DVT PPX. Inform PCP of pt's admission to hospital. My note supersedes the residents note.     Spoke with family: Daughter/Son at bedside    Date seen by Attending: 3/16/22

## 2022-03-16 NOTE — H&P ADULT - HISTORY OF PRESENT ILLNESS
HPI: 86 F PMH HFpEF, Afib on Eliquis, HTN, and HLD BIBEMS s/p mechanical fall at home. Pt lives with her daughter and went to her old home to collect some documents and tripped in the doorway and landed on her right knee. Had immediate pain, unable to ambulate afterwards. Pt normally ambulates w/ a cane. Pt underwent left TKA by Dr. Morton in 2007 and a right TKA by Dr. Perkins at [facility] approx 8 years ago. right PATRICK Type II periprosthetic distal femur fracture. Pt placed in a knee immobilizer.  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 Pt lives with her daughter and went to her old home to collect some documents and tripped in the doorway and landed on her right knee. Had immediate pain, unable to ambulate afterwards. Pt normally ambulates w/ a cane. Pt underwent left TKA by Dr. Morton in 2007 and a right TKA by Dr. Perkins at [facility] approx 8 years ago. right PATRICK Type II periprosthetic distal femur fracture. Pt placed in a knee immobilizer.     ED COURSE: As above, HD stable

## 2022-03-16 NOTE — ED PROVIDER NOTE - OBJECTIVE STATEMENT
87 year old female with pmhx of chf, aflutter s/p cardioversion, anemia, hld, and bilateral knee replacement presents s/p fall. Pt had mechanical fall , tripped and fell onto right knee. complaining of right knee pain - unable to ambulate. no head trauma, loc, neck or back pain. No dizziness, chest pain, sob, abd pain or nausea, vomiting, diarrhea.

## 2022-03-16 NOTE — H&P ADULT - NSHPLABSRESULTS_GEN_ALL_CORE
< from: TTE Echo Complete w/o Contrast w/ Doppler (05.02.21 @ 09:36) >    Summary:   1. LV Ejection Fraction by Pinto's Method with a biplane EF of 65 %.   2. Normal global left ventricular systolic function.   3. Moderate left ventricular hypertrophy.   4. Spectral Doppler shows pseudo-normal pattern of left ventricular myocardial filling (Grade II diastolic dysfunction).   5. Severely enlarged left atrium.   6. Severely enlarged right atrium.   7. Sclerotic aortic valve with normal opening.   8. Mild aortic regurgitation.   9. Thickening and calcification of the anterior and posterior mitral leaflets.  10. Mild-to-moderate mitral regurgitation.  11. Moderate tricuspid regurgitation.  12. Estimated pulmonary artery systolic pressure is 36.7 mmHg assuming a right atrial pressure of 15 mmHg, which is consistent with borderline pulmonary hypertension.    < end of copied text >

## 2022-03-16 NOTE — H&P ADULT - ASSESSMENT
IMPRESSION  # R Periprosthetic Femur Fx  # pAFib on Eliquis  # Anemia of Chronic Inflammation  # CAD/DLD    NWB Right LE  Knee immobilizer  DVT ppx: SCD, lovenox; pt should get stat dose lovenox now  Pain control  Home meds  Please obtain preop labs: cmp, cbc, coags, type and screen x2, cxr  Serial Hgb/Hct; transfuse prn  Rapid COVID test  Will plan for surgery once medically optimized IMPRESSION  # R Periprosthetic Femur Fx  # pAFib no longer on AC   # Anemia suspected 2/2 GIB but workup neg to date  # CAD/DLD  # HFpEF not in exacerbation  # h/o Tonsillar Cancer    PLAN  # R Periprosthetic Femur Fx  - NWB Right LE, Knee immobilizer  - DVT ppx: SCD, lovenox  - Pain control  - preop labs: cmp, cbc, coags, type and screen x2, cxr  - Rapid COVID test      # HFpEF not in exacerbation  # pAFib no longer on AC   - Check TTE  - c/w Metoprolol Succ ER 12.5 QD  - c/w Lasix 2 times per week/3 times per week alternating   - Ensure Euvolemia (or as close to it as feasible) prior to surgery    # Anemia suspected 2/2 GIB but workup neg to date  - Check Iron w/TIBC, Check Ferritin, Type and Screen  - Check fecal occult immuno     # DLD  - c/w Statin     # h/o Tonsillar Cancer  - s/p surgical resection of mass from soft pallate, ensure anesthesia is aware pt has obturator covering hole in post oropharynx prior to surgery    Medical Pre Op Risk Strat   - Mod to High risk for a Moderate Risk procedure from medical standpoint

## 2022-03-16 NOTE — CONSULT NOTE ADULT - ASSESSMENT
Orthopaedic Surgery Consult Note      86 yo Female BIBEMS s/p mechanical fall at home. Pt lives with her daughter and went to her old home to collect some documents and tripped in the doorway and landed on her right knee. Had immediate pain, unable to ambulate afterwards. Pt normally ambulates w/ a cane. Pt underwent left TKA by Dr. Morton in 2007 and a right TKA by Dr. Perkins at [facility] approx 8 years ago. No problems w/ knees since surgery. Denies head injury/LOC.    PMH: afib, renal stones, HLD, CHF, vascular disease, HTN, Bladder polyp  PSH:  left TKA by Dr. Morton in 2007 and a right TKA by Dr. Perkins at [facility] approx 8 years ago; oral surgery for squamous cell CA    Home meds:   Lasix 20 mg oral tablet: 1 tab(s) orally 2 times a week then the following week take it 3 times per week (16 Jan 2022 10:29)  metoprolol succinate 25 mg oral tablet, extended release: 0.5 tab(s) orally Tuesday, Wednesday, Thursday, Saturday, Sunday (14 Jan 2022 11:01)  prednisoLONE acetate 1% ophthalmic suspension: 1 drop(s) in the right eye once a day (14 Jan 2022 11:04)        Allergies  No Known Allergies        T(C): 37.1 (03-16-22 @ 12:05), Max: 37.1 (03-16-22 @ 12:05)  HR: 60 (03-16-22 @ 12:05) (60 - 60)  BP: 126/58 (03-16-22 @ 12:05) (126/58 - 126/58)  RR: 16 (03-16-22 @ 12:05) (16 - 16)  SpO2: 95% (03-16-22 @ 12:05) (95% - 95%)    P/E:  AOx3, NAD  Non-labored breathing      Right LE  Skin intact  Swelling around knee  No gross deformities  TTP over distal femur; non-TTP proximally or distally in extremity  Compartments soft and compressible  SILT SP/DP/T/Sural/Saph  Firing TA/EHL/FHL/GS  DP pulse 1+, WWP        Labs    03-16    137  |  102  |  31<H>  ----------------------------<  114<H>  4.9   |  24  |  0.8    Ca    9.4      16 Mar 2022 14:41    TPro  6.2  /  Alb  3.7  /  TBili  0.4  /  DBili  x   /  AST  15  /  ALT  9   /  AlkPhos  95  03-16    LIVER FUNCTIONS - ( 16 Mar 2022 14:41 )  Alb: 3.7 g/dL / Pro: 6.2 g/dL / ALK PHOS: 95 U/L / ALT: 9 U/L / AST: 15 U/L / GGT: x           PT/INR - ( 16 Mar 2022 14:41 )   PT: 12.20 sec;   INR: 1.06 ratio         PTT - ( 16 Mar 2022 14:41 )  PTT:33.6 sec    Imaging:  Right knee/femur/tibfib: right distal femur fracture at the proximal aspect of the femoral component that extends proximally      A/P:  88yo Female w/ a right PATRICK Type II periprosthetic distal femur fracture. Pt placed in a knee immobilizer. Discussed w/ pt and her daughter that she will require surgery. Pt needs to be medically optimized prior to surgery.     Admit to medicine vs. trauma  NWB Right LE  Knee immobilizer  DVT ppx: SCD, lovenox; pt should get stat dose lovenox now  Pain control  Home meds  Please obtain preop labs: cmp, cbc, coags, type and screen x2, cxr  Serial Hgb/Hct; transfuse prn  Rapid COVID test  Will plan for surgery once medically optimized       Orthopaedic Surgery Consult Note      88 yo Female BIBEMS s/p mechanical fall at home. Pt lives with her daughter and went to her old home to collect some documents and tripped in the doorway and landed on her right knee. Had immediate pain, unable to ambulate afterwards. Pt normally ambulates w/ a cane. Pt underwent left TKA by Dr. Morton in 2007 and a right TKA by Dr. Perkins at \[facility]\"" approx 8 years ago. No problems w/ knees since surgery. Denies head injury/LOC.    PMH: afib, renal stones, HLD, CHF, vascular disease, HTN, Bladder polyp  PSH:  left TKA by Dr. Morton in 2007 and a right TKA by Dr. Perkins at \[facility]\"" approx 8 years ago; oral surgery for squamous cell CA    Home meds:   Lasix 20 mg oral tablet: 1 tab(s) orally 2 times a week then the following week take it 3 times per week (16 Jan 2022 10:29)  metoprolol succinate 25 mg oral tablet, extended release: 0.5 tab(s) orally Tuesday, Wednesday, Thursday, Saturday, Sunday (14 Jan 2022 11:01)  prednisoLONE acetate 1% ophthalmic suspension: 1 drop(s) in the right eye once a day (14 Jan 2022 11:04)        Allergies  No Known Allergies        T(C): 37.1 (03-16-22 @ 12:05), Max: 37.1 (03-16-22 @ 12:05)  HR: 60 (03-16-22 @ 12:05) (60 - 60)  BP: 126/58 (03-16-22 @ 12:05) (126/58 - 126/58)  RR: 16 (03-16-22 @ 12:05) (16 - 16)  SpO2: 95% (03-16-22 @ 12:05) (95% - 95%)    P/E:  AOx3, NAD  Non-labored breathing      Right LE  Skin intact  Swelling around knee  No gross deformities  TTP over distal femur; non-TTP proximally or distally in extremity  Compartments soft and compressible  SILT SP/DP/T/Sural/Saph  Firing TA/EHL/FHL/GS  DP pulse 1+, WWP        Labs    03-16    137  |  102  |  31<H>  ----------------------------<  114<H>  4.9   |  24  |  0.8    Ca    9.4      16 Mar 2022 14:41    TPro  6.2  /  Alb  3.7  /  TBili  0.4  /  DBili  x   /  AST  15  /  ALT  9   /  AlkPhos  95  03-16    LIVER FUNCTIONS - ( 16 Mar 2022 14:41 )  Alb: 3.7 g/dL / Pro: 6.2 g/dL / ALK PHOS: 95 U/L / ALT: 9 U/L / AST: 15 U/L / GGT: x           PT/INR - ( 16 Mar 2022 14:41 )   PT: 12.20 sec;   INR: 1.06 ratio         PTT - ( 16 Mar 2022 14:41 )  PTT:33.6 sec    Imaging:  Right knee/femur/tibfib: right distal femur fracture at the proximal aspect of the femoral component that extends proximally      A/P:  86yo Female w/ a right PATRICK Type II periprosthetic distal femur fracture. Pt placed in a knee immobilizer. Discussed w/ pt and her daughter that she will require surgery. Pt needs to be medically optimized prior to surgery.     Admit to medicine vs. trauma  NWB Right LE  Knee immobilizer  DVT ppx: SCD, lovenox; pt should get stat dose lovenox now  Pain control  Home meds  Please obtain preop labs: cmp, cbc, coags, type and screen x2, cxr  Serial Hgb/Hct; transfuse prn  Rapid COVID test  Will plan for surgery once medically optimized  ACUTE BLOOD LOSS ANEMIA EXPECTED FORM THIS FX HGB OF 10 FOR SURGERY IS RECOMMENDED

## 2022-03-16 NOTE — CHART NOTE - NSCHARTNOTEFT_GEN_A_CORE
Planned Procedure_____________Surgical correction of right distal femur russell-prosthetic fracture_____________    Does the patient have the following conditions? Type Y or N    _N___DVT/PE           	  Currently anticoagulated ______ IVC Filter _______ Date:______    _N___DM                             Controlled    Y _______ N _______    _Y___HTN	                              Controlled    Y ___X____ N _______    _N___CAD	                                  Prior MI  _____CABG _____STENT  ______ EF _____    _Y___CHF                             Chronic __X___ Acute _____ EF ___65%___    _Y___Afib	                                  Current Rhythm ____NSR_____   Current anticoagulation ____NONE_____    _N___PPM/ICD                         Indication ___________  last Interrogated _________    _N___OSA	                              PAP  Type &Settings _____________    _N___Asthma/COPD	          Last Exac _______ Last Steroid use Date _______     _N___O2 dependent	          Reason ______________    _N___CKD or REYES	                  Stable Y _____  N ______    _N___Electrolyte Abn	          Type ___________     Stable Y ______   N _______    _N___Cirrhosis	                  Cause __________     Stable Y ______   N _______    _Y___GI Bleed                          Cause ___UNKNOWN_______     Stable Y __X____   N _______  -The pt is currently being evaluated for GIB that occurred in January 2022, she had a negative EGD/Colonoscopy performed out-pt and the plan was for pill endoscopy on March 31.   -Current Hgb is 9.7, the Hgb is January was 8.3    _Y___Anemia	                          Cause ____SEE ABOVE______     Stable Y ___X___   N _______    _Y___Transfusion                  Last date ____January 2022____      _N___ETOH Use	                  Last date________     Intervention __________________________    _N___Tobacco Abuse	          Last date ________    Intervention __________________________    _N___Drug Use	                 Type____________  Last dose _____ Intervention______________    ____Other                             Details_________________________________________________    Allergies    No Known Allergies    Intolerances      MEDICATIONS  (STANDING):  cholecalciferol 2000 Unit(s) Oral daily  enoxaparin Injectable 75 milliGRAM(s) SubCutaneous every 12 hours  furosemide    Tablet 20 milliGRAM(s) Oral <User Schedule>  lactated ringers. 1000 milliLiter(s) (100 mL/Hr) IV Continuous <Continuous>  metoprolol succinate ER 12.5 milliGRAM(s) Oral daily  pantoprazole    Tablet 40 milliGRAM(s) Oral before breakfast  prednisoLONE acetate 1% Suspension 1 Drop(s) Right EYE daily  simvastatin 20 milliGRAM(s) Oral at bedtime    MEDICATIONS  (PRN):      Duke Activity Status Index: Can the patient climb a flight of stairs or walk uphill?   ______ N   <4 METS   ___X____ Y > 4 METS  -As per pt, she does not push herself and will walk slowly because "she is getting old and wants to last." However, she denies any CP or SOB that limit her mobility.     Physical Exam:  Vital Signs Last 24 Hrs  T(C): 37.1 (16 Mar 2022 12:05), Max: 37.1 (16 Mar 2022 12:05)  T(F): 98.7 (16 Mar 2022 12:05), Max: 98.7 (16 Mar 2022 12:05)  HR: 60 (16 Mar 2022 12:05) (60 - 60)  BP: 126/58 (16 Mar 2022 12:05) (126/58 - 126/58)  BP(mean): --  RR: 16 (16 Mar 2022 12:05) (16 - 16)  SpO2: 95% (16 Mar 2022 12:05) (95% - 95%)    Physical exam shows pt in NAD. VSS, afebrile, not hypoxic on RA. A&Ox3. Head is atraumatic. Neuro shows a non-focal exam. Motor strength/sensation is intact. CTA B/L with no W/C/R. RRR, no M/G/R. ABD is soft and non-tender, normoactive BSs. Right LE with external rotation, knee immobilizer in place, pulses and sensation intact, and TTP over the hip. No rashes. Labs and radiology as above. QTc 453    LABS AND OTHER PERTINENT TESTS:                          9.7    10.30 )-----------( 133      ( 16 Mar 2022 14:41 )             30.9     03-16    137  |  102  |  31<H>  ----------------------------<  114<H>  4.9   |  24  |  0.8    Ca    9.4      16 Mar 2022 14:41    TPro  6.2  /  Alb  3.7  /  TBili  0.4  /  DBili  x   /  AST  15  /  ALT  9   /  AlkPhos  95  03-16    PT/INR - ( 16 Mar 2022 14:41 )   PT: 12.20 sec;   INR: 1.06 ratio         PTT - ( 16 Mar 2022 14:41 )  PTT:33.6 sec        Risk Assessment: (Use One)    [employer] NSQIP Surgical Risk Calculator http://riskcalculator.facs.org/      ACS Surgical Risk Assessment (NSQIP):  -Serious Complication 3.8% (Average 3.3%)  -Any Complication 4.8% (Average 3.9%)      ____X____  The patient is optimized for surgery/procedure and may proceed to the operating room as scheduled    _________The patient is NOT optimized for surgery/procedure and should not proceed to the operating room as scheduled      Recommendations for optimization:  -Pt is moderate-to-high risk for proposed surgical procedure. Pt is medically optimized  -Pt recently had GIB and source was never located, plan was for pill endoscopy as an out-pt. However, this was planned for March 31. She will need AC after the surgery as she is an extremely high risk for post-op thromboembolism. We had a lengthy conversation with the pt and family members at bedside regarding these potential risks and they would like to proceed w/ surgical correction understanding that she will need post-op AC which could cause a recurrence of her GIB.      Anticoagulation Recommendations:  -As per Ortho

## 2022-03-16 NOTE — ED PROVIDER NOTE - NS ED ROS FT
Review of Systems:  	•	CONSTITUTIONAL - no fever, no diaphoresis, no chills  	•	SKIN - no rash  	•	HEMATOLOGIC - no bleeding, no bruising  	•	EYES - no eye pain, no blurry vision  	•	ENT - no change in hearing, no sore throat, no ear pain or tinnitus  	•	RESPIRATORY - no shortness of breath, no cough  	•	CARDIAC - no chest pain, no palpitations  	•	GI - no abd pain, no nausea, no vomiting, no diarrhea, no constipation  	•	GENITO-URINARY - no discharge, no dysuria; no hematuria, no increased urinary frequency  	•	MUSCULOSKELETAL - R knee pain  	•	NEUROLOGIC - no weakness, no headache, no paresthesias, no LOC  	•	PSYCH - no anxiety, non suicidal, non homicidal, no hallucination, no depression

## 2022-03-16 NOTE — CONSULT NOTE ADULT - SUBJECTIVE AND OBJECTIVE BOX
TRAUMA ACTIVATION LEVEL:  CODE / ALERT  / CONSULT  ACTIVATED BY: EMS**  /  ED**  INTUBATED: YES** / NO**      MECHANISM OF INJURY:   [] Blunt     [] MVC	  [x] Fall	  [] Pedestrian Struck	  [] Motorcycle     [] Assault     [] Bicycle collision    [] Sports injury    [] Penetrating    [] Gun Shot Wound      [] Stab Wound    GCS: 15 	E: 4	V: 5	M: 6    HPI:  87yF w/ PMHx of chronic HFpEF, chronic aflutter, HTN, DLD , Bilateral knee replacements seen as Trauma Consult s/p trip and fall. She entering her garage and her foot got caught on door ledge and she fell down on her knees, with acute pain to her right knee. -HT -LOC -AC. She denies any other pain.  Trauma assessment in ED: ABCs intact , GCS 15 , AAOx3.    PAST MEDICAL & SURGICAL HISTORY:  High cholesterol    Kidney stone    Bladder polyp    Shingles  affecting right eye    Atrial flutter    H/O cystoscopy    H/O total knee replacement, right    H/O total knee replacement, left    Cancer of mouth    History of cardioversion        Allergies    No Known Allergies    Intolerances        Home Medications:  Lasix 20 mg oral tablet: 1 tab(s) orally 2 times a week then the following week take it 3 times per week (16 Jan 2022 10:29)  metoprolol succinate 25 mg oral tablet, extended release: 0.5 tab(s) orally Tuesday, Wednesday, Thursday, Saturday, Sunday (14 Jan 2022 11:01)  prednisoLONE acetate 1% ophthalmic suspension: 1 drop(s) in the right eye once a day (14 Jan 2022 11:04)      ROS: 10-system review is otherwise negative except HPI above.      Primary Survey:    A - airway intact  B - bilateral breath sounds and good chest rise  C - palpable pulses in all extremities  D - GCS 15 on arrival, GONZALEZ  Exposure obtained    Vital Signs Last 24 Hrs  T(C): 37.1 (16 Mar 2022 12:05), Max: 37.1 (16 Mar 2022 12:05)  T(F): 98.7 (16 Mar 2022 12:05), Max: 98.7 (16 Mar 2022 12:05)  HR: 60 (16 Mar 2022 12:05) (60 - 60)  BP: 126/58 (16 Mar 2022 12:05) (126/58 - 126/58)  BP(mean): --  RR: 16 (16 Mar 2022 12:05) (16 - 16)  SpO2: 95% (16 Mar 2022 12:05) (95% - 95%)    Secondary Survey:   General: NAD  HEENT: Normocephalic, atraumatic, EOMI, PEERLA. no scalp lacerations   Neck: Soft, midline trachea. no c-spine tenderness  Chest: No chest wall tenderness, no subcutaneous emphysema   Cardiac: S1, S2, RRR  Respiratory: Bilateral breath sounds, clear and equal bilaterally  Abdomen: Soft, non-distended, non-tender, no rebound, no guarding.  Groin: Normal appearing, pelvis stable   Ext:  Right knee pain and swelling. Compartments soft. Palpable Radial b/l UE, b/l DP palpable in LE.   Back: No T/L/S spine tenderness, No palpable runoff/stepoff/deformity    ACCESS / DEVICES:  [x] Peripheral IV  [ ] Central Venous Line	[ ] R	[ ] L	[ ] IJ	[ ] Fem	[ ] SC	Placed:   [ ] Arterial Line		[ ] R	[ ] L	[ ] Fem	[ ] Rad	[ ] Ax	Placed:   [ ] PICC:					[ ] Mediport  [ ] Urinary Catheter,  Date Placed:   [ ] Chest tube: [ ] Right, [ ] Left  [ ] NILE/Lyle Drains    Labs:  CAPILLARY BLOOD GLUCOSE       03-16    137  |  102  |  31<H>  ----------------------------<  114<H>  4.9   |  24  |  0.8      Calcium, Total Serum: 9.4 mg/dL (03-16-22 @ 14:41)      LFTs:             6.2  | 0.4  | 15       ------------------[95      ( 16 Mar 2022 14:41 )  3.7  | x    | 9           Lipase:x      Amylase:x             Coags:     12.20  ----< 1.06    ( 16 Mar 2022 14:41 )     33.6          RADIOLOGY & ADDITIONAL STUDIES:  ---------------------------------------------------------------------------------------  Right distal femur fx wet read per ED  CXR wet read as per ED negative

## 2022-03-16 NOTE — ED ADULT NURSE NOTE - NSIMPLEMENTINTERV_GEN_ALL_ED
Implemented All Fall with Harm Risk Interventions:  Halfway to call system. Call bell, personal items and telephone within reach. Instruct patient to call for assistance. Room bathroom lighting operational. Non-slip footwear when patient is off stretcher. Physically safe environment: no spills, clutter or unnecessary equipment. Stretcher in lowest position, wheels locked, appropriate side rails in place. Provide visual cue, wrist band, yellow gown, etc. Monitor gait and stability. Monitor for mental status changes and reorient to person, place, and time. Review medications for side effects contributing to fall risk. Reinforce activity limits and safety measures with patient and family. Provide visual clues: red socks.

## 2022-03-17 LAB
ALBUMIN SERPL ELPH-MCNC: 3.1 G/DL — LOW (ref 3.5–5.2)
ALP SERPL-CCNC: 81 U/L — SIGNIFICANT CHANGE UP (ref 30–115)
ALT FLD-CCNC: 7 U/L — SIGNIFICANT CHANGE UP (ref 0–41)
ANION GAP SERPL CALC-SCNC: 9 MMOL/L — SIGNIFICANT CHANGE UP (ref 7–14)
APTT BLD: 41.3 SEC — HIGH (ref 27–39.2)
AST SERPL-CCNC: 12 U/L — SIGNIFICANT CHANGE UP (ref 0–41)
BASOPHILS # BLD AUTO: 0.04 K/UL — SIGNIFICANT CHANGE UP (ref 0–0.2)
BASOPHILS NFR BLD AUTO: 0.8 % — SIGNIFICANT CHANGE UP (ref 0–1)
BILIRUB SERPL-MCNC: 0.5 MG/DL — SIGNIFICANT CHANGE UP (ref 0.2–1.2)
BUN SERPL-MCNC: 34 MG/DL — HIGH (ref 10–20)
CALCIUM SERPL-MCNC: 8.5 MG/DL — SIGNIFICANT CHANGE UP (ref 8.5–10.1)
CHLORIDE SERPL-SCNC: 102 MMOL/L — SIGNIFICANT CHANGE UP (ref 98–110)
CO2 SERPL-SCNC: 25 MMOL/L — SIGNIFICANT CHANGE UP (ref 17–32)
CREAT SERPL-MCNC: 1 MG/DL — SIGNIFICANT CHANGE UP (ref 0.7–1.5)
EGFR: 55 ML/MIN/1.73M2 — LOW
EOSINOPHIL # BLD AUTO: 0.04 K/UL — SIGNIFICANT CHANGE UP (ref 0–0.7)
EOSINOPHIL NFR BLD AUTO: 0.8 % — SIGNIFICANT CHANGE UP (ref 0–8)
FERRITIN SERPL-MCNC: 195 NG/ML — HIGH (ref 15–150)
FOLATE SERPL-MCNC: 12.3 NG/ML — SIGNIFICANT CHANGE UP
GLUCOSE SERPL-MCNC: 106 MG/DL — HIGH (ref 70–99)
HCT VFR BLD CALC: 25.3 % — LOW (ref 37–47)
HGB BLD-MCNC: 7.9 G/DL — LOW (ref 12–16)
IMM GRANULOCYTES NFR BLD AUTO: 0.4 % — HIGH (ref 0.1–0.3)
INR BLD: 1.22 RATIO — SIGNIFICANT CHANGE UP (ref 0.65–1.3)
LYMPHOCYTES # BLD AUTO: 1.41 K/UL — SIGNIFICANT CHANGE UP (ref 1.2–3.4)
LYMPHOCYTES # BLD AUTO: 26.7 % — SIGNIFICANT CHANGE UP (ref 20.5–51.1)
MAGNESIUM SERPL-MCNC: 1.9 MG/DL — SIGNIFICANT CHANGE UP (ref 1.8–2.4)
MCHC RBC-ENTMCNC: 27.6 PG — SIGNIFICANT CHANGE UP (ref 27–31)
MCHC RBC-ENTMCNC: 31.2 G/DL — LOW (ref 32–37)
MCV RBC AUTO: 88.5 FL — SIGNIFICANT CHANGE UP (ref 81–99)
MONOCYTES # BLD AUTO: 0.66 K/UL — HIGH (ref 0.1–0.6)
MONOCYTES NFR BLD AUTO: 12.5 % — HIGH (ref 1.7–9.3)
NEUTROPHILS # BLD AUTO: 3.12 K/UL — SIGNIFICANT CHANGE UP (ref 1.4–6.5)
NEUTROPHILS NFR BLD AUTO: 58.8 % — SIGNIFICANT CHANGE UP (ref 42.2–75.2)
NRBC # BLD: 0 /100 WBCS — SIGNIFICANT CHANGE UP (ref 0–0)
PHOSPHATE SERPL-MCNC: 3.6 MG/DL — SIGNIFICANT CHANGE UP (ref 2.1–4.9)
PLATELET # BLD AUTO: 109 K/UL — LOW (ref 130–400)
POTASSIUM SERPL-MCNC: 4.9 MMOL/L — SIGNIFICANT CHANGE UP (ref 3.5–5)
POTASSIUM SERPL-SCNC: 4.9 MMOL/L — SIGNIFICANT CHANGE UP (ref 3.5–5)
PROT SERPL-MCNC: 4.9 G/DL — LOW (ref 6–8)
PROTHROM AB SERPL-ACNC: 14 SEC — HIGH (ref 9.95–12.87)
RBC # BLD: 2.86 M/UL — LOW (ref 4.2–5.4)
RBC # FLD: 25.1 % — HIGH (ref 11.5–14.5)
SODIUM SERPL-SCNC: 136 MMOL/L — SIGNIFICANT CHANGE UP (ref 135–146)
TSH SERPL-MCNC: 1.13 UIU/ML — SIGNIFICANT CHANGE UP (ref 0.27–4.2)
VIT B12 SERPL-MCNC: 274 PG/ML — SIGNIFICANT CHANGE UP (ref 232–1245)
WBC # BLD: 5.29 K/UL — SIGNIFICANT CHANGE UP (ref 4.8–10.8)
WBC # FLD AUTO: 5.29 K/UL — SIGNIFICANT CHANGE UP (ref 4.8–10.8)

## 2022-03-17 PROCEDURE — 99222 1ST HOSP IP/OBS MODERATE 55: CPT

## 2022-03-17 PROCEDURE — 73700 CT LOWER EXTREMITY W/O DYE: CPT | Mod: 26,RT

## 2022-03-17 PROCEDURE — 99233 SBSQ HOSP IP/OBS HIGH 50: CPT

## 2022-03-17 RX ORDER — FUROSEMIDE 40 MG
20 TABLET ORAL
Refills: 0 | Status: DISCONTINUED | OUTPATIENT
Start: 2022-03-17 | End: 2022-03-20

## 2022-03-17 RX ORDER — ENOXAPARIN SODIUM 100 MG/ML
40 INJECTION SUBCUTANEOUS EVERY 24 HOURS
Refills: 0 | Status: DISCONTINUED | OUTPATIENT
Start: 2022-03-18 | End: 2022-03-20

## 2022-03-17 RX ADMIN — MORPHINE SULFATE 2 MILLIGRAM(S): 50 CAPSULE, EXTENDED RELEASE ORAL at 08:37

## 2022-03-17 RX ADMIN — SIMVASTATIN 20 MILLIGRAM(S): 20 TABLET, FILM COATED ORAL at 22:24

## 2022-03-17 RX ADMIN — SENNA PLUS 2 TABLET(S): 8.6 TABLET ORAL at 22:24

## 2022-03-17 RX ADMIN — Medication 12.5 MILLIGRAM(S): at 07:15

## 2022-03-17 RX ADMIN — PANTOPRAZOLE SODIUM 40 MILLIGRAM(S): 20 TABLET, DELAYED RELEASE ORAL at 07:15

## 2022-03-17 RX ADMIN — ENOXAPARIN SODIUM 75 MILLIGRAM(S): 100 INJECTION SUBCUTANEOUS at 06:14

## 2022-03-17 RX ADMIN — MORPHINE SULFATE 2 MILLIGRAM(S): 50 CAPSULE, EXTENDED RELEASE ORAL at 20:17

## 2022-03-17 RX ADMIN — Medication 2000 UNIT(S): at 13:23

## 2022-03-17 RX ADMIN — Medication 20 MILLIGRAM(S): at 07:14

## 2022-03-17 RX ADMIN — SODIUM CHLORIDE 100 MILLILITER(S): 9 INJECTION, SOLUTION INTRAVENOUS at 20:26

## 2022-03-17 NOTE — CONSULT NOTE ADULT - SUBJECTIVE AND OBJECTIVE BOX
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 Pt lives with her daughter and went to her old home to collect some documents and tripped in the doorway and landed on her right knee. Had immediate pain, unable to ambulate afterwards. Pt normally ambulates w/ a cane. Pt underwent left TKA by Dr. Morton in 2007 and a right TKA by Dr. Perkins at [facility] approx 8 years ago. right PATRICK Type II periprosthetic distal femur fracture. Pt placed in a knee immobilizer.     ED COURSE: As above, HD stable (16 Mar 2022 20:55)      PAST MEDICAL & SURGICAL HISTORY  High cholesterol    Kidney stone    Bladder polyp    Shingles  affecting right eye    Atrial flutter    H/O cystoscopy    H/O total knee replacement, right    H/O total knee replacement, left    Cancer of mouth    History of cardioversion        FAMILY HISTORY:  FAMILY HISTORY:      SOCIAL HISTORY:  []smoker  []Alcohol  []Drug    ALLERGIES:  No Known Allergies      MEDICATIONS:  MEDICATIONS  (STANDING):  cholecalciferol 2000 Unit(s) Oral daily  enoxaparin Injectable 75 milliGRAM(s) SubCutaneous every 12 hours  furosemide    Tablet 20 milliGRAM(s) Oral <User Schedule>  lactated ringers. 1000 milliLiter(s) (100 mL/Hr) IV Continuous <Continuous>  metoprolol succinate ER 12.5 milliGRAM(s) Oral daily  pantoprazole    Tablet 40 milliGRAM(s) Oral before breakfast  prednisoLONE acetate 1% Suspension 1 Drop(s) Right EYE daily  senna 2 Tablet(s) Oral at bedtime  simvastatin 20 milliGRAM(s) Oral at bedtime    MEDICATIONS  (PRN):  morphine  - Injectable 2 milliGRAM(s) IV Push every 4 hours PRN Moderate Pain (4 - 6)      HOME MEDICATIONS:  Home Medications:  Lasix 20 mg oral tablet: 1 tab(s) orally 2 times a week then the following week take it 3 times per week (16 Mar 2022 21:45)  metoprolol succinate 25 mg oral tablet, extended release: 0.5 tab(s) orally once a day (16 Mar 2022 21:45)  prednisoLONE acetate 1% ophthalmic suspension: 1 drop(s) in the right eye once a day (16 Mar 2022 21:45)  Vitamin D2 50 mcg (2000 intl units) oral capsule: 1 cap(s) orally once a day (16 Mar 2022 21:45)      VITALS:   T(F): 97.2 (03-17 @ 06:18), Max: 98.7 (03-16 @ 12:05)  HR: 70 (03-17 @ 06:18) (60 - 72)  BP: 96/50 (03-17 @ 06:18) (84/41 - 126/58)  BP(mean): 71 (03-16 @ 23:19) (58 - 71)  RR: 18 (03-17 @ 06:18) (16 - 18)  SpO2: 99% (03-16 @ 23:19) (95% - 99%)    I&O's Summary      REVIEW OF SYSTEMS:  CONSTITUTIONAL: No weakness, fevers or chills  EYES: No visual changes  ENT: No vertigo or throat pain   NECK: No pain or stiffness  RESPIRATORY: No cough, wheezing, hemoptysis; No shortness of breath  CARDIOVASCULAR: No chest pain or palpitations  GASTROINTESTINAL: No abdominal or epigastric pain. No nausea, vomiting, or hematemesis; No diarrhea or constipation. No melena or hematochezia.  GENITOURINARY: No dysuria, frequency or hematuria  NEUROLOGICAL: No numbness or weakness  SKIN: No itching, no rashes  MSK: no    PHYSICAL EXAM:  NEURO: patient is awake , alert and oriented  GEN: Not in acute distress  NECK: no thyroid enlargement, no JVD  LUNGS: Clear to auscultation bilaterally   CARDIOVASCULAR: S1/S2 present, RRR , pansystolic murmur  ABD: Soft, non-tender, non-distended, +BS  EXT: No NIALL  SKIN: Intact    LABS:                        7.9    5.29  )-----------( x        ( 17 Mar 2022 04:30 )             25.3     03-17    136  |  102  |  34<H>  ----------------------------<  106<H>  4.9   |  25  |  1.0    Ca    8.5      17 Mar 2022 04:30  Phos  3.6     03-17  Mg     1.9     03-17    TPro  4.9<L>  /  Alb  3.1<L>  /  TBili  0.5  /  DBili  x   /  AST  12  /  ALT  7   /  AlkPhos  81  03-17    PT/INR - ( 17 Mar 2022 04:30 )   PT: 14.00 sec;   INR: 1.22 ratio         PTT - ( 17 Mar 2022 04:30 )  PTT:41.3 sec          Troponin trend:            RADIOLOGY:  -CXR:  < from: Xray Tibia + Fibula 2 Views, Right (03.16.22 @ 18:50) >  1.  Periprosthetic fracture of the right distal femur complete just   proximal to the total knee arthroplasty with approximately 2.6 cm of   lateral displacement and approximately 3 cm of bony overlap.  2.  Knee joint arthroplasty appears in anatomic alignment without   dislocation.  < end of copied text >    -TTE:  < from: TTE Echo Complete w/o Contrast w/ Doppler (05.02.21 @ 09:36) >  1. LV Ejection Fraction by Pinto's Method with a biplane EF of 65 %.   2. Normal global left ventricular systolic function.   3. Moderate left ventricular hypertrophy.   4. Spectral Doppler shows pseudonormal pattern of left ventricular myocardial filling (Grade II diastolic dysfunction).   5. Severely enlarged left atrium.   6. Severely enlarged right atrium.   7. Sclerotic aortic valve with normal opening.   8. Mild aortic regurgitation.   9. Thickening and calcification of the anterior and posterior mitral leaflets.  10. Mild-to-moderate mitral regurgitation.  11. Moderate tricuspid regurgitation.  12. Estimated pulmonary artery systolic pressure is 36.7 mmHg assuming a right atrial pressure of 15 mmHg, which is consistent with borderline pulmonary hypertension.    < end of copied text >  < from: TTE Echo Complete w/o Contrast w/ Doppler (05.02.21 @ 09:36) >  1. LV Ejection Fraction by Pinto's Method with a biplane EF of 65 %.   2. Normal global left ventricular systolic function.   3. Moderate left ventricular hypertrophy.   4. Spectral Doppler shows pseudonormal pattern of left ventricular myocardial filling (Grade II diastolic dysfunction).   5. Severely enlarged left atrium.   6. Severely enlarged right atrium.   7. Sclerotic aortic valve with normal opening.   8. Mild aortic regurgitation.   9. Thickening and calcification of the anterior and posterior mitral leaflets.  10. Mild-to-moderate mitral regurgitation.  11. Moderate tricuspid regurgitation.  12. Estimated pulmonary artery systolic pressure is 36.7 mmHg assuming a right atrial pressure of 15 mmHg, which is consistent with borderline pulmonary hypertension.  < end of copied text >    -CCTA:  -STRESS TEST:  -CATHETERIZATION:    ECG:  < from: 12 Lead ECG (03.16.22 @ 14:25) >  Ventricular Rate 58 BPM  Atrial Rate 58 BPM  P-R Interval 134 ms  QRS Duration 92 ms  Q-T Interval 462 ms    QTC Calculation(Bazett) 453 ms  P Axis 82 degrees  R Axis 95 degrees  T Axis 40 degrees    Diagnosis Line Sinus bradycardia  Rightward axis  Incomplete right bundle branch block  Cannot rule out Anterior infarct , age undetermined  Abnormal ECG  < end of copied text >    TELEMETRY EVENTS:  None HPI:  HPI: 86 F PMH HFpEF, Afib on Eliquis, Anemia suspected to be secondary to GIB ( but never proven s/p neg EGD and C-scope), HTN, and HLD, hx of tonsillar ca s/p resection w/obturator in place to cover hole in pallate, BIBEMS s/p mechanical fall at home. Pt lives with her daughter and went to her old home to collect some documents and tripped in the doorway and landed on her right knee. Had immediate pain, unable to ambulate afterwards. Pt normally ambulates w/ a cane. Pt underwent left TKA by Dr. Morton in 2007 and a right TKA by Dr. Perkins at [facility] approx 8 years ago. right PATRICK Type II periprosthetic distal femur fracture. Pt placed in a knee immobilizer.       PAST MEDICAL & SURGICAL HISTORY  High cholesterol    Kidney stone    Bladder polyp    Shingles  affecting right eye    Atrial flutter    H/O cystoscopy    H/O total knee replacement, right    H/O total knee replacement, left    Cancer of mouth    History of cardioversion        ALLERGIES:  No Known Allergies      MEDICATIONS:  MEDICATIONS  (STANDING):  cholecalciferol 2000 Unit(s) Oral daily  enoxaparin Injectable 75 milliGRAM(s) SubCutaneous every 12 hours  furosemide    Tablet 20 milliGRAM(s) Oral <User Schedule>  lactated ringers. 1000 milliLiter(s) (100 mL/Hr) IV Continuous <Continuous>  metoprolol succinate ER 12.5 milliGRAM(s) Oral daily  pantoprazole    Tablet 40 milliGRAM(s) Oral before breakfast  prednisoLONE acetate 1% Suspension 1 Drop(s) Right EYE daily  senna 2 Tablet(s) Oral at bedtime  simvastatin 20 milliGRAM(s) Oral at bedtime    MEDICATIONS  (PRN):  morphine  - Injectable 2 milliGRAM(s) IV Push every 4 hours PRN Moderate Pain (4 - 6)      HOME MEDICATIONS:  Home Medications:  Lasix 20 mg oral tablet: 1 tab(s) orally 2 times a week then the following week take it 3 times per week (16 Mar 2022 21:45)  metoprolol succinate 25 mg oral tablet, extended release: 0.5 tab(s) orally once a day (16 Mar 2022 21:45)  prednisoLONE acetate 1% ophthalmic suspension: 1 drop(s) in the right eye once a day (16 Mar 2022 21:45)  Vitamin D2 50 mcg (2000 intl units) oral capsule: 1 cap(s) orally once a day (16 Mar 2022 21:45)      VITALS:   T(F): 97.2 (03-17 @ 06:18), Max: 98.7 (03-16 @ 12:05)  HR: 70 (03-17 @ 06:18) (60 - 72)  BP: 96/50 (03-17 @ 06:18) (84/41 - 126/58)  BP(mean): 71 (03-16 @ 23:19) (58 - 71)  RR: 18 (03-17 @ 06:18) (16 - 18)  SpO2: 99% (03-16 @ 23:19) (95% - 99%)      REVIEW OF SYSTEMS:  CONSTITUTIONAL: No weakness, fevers or chills  EYES: No visual changes  ENT: No vertigo or throat pain   NECK: No pain or stiffness  RESPIRATORY: No cough, wheezing, hemoptysis; No shortness of breath  CARDIOVASCULAR: No chest pain or palpitations  GASTROINTESTINAL: No abdominal or epigastric pain. No nausea, vomiting, or hematemesis; No diarrhea or constipation. No melena or hematochezia.  GENITOURINARY: No dysuria, frequency or hematuria  NEUROLOGICAL: No numbness or weakness  SKIN: No itching, no rashes      PHYSICAL EXAM:  NEURO: patient is awake , alert and oriented  GEN: Not in acute distress  NECK: no thyroid enlargement, no JVD  LUNGS: Clear to auscultation bilaterally   CARDIOVASCULAR: S1/S2 present, RRR , pansystolic murmur  ABD: Soft, non-tender, non-distended, +BS  EXT: No NIALL  SKIN: Intact    LABS:                        7.9    5.29  )-----------( x        ( 17 Mar 2022 04:30 )             25.3     03-17    136  |  102  |  34<H>  ----------------------------<  106<H>  4.9   |  25  |  1.0    Ca    8.5      17 Mar 2022 04:30  Phos  3.6     03-17  Mg     1.9     03-17    TPro  4.9<L>  /  Alb  3.1<L>  /  TBili  0.5  /  DBili  x   /  AST  12  /  ALT  7   /  AlkPhos  81  03-17    PT/INR - ( 17 Mar 2022 04:30 )   PT: 14.00 sec;   INR: 1.22 ratio      PTT - ( 17 Mar 2022 04:30 )  PTT:41.3 sec          RADIOLOGY:    < from: Xray Tibia + Fibula 2 Views, Right (03.16.22 @ 18:50) >  1.  Periprosthetic fracture of the right distal femur complete just   proximal to the total knee arthroplasty with approximately 2.6 cm of   lateral displacement and approximately 3 cm of bony overlap.  2.  Knee joint arthroplasty appears in anatomic alignment without   dislocation.  < end of copied text >      < from: TTE Echo Complete w/o Contrast w/ Doppler (05.02.21 @ 09:36) >  1. LV Ejection Fraction by Pinto's Method with a biplane EF of 65 %.   2. Normal global left ventricular systolic function.   3. Moderate left ventricular hypertrophy.   4. Spectral Doppler shows pseudonormal pattern of left ventricular myocardial filling (Grade II diastolic dysfunction).   5. Severely enlarged left atrium.   6. Severely enlarged right atrium.   7. Sclerotic aortic valve with normal opening.   8. Mild aortic regurgitation.   9. Thickening and calcification of the anterior and posterior mitral leaflets.  10. Mild-to-moderate mitral regurgitation.  11. Moderate tricuspid regurgitation.  12. Estimated pulmonary artery systolic pressure is 36.7 mmHg assuming a right atrial pressure of 15 mmHg, which is consistent with borderline pulmonary hypertension.        ECG:  < from: 12 Lead ECG (03.16.22 @ 14:25) >  Ventricular Rate 58 BPM  Atrial Rate 58 BPM  P-R Interval 134 ms  QRS Duration 92 ms  Q-T Interval 462 ms    QTC Calculation(Bazett) 453 ms  P Axis 82 degrees  R Axis 95 degrees  T Axis 40 degrees    Diagnosis Line Sinus bradycardia  Rightward axis  Incomplete right bundle branch block  Cannot rule out Anterior infarct , age undetermined  Abnormal ECG  < end of copied text >    TELEMETRY EVENTS:  None HPI:  HPI: 86 F PMH HFpEF, Afib on Eliquis, Anemia suspected to be secondary to GIB (but never proven s/p neg EGD and C-scope), HTN, and HLD, hx of tonsillar ca s/p resection w/obturator in place to cover hole in pallate, BIBEMS s/p mechanical fall at home. Pt lives with her daughter and went to her old home to collect some documents and tripped in the doorway and landed on her right knee. Had immediate pain, unable to ambulate afterwards. Pt normally ambulates w/ a cane. Pt underwent left TKA by Dr. Morton in 2007 and a right TKA by Dr. Perkins at [facility] approx 8 years ago. right PATRICK Type II periprosthetic distal femur fracture. Pt placed in a knee immobilizer.       PAST MEDICAL & SURGICAL HISTORY  High cholesterol    Kidney stone    Bladder polyp    Shingles  affecting right eye    Atrial flutter    H/O cystoscopy    H/O total knee replacement, right    H/O total knee replacement, left    Cancer of mouth    History of cardioversion      No pertinent family history of premature CAD in first degree relatives  SOCIAL HISTORY: Denies EtOH, smoking or drug use    ALLERGIES:  No Known Allergies      MEDICATIONS:  MEDICATIONS  (STANDING):  cholecalciferol 2000 Unit(s) Oral daily  enoxaparin Injectable 75 milliGRAM(s) SubCutaneous every 12 hours  furosemide    Tablet 20 milliGRAM(s) Oral <User Schedule>  lactated ringers. 1000 milliLiter(s) (100 mL/Hr) IV Continuous <Continuous>  metoprolol succinate ER 12.5 milliGRAM(s) Oral daily  pantoprazole    Tablet 40 milliGRAM(s) Oral before breakfast  prednisoLONE acetate 1% Suspension 1 Drop(s) Right EYE daily  senna 2 Tablet(s) Oral at bedtime  simvastatin 20 milliGRAM(s) Oral at bedtime    MEDICATIONS  (PRN):  morphine  - Injectable 2 milliGRAM(s) IV Push every 4 hours PRN Moderate Pain (4 - 6)      HOME MEDICATIONS:  Home Medications:  Lasix 20 mg oral tablet: 1 tab(s) orally 2 times a week then the following week take it 3 times per week (16 Mar 2022 21:45)  metoprolol succinate 25 mg oral tablet, extended release: 0.5 tab(s) orally once a day (16 Mar 2022 21:45)  prednisoLONE acetate 1% ophthalmic suspension: 1 drop(s) in the right eye once a day (16 Mar 2022 21:45)  Vitamin D2 50 mcg (2000 intl units) oral capsule: 1 cap(s) orally once a day (16 Mar 2022 21:45)      VITALS:   T(F): 97.2 (03-17 @ 06:18), Max: 98.7 (03-16 @ 12:05)  HR: 70 (03-17 @ 06:18) (60 - 72)  BP: 96/50 (03-17 @ 06:18) (84/41 - 126/58)  BP(mean): 71 (03-16 @ 23:19) (58 - 71)  RR: 18 (03-17 @ 06:18) (16 - 18)  SpO2: 99% (03-16 @ 23:19) (95% - 99%)      REVIEW OF SYSTEMS:  CONSTITUTIONAL: No weakness, fevers or chills  EYES: No visual changes  ENT: No vertigo or throat pain   NECK: No pain or stiffness  RESPIRATORY: No cough, wheezing, hemoptysis; No shortness of breath  CARDIOVASCULAR: No chest pain or palpitations  GASTROINTESTINAL: No abdominal or epigastric pain. No nausea, vomiting, or hematemesis; No diarrhea or constipation. No melena or hematochezia.  GENITOURINARY: No dysuria, frequency or hematuria  NEUROLOGICAL: No numbness or weakness  SKIN: No itching, no rashes      PHYSICAL EXAM:  NEURO: patient is awake, alert and oriented  GEN: Not in acute distress  NECK: no thyroid enlargement, no JVD  LUNGS: Clear to auscultation bilaterally   CARDIOVASCULAR: S1/S2 present, RRR , pansystolic murmur  ABD: Soft, non-tender, non-distended, +BS  EXT: No NIALL  SKIN: Intact      LABS:                        7.9    5.29  )-----------( x        ( 17 Mar 2022 04:30 )             25.3     03-17    136  |  102  |  34<H>  ----------------------------<  106<H>  4.9   |  25  |  1.0    Ca    8.5      17 Mar 2022 04:30  Phos  3.6     03-17  Mg     1.9     03-17    TPro  4.9<L>  /  Alb  3.1<L>  /  TBili  0.5  /  DBili  x   /  AST  12  /  ALT  7   /  AlkPhos  81  03-17    PT/INR - ( 17 Mar 2022 04:30 )   PT: 14.00 sec;   INR: 1.22 ratio      PTT - ( 17 Mar 2022 04:30 )  PTT:41.3 sec          RADIOLOGY:    < from: Xray Tibia + Fibula 2 Views, Right (03.16.22 @ 18:50) >  1.  Periprosthetic fracture of the right distal femur complete just   proximal to the total knee arthroplasty with approximately 2.6 cm of   lateral displacement and approximately 3 cm of bony overlap.  2.  Knee joint arthroplasty appears in anatomic alignment without   dislocation.  < end of copied text >      < from: TTE Echo Complete w/o Contrast w/ Doppler (05.02.21 @ 09:36) >  1. LV Ejection Fraction by Pinto's Method with a biplane EF of 65 %.   2. Normal global left ventricular systolic function.   3. Moderate left ventricular hypertrophy.   4. Spectral Doppler shows pseudonormal pattern of left ventricular myocardial filling (Grade II diastolic dysfunction).   5. Severely enlarged left atrium.   6. Severely enlarged right atrium.   7. Sclerotic aortic valve with normal opening.   8. Mild aortic regurgitation.   9. Thickening and calcification of the anterior and posterior mitral leaflets.  10. Mild-to-moderate mitral regurgitation.  11. Moderate tricuspid regurgitation.  12. Estimated pulmonary artery systolic pressure is 36.7 mmHg assuming a right atrial pressure of 15 mmHg, which is consistent with borderline pulmonary hypertension.        < from: 12 Lead ECG (03.16.22 @ 14:25) >  Ventricular Rate 58 BPM  Atrial Rate 58 BPM  P-R Interval 134 ms  QRS Duration 92 ms  Q-T Interval 462 ms    QTC Calculation(Bazett) 453 ms  P Axis 82 degrees  R Axis 95 degrees  T Axis 40 degrees    Diagnosis Line Sinus bradycardia  Rightward axis  Incomplete right bundle branch block  Cannot rule out Anterior infarct , age undetermined  Abnormal ECG      TELEMETRY EVENTS:  None

## 2022-03-17 NOTE — PROGRESS NOTE ADULT - SUBJECTIVE AND OBJECTIVE BOX
LIZA HERNANDEZ 87y Female  MRN#: [id number]   Hospital Day: 1d    SUBJECTIVE  Patient is a 87y old Female who presents with a chief complaint of Currently admitted to medicine with the primary diagnosis of Displaced fracture of right femur      INTERVAL HPI AND OVERNIGHT EVENTS:  Patient was examined and seen at bedside. This morning she is resting comfortably in bed and 3/10 RT LEG PAN     REVIEW OF SYMPTOMS:  CONSTITUTIONAL: No weakness, fevers or chills; No headaches  EYES: No visual changes, eye pain, or discharge  ENT: No vertigo; No ear pain or change in hearing; No sore throat or difficulty swallowing  NECK: No pain or stiffness  RESPIRATORY: No cough, wheezing, or hemoptysis; No shortness of breath  CARDIOVASCULAR: No chest pain or palpitations  GASTROINTESTINAL: No abdominal or epigastric pain; No nausea, vomiting, or hematemesis; No diarrhea or constipation; No melena or hematochezia  GENITOURINARY: No dysuria, frequency or hematuria  MUSCULOSKELETAL: RT LEG PAIN AS ABOVE  NEUROLOGICAL: No numbness or weakness  SKIN: No itching or rashes    OBJECTIVE  PAST MEDICAL & SURGICAL HISTORY  High cholesterol    Kidney stone    Bladder polyp    Shingles  affecting right eye    Atrial flutter    H/O cystoscopy    H/O total knee replacement, right    H/O total knee replacement, left    Cancer of mouth    History of cardioversion      ALLERGIES:  No Known Allergies    MEDICATIONS:  STANDING MEDICATIONS  cholecalciferol 2000 Unit(s) Oral daily  furosemide    Tablet 20 milliGRAM(s) Oral <User Schedule>  lactated ringers. 1000 milliLiter(s) IV Continuous <Continuous>  metoprolol succinate ER 12.5 milliGRAM(s) Oral daily  pantoprazole    Tablet 40 milliGRAM(s) Oral before breakfast  prednisoLONE acetate 1% Suspension 1 Drop(s) Right EYE daily  senna 2 Tablet(s) Oral at bedtime  simvastatin 20 milliGRAM(s) Oral at bedtime    PRN MEDICATIONS  morphine  - Injectable 2 milliGRAM(s) IV Push every 4 hours PRN      VITAL SIGNS: Last 24 Hours  T(C): 37.1 (17 Mar 2022 12:48), Max: 37.1 (17 Mar 2022 12:48)  T(F): 98.8 (17 Mar 2022 12:48), Max: 98.8 (17 Mar 2022 12:48)  HR: 77 (17 Mar 2022 12:48) (70 - 77)  BP: 137/61 (17 Mar 2022 12:48) (84/41 - 137/61)  BP(mean): 71 (16 Mar 2022 23:19) (58 - 71)  RR: 18 (17 Mar 2022 12:48) (18 - 18)  SpO2: 99% (16 Mar 2022 23:19) (98% - 99%)    LABS:                        7.9    5.29  )-----------( 109      ( 17 Mar 2022 04:30 )             25.3     03-17    136  |  102  |  34<H>  ----------------------------<  106<H>  4.9   |  25  |  1.0    Ca    8.5      17 Mar 2022 04:30  Phos  3.6     03-17  Mg     1.9     03-17    TPro  4.9<L>  /  Alb  3.1<L>  /  TBili  0.5  /  DBili  x   /  AST  12  /  ALT  7   /  AlkPhos  81  03-17    PT/INR - ( 17 Mar 2022 04:30 )   PT: 14.00 sec;   INR: 1.22 ratio         PTT - ( 17 Mar 2022 04:30 )  PTT:41.3 sec              RADIOLOGY:  < from: Xray Chest 1 View-PORTABLE IMMEDIATE (Xray Chest 1 View-PORTABLE IMMEDIATE .) (03.16.22 @ 18:51) >  IMPRESSION:    No focal consolidation, pneumothorax or pleural effusion.    Stable cardiomediastinal silhouette.    Unchanged osseous structures.    < end of copied text >  < from: Xray Tibia + Fibula 2 Views, Right (03.16.22 @ 18:50) >  IMPRESSION:  1.  Periprosthetic fracture of the right distal femur complete just   proximal to the total knee arthroplasty with approximately 2.6 cm of   lateral displacement and approximately 3 cm of bony overlap.  2.  Knee joint arthroplasty appears in anatomic alignment without   dislocation.    < end of copied text >      PHYSICAL EXAM:  CONSTITUTIONAL: No acute distress, PALE ELDERLY WOMAN, RT LEG IN BRACE, POLITE CONVERSATIONAL   HEAD: Atraumatic, normocephalic  EYES: EOM intact, RT cloudy conjunctiva and iris   ENT: Supple, no masses, no thyromegaly,; moist mucous membranes  PULMONARY: Clear to auscultation bilaterally; no wheezes, rales, or rhonchi  CARDIOVASCULAR: Regular rate and rhythm; no murmurs, rubs, or gallops  GASTROINTESTINAL: Soft, non-tender, non-distended; bowel sounds present  MUSCULOSKELETAL: BL LE EDEMA LEFT >RT +1  NEUROLOGY: A&OX3  SKIN: No rashes or lesions; warm and dry

## 2022-03-17 NOTE — CHART NOTE - NSCHARTNOTEFT_GEN_A_CORE
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 Patient lives with her daughter and tripped in the doorway or her garage, subsequently landing on her right knee. Patient had immediate pain and was unable to ambulate afterwards. Patient normally ambulates w/ a cane. Patient seen as a Trauma Consult - -LOC -AC. Trauma assessment in ED: ABCs intact , GCS 15 , AAOx3. On trauma workup, patient has right PATRICK Type II periprosthetic distal femur fracture.     Patient seen and examined today at the bedside. Patient placed in a knee immobilizer. No complaints of pain other than her right knee which she states is controlled with her pain medication regimen. No other complaints at this time.       PHYSICAL EXAM:  General: NAD  HEENT: Normocephalic, atraumatic, no scalp lacerations   Neck: Soft, midline trachea. no c-spine tenderness  Chest: No chest wall tenderness, no subcutaneous emphysema   Cardiac: S1, S2, RRR  Respiratory: Bilateral breath sounds, clear and equal bilaterally  Abdomen: Soft, non-distended, non-tender, no rebound, no guarding.  Groin: Normal appearing, pelvis stable   Ext:  Right knee pain with some swelling in knee immobilizer. Moves b/l upper and lower extremities with limited movement of right leg due to pain. Palpable Radial b/l UE, b/l DP palpable in LE.   Back: No T/L/S spine tenderness, No palpable runoff/stepoff/deformity      Vital Signs Last 24 Hrs  T(C): 37.1 (17 Mar 2022 12:48), Max: 37.1 (17 Mar 2022 12:48)  T(F): 98.8 (17 Mar 2022 12:48), Max: 98.8 (17 Mar 2022 12:48)  HR: 77 (17 Mar 2022 12:48) (70 - 77)  BP: 137/61 (17 Mar 2022 12:48) (84/41 - 137/61)  BP(mean): 71 (16 Mar 2022 23:19) (58 - 71)  RR: 18 (17 Mar 2022 12:48) (18 - 18)  SpO2: 99% (16 Mar 2022 23:19) (98% - 99%)    Labs:  CAPILLARY BLOOD GLUCOSE                              7.9    5.29  )-----------( 109      ( 17 Mar 2022 04:30 )             25.3       Auto Neutrophil %: 58.8 % (03-17-22 @ 04:30)  Auto Immature Granulocyte %: 0.4 % (03-17-22 @ 04:30)    03-17    136  |  102  |  34<H>  ----------------------------<  106<H>  4.9   |  25  |  1.0      Calcium, Total Serum: 8.5 mg/dL (03-17-22 @ 04:30)      LFTs:             4.9  | 0.5  | 12       ------------------[81      ( 17 Mar 2022 04:30 )  3.1  | x    | 7           Lipase:x      Amylase:x             Coags:     14.00  ----< 1.22    ( 17 Mar 2022 04:30 )     41.3            RADIOLOGICAL FINDINGS REVIEW:    (03.16.22 @ 14:02) >< from: Xray Hip 2-3 Views, Right / Xray Knee 4 Views, Right / X-Ray Femur 2 Views, Right / Xray Tibia + Fibula 2 Views, Right    1.  Periprosthetic fracture of the right distal femur complete just   proximal to the total knee arthroplasty with approximately 2.6 cm of   lateral displacement and approximately 3 cm of bony overlap.  2.  Knee joint arthroplasty appears in anatomic alignment without   dislocation.    < end of copied text >    < from: Xray Chest 1 View-PORTABLE IMMEDIATE (Xray Chest 1 View-PORTABLE IMMEDIATE .) (03.16.22 @ 18:51) >    No focal consolidation, pneumothorax or pleural effusion.    Stable cardiomediastinal silhouette.    Unchanged osseous structures.    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 Trauma assessment in ED: ABCs intact , GCS 15 , AAOx3. On trauma workup, patient has right PATRICK Type II periprosthetic distal femur fracture. Physical exam findings, imaging, and labs as documented above.    - All images/reports reviewed.   - No further traumatic work-up warranted.  - Recall PRN    [phone]

## 2022-03-17 NOTE — PROGRESS NOTE ADULT - ASSESSMENT
ASSESSMENT & PLAN:  Ms Prescott is a 87 SEE w a PMH of dyslipidemia, renal stone, shingles, HFpEF, GI bleeds, A-fib (not on AC due to GI bleed) and hypertension presented to the ED after tripping and falling which resulted in a complete and displaced fracture of right femur    Problem List:  # R Periprosthetic Femur Fx  NWB Right LE, Knee immobilizer  Displaced fracture  - DVT ppx: SCD, lovenox  - Pain control w 2mg morphine   - medically optimized  - cardiology: Elevated-risk patient for a Moderate-risk surgery/procedure  - Rapid COVID test      # HFpEF not in exacerbation  # pAFib no longer on AC     - c/w Metoprolol Succ ER 12.5 QD  - c/w Lasix 2 times per week/3 times per week alternating   - Ensure Euvolemia (or as close to it as feasible) prior to surgery    # Anemia suspected 2/2 GIB but workup neg to date  - Iron = 26  - TIBC WNL  -%Fe sat = 6  -Ferratin, folate and B12 WNL   - Check fecal occult immuno     # DLD  - c/w Statin     # h/o Tonsillar Cancer  - s/p surgical resection of mass from soft pallate, ensure anesthesia is aware pt has obturator covering hole in post oropharynx prior to surgery        #Misc  - DVT Prophylaxis:  - GI Prophylaxis:  - Diet:  - Activity:  - IV Fluids:  - Code Status:    Dispo:

## 2022-03-17 NOTE — PATIENT PROFILE ADULT - FALL HARM RISK - HARM RISK INTERVENTIONS

## 2022-03-17 NOTE — CONSULT NOTE ADULT - ASSESSMENT
* SUMMARY:    * Patient-based characteristics (Functional capacity)  Patient is able to achieve more than 4 MET (walk 4 blocks, climb 2 flights of stairs, etc...)          Y [X] / N []    High-risk patient features:  - Recent (<30 days) or active MI          Y [] / N [X]  - Unstable or severe angina          Y [] / N [X]  - Decompensated heart failure, or worsening or new-onset heart failure          Y [] / N [X]  - Severe valvular disease          Y [] / N [X]  - Significant arrhythmia (Tachy- or Bradyarrhythmia)          Y [X] / N []    * Surgery/Procedure-based characteristics (Type of surgery)  - Low-risk procedure (outpatient procedure, elective, endoscopy, etc...)          Y [] / N []  - Elevated or Moderate-risk procedure (Inpatient)          Y [X] / N []  - High-risk procedure (urgent/emergent procedure, Intrathoracic, vascular, etc...)          Y [] / N []    * Revised Cardiac Risk Index (RCRI)  1- History of ischemic heart disease          Y [] / N [X]  2- History of congestive heart failure          Y [X] / N []  3- History of stroke/TIA          Y [] / N [X]  4- History of insulin-dependent diabetes          Y [] / N [X]  5- Chronic kidney disease (Cr >2mg/dL)          Y [] / N [X]  6- Undergoing suprainguinal vascular, intraperitoneal, or intrathoracic surgery          Y [] / N [X]    Class I risk (Zero factors) --> 3.9% (30-day risk of death, MI, or cardiac arrest)  Class II risk (One factor) --> 6% risk (30-day risk of death, MI, or cardiac arrest)  Class III risk (Two factors) --> 10.1% risk (30-day risk of death, MI, or cardiac arrest)  Class IV risk (Three factors or more) --> >15% risk (30-day risk of death, MI, or cardiac arrest)    * IMPRESSION & RECOMMENDATIONS:  ·	Moderate-risk patient for a Moderate-risk surgery/procedure  No further cardiac work-up is needed at the moment. There are no current cardiac contraindications to prevent from proceeding with the scheduled surgery/procedure.    - This consult serves only as a russell-operative cardiac risk stratification and evaluation to predict 30-days cardiac complications risk and mortality. The decision to proceed with the surgery/procedure is made by the performing physician and the patient -    *** This is a preliminary resident note. Final recs pending discussion with attending. *** * SUMMARY:    * Patient-based characteristics (Functional capacity)  Patient is able to achieve more than 4 MET (walk 4 blocks, climb 2 flights of stairs, etc...)          Y [X] / N []    High-risk patient features:  - Recent (<30 days) or active MI          Y [] / N [X]  - Unstable or severe angina          Y [] / N [X]  - Decompensated heart failure, or worsening or new-onset heart failure          Y [] / N [X]  - Severe valvular disease          Y [] / N [X]  - Significant arrhythmia (Tachy- or Bradyarrhythmia)          Y [X] / N []    * Surgery/Procedure-based characteristics (Type of surgery)  - Low-risk procedure (outpatient procedure, elective, endoscopy, etc...)          Y [] / N []  - Elevated or Moderate-risk procedure (Inpatient)          Y [X] / N []  - High-risk procedure (urgent/emergent procedure, Intrathoracic, vascular, etc...)          Y [] / N []    * Revised Cardiac Risk Index (RCRI)  1- History of ischemic heart disease          Y [] / N [X]  2- History of congestive heart failure          Y [X] / N []  3- History of stroke/TIA          Y [] / N [X]  4- History of insulin-dependent diabetes          Y [] / N [X]  5- Chronic kidney disease (Cr >2mg/dL)          Y [] / N [X]  6- Undergoing suprainguinal vascular, intraperitoneal, or intrathoracic surgery          Y [] / N [X]    Class I risk (Zero factors) --> 3.9% (30-day risk of death, MI, or cardiac arrest)  Class II risk (One factor) --> 6% risk (30-day risk of death, MI, or cardiac arrest)  Class III risk (Two factors) --> 10.1% risk (30-day risk of death, MI, or cardiac arrest)  Class IV risk (Three factors or more) --> >15% risk (30-day risk of death, MI, or cardiac arrest)    * IMPRESSION & RECOMMENDATIONS:  ·	Elevated-risk patient for a Moderate-risk surgery/procedure  No further cardiac work-up is needed at the moment. There are no current cardiac contraindications to prevent from proceeding with the scheduled surgery/procedure.    - This consult serves only as a russell-operative cardiac risk stratification and evaluation to predict 30-days cardiac complications risk and mortality. The decision to proceed with the surgery/procedure is made by the performing physician and the patient -       * Patient-based characteristics (Functional capacity)  Patient is able to achieve more than 4 MET (walk 4 blocks, climb 2 flights of stairs, etc...)          Y [X] / N []    High-risk patient features:  - Recent (<30 days) or active MI          Y [] / N [X]  - Unstable or severe angina          Y [] / N [X]  - Decompensated heart failure, or worsening or new-onset heart failure          Y [] / N [X]  - Severe valvular disease          Y [] / N [X]  - Significant arrhythmia (Tachy- or Bradyarrhythmia)          Y [X] / N []    * Surgery/Procedure-based characteristics (Type of surgery)  - Low-risk procedure (outpatient procedure, elective, endoscopy, etc...)          Y [] / N []  - Elevated or Moderate-risk procedure (Inpatient)          Y [X] / N []  - High-risk procedure (urgent/emergent procedure, Intrathoracic, vascular, etc...)          Y [] / N []    * Revised Cardiac Risk Index (RCRI)  1- History of ischemic heart disease          Y [] / N [X]  2- History of congestive heart failure          Y [X] / N []  3- History of stroke/TIA          Y [] / N [X]  4- History of insulin-dependent diabetes          Y [] / N [X]  5- Chronic kidney disease (Cr >2mg/dL)          Y [] / N [X]  6- Undergoing suprainguinal vascular, intraperitoneal, or intrathoracic surgery          Y [] / N [X]    Class I risk (Zero factors) --> 3.9% (30-day risk of death, MI, or cardiac arrest)  Class II risk (One factor) --> 6% risk (30-day risk of death, MI, or cardiac arrest)  Class III risk (Two factors) --> 10.1% risk (30-day risk of death, MI, or cardiac arrest)  Class IV risk (Three factors or more) --> >15% risk (30-day risk of death, MI, or cardiac arrest)    * IMPRESSION & RECOMMENDATIONS:  ·	Elevated-risk patient for a Moderate-risk surgery/procedure  No further cardiac work-up is needed at the moment. There are no current cardiac contraindications to prevent from proceeding with the scheduled surgery/procedure.    - This consult serves only as a russell-operative cardiac risk stratification and evaluation to predict 30-days cardiac complications risk and mortality. The decision to proceed with the surgery/procedure is made by the performing physician and the patient -   87 yo F with right periprosthetic distal femur fracture.    * Patient-based characteristics (Functional capacity)  Patient is able to achieve more than 4 MET (walk 4 blocks, climb 2 flights of stairs, etc...)          Y [X] / N []    High-risk patient features:  - Recent (<30 days) or active MI          Y [] / N [X]  - Unstable or severe angina          Y [] / N [X]  - Decompensated heart failure, or worsening or new-onset heart failure          Y [] / N [X]  - Severe valvular disease          Y [] / N [X]  - Significant arrhythmia (Tachy- or Bradyarrhythmia)          Y [X] / N []    * Surgery/Procedure-based characteristics (Type of surgery)  - Elevated or Moderate-risk procedure (Inpatient)          Y [X] / N []    * Revised Cardiac Risk Index (RCRI)  1- History of ischemic heart disease          Y [] / N [X]  2- History of congestive heart failure          Y [X] / N []  3- History of stroke/TIA          Y [] / N [X]  4- History of insulin-dependent diabetes          Y [] / N [X]  5- Chronic kidney disease (Cr >2mg/dL)          Y [] / N [X]  6- Undergoing suprainguinal vascular, intraperitoneal, or intrathoracic surgery          Y [] / N [X]    Class II risk (One factor) --> 6% risk (30-day risk of death, MI, or cardiac arrest)      * IMPRESSION & RECOMMENDATIONS:  ·	Elevated-risk patient for a Moderate-risk surgery/procedure

## 2022-03-18 LAB
ALBUMIN SERPL ELPH-MCNC: 3 G/DL — LOW (ref 3.5–5.2)
ALP SERPL-CCNC: 78 U/L — SIGNIFICANT CHANGE UP (ref 30–115)
ALT FLD-CCNC: 6 U/L — SIGNIFICANT CHANGE UP (ref 0–41)
ANION GAP SERPL CALC-SCNC: 8 MMOL/L — SIGNIFICANT CHANGE UP (ref 7–14)
AST SERPL-CCNC: 11 U/L — SIGNIFICANT CHANGE UP (ref 0–41)
BASOPHILS # BLD AUTO: 0.03 K/UL — SIGNIFICANT CHANGE UP (ref 0–0.2)
BASOPHILS NFR BLD AUTO: 0.5 % — SIGNIFICANT CHANGE UP (ref 0–1)
BILIRUB SERPL-MCNC: 0.6 MG/DL — SIGNIFICANT CHANGE UP (ref 0.2–1.2)
BUN SERPL-MCNC: 23 MG/DL — HIGH (ref 10–20)
CALCIUM SERPL-MCNC: 8.4 MG/DL — LOW (ref 8.5–10.1)
CHLORIDE SERPL-SCNC: 101 MMOL/L — SIGNIFICANT CHANGE UP (ref 98–110)
CO2 SERPL-SCNC: 24 MMOL/L — SIGNIFICANT CHANGE UP (ref 17–32)
CREAT SERPL-MCNC: 0.8 MG/DL — SIGNIFICANT CHANGE UP (ref 0.7–1.5)
EGFR: 71 ML/MIN/1.73M2 — SIGNIFICANT CHANGE UP
EOSINOPHIL # BLD AUTO: 0.03 K/UL — SIGNIFICANT CHANGE UP (ref 0–0.7)
EOSINOPHIL NFR BLD AUTO: 0.5 % — SIGNIFICANT CHANGE UP (ref 0–8)
GLUCOSE SERPL-MCNC: 110 MG/DL — HIGH (ref 70–99)
HCT VFR BLD CALC: 23.9 % — LOW (ref 37–47)
HGB BLD-MCNC: 7.5 G/DL — LOW (ref 12–16)
IMM GRANULOCYTES NFR BLD AUTO: 0.3 % — SIGNIFICANT CHANGE UP (ref 0.1–0.3)
LYMPHOCYTES # BLD AUTO: 1.2 K/UL — SIGNIFICANT CHANGE UP (ref 1.2–3.4)
LYMPHOCYTES # BLD AUTO: 18.8 % — LOW (ref 20.5–51.1)
MAGNESIUM SERPL-MCNC: 1.8 MG/DL — SIGNIFICANT CHANGE UP (ref 1.8–2.4)
MCHC RBC-ENTMCNC: 27.7 PG — SIGNIFICANT CHANGE UP (ref 27–31)
MCHC RBC-ENTMCNC: 31.4 G/DL — LOW (ref 32–37)
MCV RBC AUTO: 88.2 FL — SIGNIFICANT CHANGE UP (ref 81–99)
MONOCYTES # BLD AUTO: 0.83 K/UL — HIGH (ref 0.1–0.6)
MONOCYTES NFR BLD AUTO: 13 % — HIGH (ref 1.7–9.3)
NEUTROPHILS # BLD AUTO: 4.29 K/UL — SIGNIFICANT CHANGE UP (ref 1.4–6.5)
NEUTROPHILS NFR BLD AUTO: 66.9 % — SIGNIFICANT CHANGE UP (ref 42.2–75.2)
NRBC # BLD: 0 /100 WBCS — SIGNIFICANT CHANGE UP (ref 0–0)
PLATELET # BLD AUTO: 94 K/UL — LOW (ref 130–400)
POTASSIUM SERPL-MCNC: 4.7 MMOL/L — SIGNIFICANT CHANGE UP (ref 3.5–5)
POTASSIUM SERPL-SCNC: 4.7 MMOL/L — SIGNIFICANT CHANGE UP (ref 3.5–5)
PROT SERPL-MCNC: 4.8 G/DL — LOW (ref 6–8)
RBC # BLD: 2.71 M/UL — LOW (ref 4.2–5.4)
RBC # FLD: 24.9 % — HIGH (ref 11.5–14.5)
SODIUM SERPL-SCNC: 133 MMOL/L — LOW (ref 135–146)
WBC # BLD: 6.4 K/UL — SIGNIFICANT CHANGE UP (ref 4.8–10.8)
WBC # FLD AUTO: 6.4 K/UL — SIGNIFICANT CHANGE UP (ref 4.8–10.8)

## 2022-03-18 PROCEDURE — 99223 1ST HOSP IP/OBS HIGH 75: CPT

## 2022-03-18 PROCEDURE — 93306 TTE W/DOPPLER COMPLETE: CPT | Mod: 26

## 2022-03-18 PROCEDURE — 99232 SBSQ HOSP IP/OBS MODERATE 35: CPT

## 2022-03-18 RX ORDER — FUROSEMIDE 40 MG
10 TABLET ORAL DAILY
Refills: 0 | Status: DISCONTINUED | OUTPATIENT
Start: 2022-03-18 | End: 2022-03-20

## 2022-03-18 RX ORDER — ACETAMINOPHEN 500 MG
650 TABLET ORAL EVERY 6 HOURS
Refills: 0 | Status: DISCONTINUED | OUTPATIENT
Start: 2022-03-18 | End: 2022-03-21

## 2022-03-18 RX ADMIN — Medication 20 MILLIGRAM(S): at 05:16

## 2022-03-18 RX ADMIN — ENOXAPARIN SODIUM 40 MILLIGRAM(S): 100 INJECTION SUBCUTANEOUS at 23:20

## 2022-03-18 RX ADMIN — MORPHINE SULFATE 2 MILLIGRAM(S): 50 CAPSULE, EXTENDED RELEASE ORAL at 23:21

## 2022-03-18 RX ADMIN — Medication 10 MILLIGRAM(S): at 18:50

## 2022-03-18 RX ADMIN — SIMVASTATIN 20 MILLIGRAM(S): 20 TABLET, FILM COATED ORAL at 23:17

## 2022-03-18 RX ADMIN — PANTOPRAZOLE SODIUM 40 MILLIGRAM(S): 20 TABLET, DELAYED RELEASE ORAL at 08:18

## 2022-03-18 RX ADMIN — Medication 12.5 MILLIGRAM(S): at 05:14

## 2022-03-18 RX ADMIN — SENNA PLUS 2 TABLET(S): 8.6 TABLET ORAL at 23:17

## 2022-03-18 RX ADMIN — Medication 2000 UNIT(S): at 12:33

## 2022-03-18 NOTE — CONSULT NOTE ADULT - SUBJECTIVE AND OBJECTIVE BOX
Date of Admission: 3/18/22    Chief Complaint: Patient is a 87y old  Female who presents with a chief complaint of Fall (17 Mar 2022 09:42)    HISTORY OF PRESENT ILLNESS: 86 F PMH HFpEF, Afib on Eliquis, Anemia suspected to be secondary to GIB ( but never proven s/p neg EGD and C-scope), HTN, and HLD, hx of tonsillar ca s/p resection w/obturator in placeto cover hole in pallate, BIBEMS s/p mechanical fall at home. Pt lives with her daughter and went to her old home to collect some documents and tripped in the doorway and landed on her right knee. Had immediate pain, unable to ambulate afterwards. Pt normally ambulates w/ a cane. Pt underwent left TKA by Dr. Morton in  and a right TKA by Dr. Perkins at [facility] approx 8 years ago. right PATRICK Type II periprosthetic distal femur fracture. Pt placed in a knee immobilizer.   ED COURSE: As above, HD stable (16 Mar 2022 20:55)        PAST MEDICAL & SURGICAL HISTORY:  HLD  HFpEF       FAMILY HISTORY:  Mother:  at 99 old age  Father:  at 92 [place]    SOCIAL HISTORY:    Patient denies smoking, alcohol or drug use    Allergies  No Known Allergies    Intolerances    REVIEW OF SYSTEMS:    CONSTITUTIONAL: No fever, weight loss, or fatigue  CARDIOLOGY: denies chest pain, shortness of breath or syncopal episodes.   RESPIRATORY: denies shortness of breath  NEUROLOGICAL: No weakness, no focal deficits to report.  ENDOCRINOLOGICAL: no recent change in diabetic medications.   GI: no BRBPR, no N,V, diarrhea.    PSYCHIATRY: normal mood and affect  HEENT: no nasal discharge, no ecchymosis  SKIN: no ecchymosis, no breakdown  MUSCULOSKELETAL: Full range of motion x4.     PHYSICAL EXAM:  General Appearance: well appearing, normal for age and gender. 	  Neck: normal JVP, no bruit.   Eyes:  Extra Ocular muscles intact.   Cardiovascular: regular rate and rhythm S1 S2, No JVD, No murmurs, No edema  Respiratory: Lungs clear to auscultation	  Psychiatry: Alert and oriented x 3, Mood & affect appropriate  Gastrointestinal:  Soft, Non-tender  Skin/Integumen: No rashes, No ecchymoses, No cyanosis	  Neurologic: Non-focal  Musculoskeletal/ extremities: Normal range of motion, No clubbing, cyanosis or edema  Vascular: Peripheral pulses palpable 2+ bilaterally      CARDIAC MARKERS:  Serum Pro-Brain Natriuretic Peptide: 669 pg/mL (22 @ 19:55)        PREVIOUS DIAGNOSTIC TESTING:    TTE 3/18/22    Summary:   1. Normal global left ventricular systolic function.   2. Moderately enlarged left atrium.   3. Normal right atrial size.   4. Mild mitral annular calcification.   5. Mild mitral valve regurgitation.   6. Mild tricuspid regurgitation.   7. PSAP 45.   8. Mild aortic regurgitation.   9. Sclerotic aortic valve with decreased opening.  10. Peak gradient of 20 with a mean of 11 c/w Mild AS.  11. Mild pulmonic valve regurgitation.  12. Asc aorta 3.6cm.      TTE 21  Summary:   1. LV Ejection Fraction by Pinto's Method with a biplane EF of 65 %.   2. Normal global left ventricular systolic function.   3. Moderate left ventricular hypertrophy.   4. Spectral Doppler shows pseudonormal pattern of left ventricular myocardial filling (Grade II diastolic dysfunction).   5. Severely enlarged left atrium.   6. Severely enlarged right atrium.   7. Sclerotic aortic valve with normal opening.   8. Mild aortic regurgitation.   9. Thickening and calcification of the anterior and posterior mitral leaflets.  10. Mild-to-moderate mitral regurgitation.  11. Moderate tricuspid regurgitation.  12. Estimated pulmonary artery systolic pressure is 36.7 mmHg assuming a right atrial pressure of 15 mmHg, which is consistent with borderline pulmonary hypertension.      Home Medications:    MEDICATIONS  (STANDING):  apixaban 5 milliGRAM(s) Oral two times a day  metoprolol tartrate 25 milliGRAM(s) Oral two times a day  simvastatin 20 milliGRAM(s) Oral at bedtime    MEDICATIONS  (PRN):         Date of Admission: 3/18/22    Chief Complaint: Patient is a 87y old  Female who presents with a chief complaint of Fall (17 Mar 2022 09:42)    HISTORY OF PRESENT ILLNESS: 86 F PMH HFpEF, Afib on Eliquis, Anemia suspected to be secondary to GIB ( but never proven s/p neg EGD and C-scope), HTN, and HLD, hx of tonsillar ca s/p resection w/obturator in placeto cover hole in pallate, BIBEMS s/p mechanical fall at home. Pt lives with her daughter and went to her old home to collect some documents and tripped in the doorway and landed on her right knee. Had immediate pain, unable to ambulate afterwards. Pt normally ambulates w/ a cane. Pt underwent left TKA by Dr. Morton in  and a right TKA by Dr. Perkins at [facility] approx 8 years ago. right PATRICK Type II periprosthetic distal femur fracture. Pt placed in a knee immobilizer.   ED COURSE: As above, HD stable (16 Mar 2022 20:55)    Patient and daughter bedside report patient had a mechanical fall at home, denies LOC/dizziness/lightheadedness. Also denies head trauma. Patient sustained injury to R femur. Prior to fall, patient states she was ambulating without issue. Endorses a low Na diet and compliance with home medications. Denies chest pain, palpitations, SOB, abdominal discomfort, n/v, or loss of appetite.    PAST MEDICAL & SURGICAL HISTORY:  HLD  HFpEF       FAMILY HISTORY:  Mother:  at 99 old age  Father:  at 92 [place]    SOCIAL HISTORY:    Patient denies smoking, alcohol or drug use    Allergies  No Known Allergies    Intolerances    REVIEW OF SYSTEMS:    CONSTITUTIONAL: No fever, weight loss, or fatigue  CARDIOLOGY: denies chest pain, shortness of breath or syncopal episodes.   RESPIRATORY: denies shortness of breath  NEUROLOGICAL: No weakness, no focal deficits to report.  ENDOCRINOLOGICAL: no recent change in diabetic medications.   GI: no BRBPR, no N,V, diarrhea.    PSYCHIATRY: normal mood and affect  HEENT: no nasal discharge, no ecchymosis  SKIN: no ecchymosis, no breakdown  MUSCULOSKELETAL: Full range of motion x3, RLE limited ROM.     PHYSICAL EXAM:  General Appearance: well appearing, normal for age and gender. 	  Neck: normal JVP, no bruit.   Eyes:  Extra Ocular muscles intact.   Cardiovascular: regular rate and rhythm S1 S2, No JVD, No murmurs   Respiratory: Lungs clear to auscultation	  Psychiatry: Alert and oriented x 3, Mood & affect appropriate  Gastrointestinal:  Soft, Non-tender  Skin/Integumen: No rashes, No ecchymoses, No cyanosis	  Neurologic: Non-focal  Musculoskeletal/ extremities: RLE limited ROM, No clubbing, cyanosis, +R L edema  Vascular: Peripheral pulses palpable 2+ bilaterally      CARDIAC MARKERS:  Serum Pro-Brain Natriuretic Peptide: 669 pg/mL (22 @ 19:55)        PREVIOUS DIAGNOSTIC TESTING:    TTE 3/18/22    Summary:   1. Normal global left ventricular systolic function.   2. Moderately enlarged left atrium.   3. Normal right atrial size.   4. Mild mitral annular calcification.   5. Mild mitral valve regurgitation.   6. Mild tricuspid regurgitation.   7. PSAP 45.   8. Mild aortic regurgitation.   9. Sclerotic aortic valve with decreased opening.  10. Peak gradient of 20 with a mean of 11 c/w Mild AS.  11. Mild pulmonic valve regurgitation.  12. Asc aorta 3.6cm.      TTE 21  Summary:   1. LV Ejection Fraction by Pinto's Method with a biplane EF of 65 %.   2. Normal global left ventricular systolic function.   3. Moderate left ventricular hypertrophy.   4. Spectral Doppler shows pseudonormal pattern of left ventricular myocardial filling (Grade II diastolic dysfunction).   5. Severely enlarged left atrium.   6. Severely enlarged right atrium.   7. Sclerotic aortic valve with normal opening.   8. Mild aortic regurgitation.   9. Thickening and calcification of the anterior and posterior mitral leaflets.  10. Mild-to-moderate mitral regurgitation.  11. Moderate tricuspid regurgitation.  12. Estimated pulmonary artery systolic pressure is 36.7 mmHg assuming a right atrial pressure of 15 mmHg, which is consistent with borderline pulmonary hypertension.      Home Medications:    MEDICATIONS  (STANDING):  apixaban 5 milliGRAM(s) Oral two times a day  metoprolol tartrate 25 milliGRAM(s) Oral two times a day  simvastatin 20 milliGRAM(s) Oral at bedtime    MEDICATIONS  (PRN):

## 2022-03-18 NOTE — PROGRESS NOTE ADULT - ASSESSMENT
86 y/o F a PMH of dyslipidemia, renal stone, shingles, HFpEF, GI bleeds, A-fib (not on AC due to GI bleed) and hypertension presented to the ED after tripping and falling which resulted in a complete and displaced fracture of right femur      # R Periprosthetic Femur Fx  - NWB Right LE, Knee immobilizer  - Displaced fracture  - DVT ppx: SCD, lovenox  - Pain control w 2mg morphine   - medically optimized  - cardiology: Elevated-risk patient for a Moderate-risk surgery/procedure  - Rapid COVID test    # HFpEF not in exacerbation  # pAFib no longer on AC   -   - c/w Metoprolol Succ ER 12.5 QD  - c/w Lasix 2 times per week/3 times per week alternating   - Ensure Euvolemia (or as close to it as feasible) prior to surgery  - EKG demonstrating NSR    # Anemia suspected 2/2 GIB but workup neg to date  - Iron = 26  - TIBC WNL  -%Fe sat = 6  -Ferratin, folate and B12 WNL   - Check fecal occult immuno     # DLD  - c/w Statin     # h/o Tonsillar Cancer  - s/p surgical resection of mass from soft pallate, ensure anesthesia is aware pt has obturator covering hole in post oropharynx prior to surgery      #Progress Note Handoff  Pending (specify):  OR with Ortho on Monday with Dr. Tiwari  Family discussion: patient and family updated and in agreement of plan  Disposition: Unknown at this time________ .

## 2022-03-18 NOTE — CONSULT NOTE ADULT - ASSESSMENT
Acute on chronic diastolic heart failure / Anemia / Atrial flutter / CAD   Acute on chronic diastolic heart failure / Anemia / Atrial flutter / CAD    Patient is euvolemic on exam- POCUS exam: IVC collapsing   Give Furosemide 10mg IVP post blood transfusions   Please consider adding Tylenol PRN for pain management (patient/family request)  Get BMP daily   Maintain potassium >4.0, Mg >2.1  Strict intake and output  Daily weight   Anemia as per primary team  Femur fx as per ortho/trauma team  Plan discussed with primary team  Will continue to follow   Chronic diastolic heart failure / Anemia / Atrial flutter / CAD/ Distal Fx fracture s/p mechanical fall    Patient is euvolemic on exam- POCUS exam: IVC collapsing   Give Furosemide 10mg IVP after each unit of blood transfusions   Please consider adding Tylenol PRN for pain management (patient/family request)  Start iv iron sucrose 200 mg daily x5  Get BMP daily   Maintain potassium >4.0, Mg >2.1  Strict intake and output  Daily weight   Femur fx as per ortho/trauma team  Patient is at moderate risk for CV complications, avoid excessive fluid administration and hypotension. Use narcotics cautiously.  Plan discussed with primary team

## 2022-03-18 NOTE — PROGRESS NOTE ADULT - SUBJECTIVE AND OBJECTIVE BOX
LIZA HERNANDEZ  87y  Female      Patient is a 87y old  Female who presents with a chief complaint of Fall (17 Mar 2022 09:42)      INTERVAL HPI/OVERNIGHT EVENTS: no acute events overnight. no major complaints or issues. no nursing concerns. son and daughter at bedside.       REVIEW OF SYSTEMS:  CONSTITUTIONAL: No fever, weight loss, or fatigue  RESPIRATORY: No cough, wheezing, chills or hemoptysis; No shortness of breath  CARDIOVASCULAR: No chest pain, palpitations, dizziness, or leg swelling  GASTROINTESTINAL: No abdominal or epigastric pain. No nausea, vomiting, or hematemesis; No diarrhea or constipation. No melena or hematochezia.  GENITOURINARY: No dysuria, frequency, hematuria, or incontinence  NEUROLOGICAL: No headaches, memory loss, loss of strength, numbness, or tremors  SKIN: No itching, burning, rashes, or lesions   MUSCULOSKELETAL: No joint pain or swelling; No muscle, back, or extremity pain  PSYCHIATRIC: No depression, anxiety, mood swings, or difficulty sleeping  All other review of systems negative    VITALS  T(C): 35.9 (03-18-22 @ 06:51), Max: 37.3 (03-17-22 @ 20:53)  HR: 80 (03-18-22 @ 06:51) (77 - 80)  BP: 117/54 (03-18-22 @ 06:51) (117/54 - 132/64)  RR: 18 (03-18-22 @ 06:51) (18 - 18)  SpO2: --  Wt(kg): --Vital Signs Last 24 Hrs  T(C): 35.9 (18 Mar 2022 06:51), Max: 37.3 (17 Mar 2022 20:53)  T(F): 96.6 (18 Mar 2022 06:51), Max: 99.1 (17 Mar 2022 20:53)  HR: 80 (18 Mar 2022 06:51) (77 - 80)  BP: 117/54 (18 Mar 2022 06:51) (117/54 - 132/64)  BP(mean): --  RR: 18 (18 Mar 2022 06:51) (18 - 18)  SpO2: --        PHYSICAL EXAM:  GENERAL: elderly, frail appearing F, NAD, non toxic appearing  PSYCH: no agitation, baseline mentation  NERVOUS SYSTEM:  Alert & Oriented X3, CN 2-12 grossly intact  PULMONARY: Clear to percussion bilaterally; No rales, rhonchi, wheezing, or rubs  CARDIOVASCULAR: Regular rate and rhythm; No murmurs, rubs, or gallops  GI: Soft, Nontender, Nondistended; Bowel sounds present  EXTREMITIES:  2+ Peripheral Pulses, No clubbing, cyanosis, or edema  SKIN: No rashes or lesions    Consultant(s) Notes Reviewed:  [x ] YES  [ ] NO    Discussed with Consultants/Other Providers [ x] YES     LABS                          7.5    6.40  )-----------( 94       ( 18 Mar 2022 07:30 )             23.9     03-18    133<L>  |  101  |  23<H>  ----------------------------<  110<H>  4.7   |  24  |  0.8    Ca    8.4<L>      18 Mar 2022 07:30  Phos  3.6     03-17  Mg     1.8     03-18    TPro  4.8<L>  /  Alb  3.0<L>  /  TBili  0.6  /  DBili  x   /  AST  11  /  ALT  6   /  AlkPhos  78  03-18  PT/INR - ( 17 Mar 2022 04:30 )   PT: 14.00 sec;   INR: 1.22 ratio    PTT - ( 17 Mar 2022 04:30 )  PTT:41.3 sec  RADIOLOGY & ADDITIONAL TESTS:  - no images 3/18  Imaging Personally Reviewed:  [ ] YES  [ ] NO    HEALTH ISSUES - PROBLEM Dx:      MEDICATIONS  (STANDING):  cholecalciferol 2000 Unit(s) Oral daily  enoxaparin Injectable 40 milliGRAM(s) SubCutaneous every 24 hours  furosemide    Tablet 20 milliGRAM(s) Oral <User Schedule>  lactated ringers. 1000 milliLiter(s) (100 mL/Hr) IV Continuous <Continuous>  metoprolol succinate ER 12.5 milliGRAM(s) Oral daily  pantoprazole    Tablet 40 milliGRAM(s) Oral before breakfast  prednisoLONE acetate 1% Suspension 1 Drop(s) Right EYE daily  senna 2 Tablet(s) Oral at bedtime  simvastatin 20 milliGRAM(s) Oral at bedtime    MEDICATIONS  (PRN):  morphine  - Injectable 2 milliGRAM(s) IV Push every 4 hours PRN Moderate Pain (4 - 6)

## 2022-03-18 NOTE — PROGRESS NOTE ADULT - SUBJECTIVE AND OBJECTIVE BOX
LIZA HERNANDEZ 87y Female  MRN#: [id number]   Hospital Day: 2d    SUBJECTIVE  Patient is a 87y old Female who presents with a chief complaint of Fall (17 Mar 2022 09:42)  Currently admitted to medicine with the primary diagnosis of Displaced fracture of right femur      INTERVAL HPI AND OVERNIGHT EVENTS:  Patient was examined and seen at bedside. This morning she is resting comfortably in bed and reports SHE HAD SOME SOB YESTERDAY EVENING  OVERNIGHT: PATIENT DESATURATED TO 92% ON RA, PLACED ON 2L NC AND MAINTAINING O2.    REVIEW OF SYMPTOMS:  CONSTITUTIONAL: No weakness, fevers or chills; No headaches  EYES: RIGHT EYE NOT CAUSING ANY DISCOMFORT  ENT: No vertigo; No ear pain or change in hearing; No sore throat or difficulty swallowing  NECK: No pain or stiffness  RESPIRATORY: SOB BETTER W NC IN   CARDIOVASCULAR: No chest pain or palpitations  GASTROINTESTINAL: No abdominal or epigastric pain; No nausea, vomiting, or hematemesis; No diarrhea or constipation; No melena or hematochezia  GENITOURINARY: No dysuria, frequency or hematuria  MUSCULOSKELETAL: LEFT LEG UNCHANGED   NEUROLOGICAL: No numbness   SKIN: No itching or rashes    OBJECTIVE  PAST MEDICAL & SURGICAL HISTORY  High cholesterol    Kidney stone    Bladder polyp    Shingles  affecting right eye    Atrial flutter    H/O cystoscopy    H/O total knee replacement, right    H/O total knee replacement, left    Cancer of mouth    History of cardioversion      ALLERGIES:  No Known Allergies    MEDICATIONS:  STANDING MEDICATIONS  cholecalciferol 2000 Unit(s) Oral daily  enoxaparin Injectable 40 milliGRAM(s) SubCutaneous every 24 hours  furosemide    Tablet 20 milliGRAM(s) Oral <User Schedule>  lactated ringers. 1000 milliLiter(s) IV Continuous <Continuous>  metoprolol succinate ER 12.5 milliGRAM(s) Oral daily  pantoprazole    Tablet 40 milliGRAM(s) Oral before breakfast  prednisoLONE acetate 1% Suspension 1 Drop(s) Right EYE daily  senna 2 Tablet(s) Oral at bedtime  simvastatin 20 milliGRAM(s) Oral at bedtime    PRN MEDICATIONS  morphine  - Injectable 2 milliGRAM(s) IV Push every 4 hours PRN      VITAL SIGNS: Last 24 Hours  T(C): 35.9 (18 Mar 2022 06:51), Max: 37.3 (17 Mar 2022 20:53)  T(F): 96.6 (18 Mar 2022 06:51), Max: 99.1 (17 Mar 2022 20:53)  HR: 80 (18 Mar 2022 06:51) (77 - 80)  BP: 117/54 (18 Mar 2022 06:51) (117/54 - 132/64)  BP(mean): --  RR: 18 (18 Mar 2022 06:51) (18 - 18)  SpO2: --    LABS:                        7.5    6.40  )-----------( 94       ( 18 Mar 2022 07:30 )             23.9     03-18    133<L>  |  101  |  23<H>  ----------------------------<  110<H>  4.7   |  24  |  0.8    Ca    8.4<L>      18 Mar 2022 07:30  Phos  3.6     03-17  Mg     1.8     03-18    TPro  4.8<L>  /  Alb  3.0<L>  /  TBili  0.6  /  DBili  x   /  AST  11  /  ALT  6   /  AlkPhos  78  03-18    PT/INR - ( 17 Mar 2022 04:30 )   PT: 14.00 sec;   INR: 1.22 ratio         PTT - ( 17 Mar 2022 04:30 )  PTT:41.3 sec              RADIOLOGY:  NO NEW IMAGING     PHYSICAL EXAM:  CONSTITUTIONAL: No acute distress, well-developed, well-groomed POLITE CONVERSATIONAL   HEAD: Atraumatic, normocephalic  EYES: EOM intact,  ENT: Supple, no masses, no JVD; moist mucous membranes  PULMONARY: Clear to auscultation bilaterally; no wheezes, rales, or rhonchi  CARDIOVASCULAR: Regular rate and rhythm; no murmurs, rubs, or gallops  GASTROINTESTINAL: Soft, non-tender, non-distended; bowel sounds present  MUSCULOSKELETAL: 2+ peripheral pulses; BL LE EDEMA LFT>>RT NON PITTING   NEUROLOGY: A&OX4  SKIN: No rashes or lesions; warm and dry

## 2022-03-18 NOTE — PROGRESS NOTE ADULT - ASSESSMENT
Ms Prescott is a 87 SEE w a PMH of dyslipidemia, renal stone, shingles, HFpEF, GI bleeds, A-fib (not on AC due to GI bleed) and hypertension presented to the ED after tripping and falling which resulted in a complete and displaced fracture of right femur. CASE DISCUSSED W FAMILY AND ORTHO, PATIENT PLANNED FOR OR FOR REDUCTION AND FIXATION W DR SHERWOOD MON MARCH 21 FOLLOWING HIS LAST ELECTIVE CASE     Problem List:  # R Periprosthetic Femur Fx  NWB Right LE, Knee immobilizer  Displaced fracture  - DVT ppx: SCD, lovenox  - Pain control w 2mg morphine   - medically optimized  - cardiology: Elevated-risk patient for a Moderate-risk surgery/procedure  - Rapid COVID test  -OR MONDAY 3/21 DR SHERWOOD       # HFpEF not in exacerbation  # pAFib no longer on AC     - c/w Metoprolol Succ ER 12.5 QD  - c/w Lasix 2 times per week/3 times per week alternating   - Ensure Euvolemia (or as close to it as feasible) prior to surgery    # Anemia suspected 2/2 GIB but workup neg to date  - Iron = 26  - TIBC WNL  -%Fe sat = 6  -Ferratin, folate and B12 WNL   - Check fecal occult immuno     # DLD  - c/w Statin     # h/o Tonsillar Cancer  - s/p surgical resection of mass from soft pallate, ensure anesthesia is aware pt has obturator covering hole in post oropharynx prior to surgery        #Misc  - DVT Prophylaxis: SCD UNILATERAL + ENOXAPARIN   - GI Prophylaxis: PANTOPRAZOLE   - Diet: DASH/TLC  - Activity: BEDREST  - IV Fluids: LR   - Code Status:    Dispo: ACUTE

## 2022-03-19 LAB
ALBUMIN SERPL ELPH-MCNC: 2.8 G/DL — LOW (ref 3.5–5.2)
ALP SERPL-CCNC: 79 U/L — SIGNIFICANT CHANGE UP (ref 30–115)
ALT FLD-CCNC: 7 U/L — SIGNIFICANT CHANGE UP (ref 0–41)
ANION GAP SERPL CALC-SCNC: 11 MMOL/L — SIGNIFICANT CHANGE UP (ref 7–14)
AST SERPL-CCNC: 13 U/L — SIGNIFICANT CHANGE UP (ref 0–41)
BASOPHILS # BLD AUTO: 0.03 K/UL — SIGNIFICANT CHANGE UP (ref 0–0.2)
BASOPHILS NFR BLD AUTO: 0.4 % — SIGNIFICANT CHANGE UP (ref 0–1)
BILIRUB SERPL-MCNC: 1 MG/DL — SIGNIFICANT CHANGE UP (ref 0.2–1.2)
BUN SERPL-MCNC: 18 MG/DL — SIGNIFICANT CHANGE UP (ref 10–20)
CALCIUM SERPL-MCNC: 8.4 MG/DL — LOW (ref 8.5–10.1)
CHLORIDE SERPL-SCNC: 93 MMOL/L — LOW (ref 98–110)
CO2 SERPL-SCNC: 26 MMOL/L — SIGNIFICANT CHANGE UP (ref 17–32)
CREAT SERPL-MCNC: 0.7 MG/DL — SIGNIFICANT CHANGE UP (ref 0.7–1.5)
EGFR: 84 ML/MIN/1.73M2 — SIGNIFICANT CHANGE UP
EOSINOPHIL # BLD AUTO: 0.08 K/UL — SIGNIFICANT CHANGE UP (ref 0–0.7)
EOSINOPHIL NFR BLD AUTO: 1.1 % — SIGNIFICANT CHANGE UP (ref 0–8)
GLUCOSE SERPL-MCNC: 116 MG/DL — HIGH (ref 70–99)
HCT VFR BLD CALC: 25.7 % — LOW (ref 37–47)
HGB BLD-MCNC: 8.3 G/DL — LOW (ref 12–16)
IMM GRANULOCYTES NFR BLD AUTO: 0.4 % — HIGH (ref 0.1–0.3)
LYMPHOCYTES # BLD AUTO: 1.19 K/UL — LOW (ref 1.2–3.4)
LYMPHOCYTES # BLD AUTO: 16.9 % — LOW (ref 20.5–51.1)
MAGNESIUM SERPL-MCNC: 1.7 MG/DL — LOW (ref 1.8–2.4)
MCHC RBC-ENTMCNC: 27.9 PG — SIGNIFICANT CHANGE UP (ref 27–31)
MCHC RBC-ENTMCNC: 32.3 G/DL — SIGNIFICANT CHANGE UP (ref 32–37)
MCV RBC AUTO: 86.5 FL — SIGNIFICANT CHANGE UP (ref 81–99)
MONOCYTES # BLD AUTO: 0.91 K/UL — HIGH (ref 0.1–0.6)
MONOCYTES NFR BLD AUTO: 12.9 % — HIGH (ref 1.7–9.3)
NEUTROPHILS # BLD AUTO: 4.82 K/UL — SIGNIFICANT CHANGE UP (ref 1.4–6.5)
NEUTROPHILS NFR BLD AUTO: 68.3 % — SIGNIFICANT CHANGE UP (ref 42.2–75.2)
NRBC # BLD: 0 /100 WBCS — SIGNIFICANT CHANGE UP (ref 0–0)
PLATELET # BLD AUTO: 102 K/UL — LOW (ref 130–400)
POTASSIUM SERPL-MCNC: 4.1 MMOL/L — SIGNIFICANT CHANGE UP (ref 3.5–5)
POTASSIUM SERPL-SCNC: 4.1 MMOL/L — SIGNIFICANT CHANGE UP (ref 3.5–5)
PROT SERPL-MCNC: 5.1 G/DL — LOW (ref 6–8)
RBC # BLD: 2.97 M/UL — LOW (ref 4.2–5.4)
RBC # FLD: 23 % — HIGH (ref 11.5–14.5)
SODIUM SERPL-SCNC: 130 MMOL/L — LOW (ref 135–146)
WBC # BLD: 7.06 K/UL — SIGNIFICANT CHANGE UP (ref 4.8–10.8)
WBC # FLD AUTO: 7.06 K/UL — SIGNIFICANT CHANGE UP (ref 4.8–10.8)

## 2022-03-19 PROCEDURE — 99232 SBSQ HOSP IP/OBS MODERATE 35: CPT

## 2022-03-19 RX ORDER — MAGNESIUM SULFATE 500 MG/ML
2 VIAL (ML) INJECTION ONCE
Refills: 0 | Status: COMPLETED | OUTPATIENT
Start: 2022-03-19 | End: 2022-03-19

## 2022-03-19 RX ORDER — IRON SUCROSE 20 MG/ML
200 INJECTION, SOLUTION INTRAVENOUS EVERY 24 HOURS
Refills: 0 | Status: DISCONTINUED | OUTPATIENT
Start: 2022-03-19 | End: 2022-03-21

## 2022-03-19 RX ADMIN — Medication 12.5 MILLIGRAM(S): at 05:56

## 2022-03-19 RX ADMIN — MORPHINE SULFATE 2 MILLIGRAM(S): 50 CAPSULE, EXTENDED RELEASE ORAL at 09:07

## 2022-03-19 RX ADMIN — MORPHINE SULFATE 2 MILLIGRAM(S): 50 CAPSULE, EXTENDED RELEASE ORAL at 00:00

## 2022-03-19 RX ADMIN — SENNA PLUS 2 TABLET(S): 8.6 TABLET ORAL at 22:23

## 2022-03-19 RX ADMIN — MORPHINE SULFATE 2 MILLIGRAM(S): 50 CAPSULE, EXTENDED RELEASE ORAL at 21:28

## 2022-03-19 RX ADMIN — IRON SUCROSE 110 MILLIGRAM(S): 20 INJECTION, SOLUTION INTRAVENOUS at 11:05

## 2022-03-19 RX ADMIN — Medication 25 GRAM(S): at 17:09

## 2022-03-19 RX ADMIN — Medication 2000 UNIT(S): at 12:55

## 2022-03-19 RX ADMIN — Medication 10 MILLIGRAM(S): at 07:09

## 2022-03-19 RX ADMIN — ENOXAPARIN SODIUM 40 MILLIGRAM(S): 100 INJECTION SUBCUTANEOUS at 22:24

## 2022-03-19 RX ADMIN — PANTOPRAZOLE SODIUM 40 MILLIGRAM(S): 20 TABLET, DELAYED RELEASE ORAL at 06:21

## 2022-03-19 RX ADMIN — MORPHINE SULFATE 2 MILLIGRAM(S): 50 CAPSULE, EXTENDED RELEASE ORAL at 20:28

## 2022-03-19 RX ADMIN — SIMVASTATIN 20 MILLIGRAM(S): 20 TABLET, FILM COATED ORAL at 22:23

## 2022-03-19 NOTE — PROGRESS NOTE ADULT - SUBJECTIVE AND OBJECTIVE BOX
LIZA HERNANDEZ 87y Female  MRN#: [id number]   Hospital Day: 3d    SUBJECTIVE  Patient is a 87y old Female who presents with a chief complaint of fall  fx (18 Mar 2022 19:06)  Currently admitted to medicine with the primary diagnosis of Displaced fracture of right femur      INTERVAL HPI AND OVERNIGHT EVENTS:  Patient was examined and seen at bedside. This morning she is resting comfortably in bed and reports no issues or overnight events.    REVIEW OF SYMPTOMS:  CONSTITUTIONAL: No weakness, fevers or chills; No headaches  EYES: No visual changes, eye pain, or discharge  ENT: No vertigo; No ear pain or change in hearing; No sore throat or difficulty swallowing  NECK: No pain or stiffness  RESPIRATORY: No cough, wheezing, or hemoptysis; No shortness of breath  CARDIOVASCULAR: No chest pain or palpitations  GASTROINTESTINAL: No abdominal or epigastric pain; No nausea, vomiting, or hematemesis; No diarrhea or constipation; No melena or hematochezia  GENITOURINARY: No dysuria, frequency or hematuria  MUSCULOSKELETAL: RT lower extremity pain unchanged and well controlled   NEUROLOGICAL: No numbness or weakness  SKIN: No itching or rashes    OBJECTIVE  PAST MEDICAL & SURGICAL HISTORY  High cholesterol    Kidney stone    Bladder polyp    Shingles  affecting right eye    Atrial flutter    H/O cystoscopy    H/O total knee replacement, right    H/O total knee replacement, left    Cancer of mouth    History of cardioversion      ALLERGIES:  No Known Allergies    MEDICATIONS:  STANDING MEDICATIONS  cholecalciferol 2000 Unit(s) Oral daily  enoxaparin Injectable 40 milliGRAM(s) SubCutaneous every 24 hours  furosemide    Tablet 20 milliGRAM(s) Oral <User Schedule>  furosemide   Injectable 10 milliGRAM(s) IV Push daily  iron sucrose IVPB 200 milliGRAM(s) IV Intermittent every 24 hours  metoprolol succinate ER 12.5 milliGRAM(s) Oral daily  pantoprazole    Tablet 40 milliGRAM(s) Oral before breakfast  prednisoLONE acetate 1% Suspension 1 Drop(s) Right EYE daily  senna 2 Tablet(s) Oral at bedtime  simvastatin 20 milliGRAM(s) Oral at bedtime    PRN MEDICATIONS  acetaminophen     Tablet .. 650 milliGRAM(s) Oral every 6 hours PRN  morphine  - Injectable 2 milliGRAM(s) IV Push every 4 hours PRN      VITAL SIGNS: Last 24 Hours  T(C): 36.9 (19 Mar 2022 06:52), Max: 37.2 (18 Mar 2022 21:25)  T(F): 98.5 (19 Mar 2022 06:52), Max: 99 (18 Mar 2022 21:25)  HR: 74 (19 Mar 2022 06:52) (73 - 89)  BP: 126/60 (19 Mar 2022 06:52) (106/52 - 139/61)  BP(mean): --  RR: 18 (19 Mar 2022 06:52) (18 - 20)  SpO2: 99% (18 Mar 2022 14:28) (99% - 99%)    LABS:                        8.3    7.06  )-----------( 102      ( 19 Mar 2022 04:30 )             25.7     03-19    130<L>  |  93<L>  |  18  ----------------------------<  116<H>  4.1   |  26  |  0.7    Ca    8.4<L>      19 Mar 2022 04:30  Mg     1.7     03-19    TPro  5.1<L>  /  Alb  2.8<L>  /  TBili  1.0  /  DBili  x   /  AST  13  /  ALT  7   /  AlkPhos  79  03-19                  RADIOLOGY:      PHYSICAL EXAM:  CONSTITUTIONAL: No acute distress, well-developed, well-groomed  HEAD: Atraumatic, normocephalic  EYES: conjunctiva and sclera clear  ENT: Supple; moist mucous membranes  PULMONARY: Clear to auscultation bilaterally; no wheezes, rales, or rhonchi  CARDIOVASCULAR: RRR; no murmurs, rubs, or gallops  GASTROINTESTINAL: Soft, non-tender, non-distended; bowel sounds present  MUSCULOSKELETAL: RT LOWER EXT IMMOBILIZER ON  NEUROLOGY: A&OX4  SKIN: No rashes or lesions; warm and dry

## 2022-03-19 NOTE — PROGRESS NOTE ADULT - ASSESSMENT
Ms Prescott is a 87 SEE w a PMH of dyslipidemia, renal stone, shingles, HFpEF, GI bleeds, A-fib (not on AC due to GI bleed) and hypertension presented to the ED after tripping and falling which resulted in a complete and displaced fracture of right femur. CASE DISCUSSED W FAMILY AND ORTHO, PATIENT PLANNED FOR OR FOR REDUCTION AND FIXATION W DR SHERWOOD MON MARCH 21 FOLLOWING HIS LAST ELECTIVE CASE. PATIENT RECEIVED 1 UNIT PRBC 3/18 (NOT IN COMP BUT WAS GIVEN, WITNESSED BY MULTIPLE DRS) AND 10mL LASIX. TO BE REPEATED 3/19 AND 3/20     Problem List:  # R Periprosthetic Femur Fx  NWB Right LE, Knee immobilizer  Displaced fracture  - DVT ppx: SCD, lovenox  - Pain control w 2mg morphine, Tylenol 625 PRN   - medically optimized  - cardiology: Elevated-risk patient for a Moderate-risk surgery/procedure  - Rapid COVID test  -OR MONDAY 3/21 DR SHERWOOD   -PATIENT RECEIVED 1 UNIT PRBC, WILL GET ANOTHER 3/19 AND PLEASE GIVE 3RD 3/20 FOLLOWED BY 10mL LASIX TO AVOID OVERLOAD       # HFpEF not in exacerbation  # pAFib no longer on AC     - c/w Metoprolol Succ ER 12.5 QD  - c/w Lasix 2 times per week/3 times per week alternating   - Ensure Euvolemia (or as close to it as feasible) prior to surgery    # Anemia suspected 2/2 GIB but workup neg to date  - Iron = 26  - TIBC WNL  -%Fe sat = 6  -Ferratin, folate and B12 WNL   - Check fecal occult immuno     # DLD  - c/w Statin     # h/o Tonsillar Cancer  - s/p surgical resection of mass from soft pallate, ensure anesthesia is aware pt has obturator covering hole in post oropharynx prior to surgery        #Misc  - DVT Prophylaxis: SCD UNILATERAL + ENOXAPARIN   - GI Prophylaxis: PANTOPRAZOLE   - Diet: DASH/TLC  - Activity: BEDREST  - IV Fluids: LR   - Code Status:    Dispo: ACUTE

## 2022-03-20 LAB
ALBUMIN SERPL ELPH-MCNC: 2.8 G/DL — LOW (ref 3.5–5.2)
ALP SERPL-CCNC: 85 U/L — SIGNIFICANT CHANGE UP (ref 30–115)
ALT FLD-CCNC: 9 U/L — SIGNIFICANT CHANGE UP (ref 0–41)
ANION GAP SERPL CALC-SCNC: 10 MMOL/L — SIGNIFICANT CHANGE UP (ref 7–14)
APTT BLD: 34.3 SEC — SIGNIFICANT CHANGE UP (ref 27–39.2)
AST SERPL-CCNC: 15 U/L — SIGNIFICANT CHANGE UP (ref 0–41)
BASOPHILS # BLD AUTO: 0.02 K/UL — SIGNIFICANT CHANGE UP (ref 0–0.2)
BASOPHILS NFR BLD AUTO: 0.3 % — SIGNIFICANT CHANGE UP (ref 0–1)
BILIRUB SERPL-MCNC: 1.2 MG/DL — SIGNIFICANT CHANGE UP (ref 0.2–1.2)
BLD GP AB SCN SERPL QL: SIGNIFICANT CHANGE UP
BUN SERPL-MCNC: 19 MG/DL — SIGNIFICANT CHANGE UP (ref 10–20)
CALCIUM SERPL-MCNC: 8.3 MG/DL — LOW (ref 8.5–10.1)
CHLORIDE SERPL-SCNC: 89 MMOL/L — LOW (ref 98–110)
CO2 SERPL-SCNC: 27 MMOL/L — SIGNIFICANT CHANGE UP (ref 17–32)
CREAT SERPL-MCNC: 0.7 MG/DL — SIGNIFICANT CHANGE UP (ref 0.7–1.5)
EGFR: 84 ML/MIN/1.73M2 — SIGNIFICANT CHANGE UP
EOSINOPHIL # BLD AUTO: 0.08 K/UL — SIGNIFICANT CHANGE UP (ref 0–0.7)
EOSINOPHIL NFR BLD AUTO: 1.2 % — SIGNIFICANT CHANGE UP (ref 0–8)
GLUCOSE SERPL-MCNC: 113 MG/DL — HIGH (ref 70–99)
HCT VFR BLD CALC: 28.6 % — LOW (ref 37–47)
HGB BLD-MCNC: 9.7 G/DL — LOW (ref 12–16)
IMM GRANULOCYTES NFR BLD AUTO: 0.2 % — SIGNIFICANT CHANGE UP (ref 0.1–0.3)
INR BLD: 1.16 RATIO — SIGNIFICANT CHANGE UP (ref 0.65–1.3)
LYMPHOCYTES # BLD AUTO: 1.28 K/UL — SIGNIFICANT CHANGE UP (ref 1.2–3.4)
LYMPHOCYTES # BLD AUTO: 19.3 % — LOW (ref 20.5–51.1)
MAGNESIUM SERPL-MCNC: 1.9 MG/DL — SIGNIFICANT CHANGE UP (ref 1.8–2.4)
MCHC RBC-ENTMCNC: 29 PG — SIGNIFICANT CHANGE UP (ref 27–31)
MCHC RBC-ENTMCNC: 33.9 G/DL — SIGNIFICANT CHANGE UP (ref 32–37)
MCV RBC AUTO: 85.4 FL — SIGNIFICANT CHANGE UP (ref 81–99)
MONOCYTES # BLD AUTO: 0.84 K/UL — HIGH (ref 0.1–0.6)
MONOCYTES NFR BLD AUTO: 12.7 % — HIGH (ref 1.7–9.3)
NEUTROPHILS # BLD AUTO: 4.4 K/UL — SIGNIFICANT CHANGE UP (ref 1.4–6.5)
NEUTROPHILS NFR BLD AUTO: 66.3 % — SIGNIFICANT CHANGE UP (ref 42.2–75.2)
NRBC # BLD: 0 /100 WBCS — SIGNIFICANT CHANGE UP (ref 0–0)
PLATELET # BLD AUTO: 110 K/UL — LOW (ref 130–400)
POTASSIUM SERPL-MCNC: 4.1 MMOL/L — SIGNIFICANT CHANGE UP (ref 3.5–5)
POTASSIUM SERPL-SCNC: 4.1 MMOL/L — SIGNIFICANT CHANGE UP (ref 3.5–5)
PROT SERPL-MCNC: 5.1 G/DL — LOW (ref 6–8)
PROTHROM AB SERPL-ACNC: 13.3 SEC — HIGH (ref 9.95–12.87)
RBC # BLD: 3.35 M/UL — LOW (ref 4.2–5.4)
RBC # FLD: 22 % — HIGH (ref 11.5–14.5)
SODIUM SERPL-SCNC: 126 MMOL/L — LOW (ref 135–146)
WBC # BLD: 6.63 K/UL — SIGNIFICANT CHANGE UP (ref 4.8–10.8)
WBC # FLD AUTO: 6.63 K/UL — SIGNIFICANT CHANGE UP (ref 4.8–10.8)

## 2022-03-20 PROCEDURE — 71045 X-RAY EXAM CHEST 1 VIEW: CPT | Mod: 26

## 2022-03-20 PROCEDURE — 99232 SBSQ HOSP IP/OBS MODERATE 35: CPT

## 2022-03-20 RX ADMIN — ENOXAPARIN SODIUM 40 MILLIGRAM(S): 100 INJECTION SUBCUTANEOUS at 21:41

## 2022-03-20 RX ADMIN — Medication 2000 UNIT(S): at 12:51

## 2022-03-20 RX ADMIN — SENNA PLUS 2 TABLET(S): 8.6 TABLET ORAL at 21:41

## 2022-03-20 RX ADMIN — MORPHINE SULFATE 2 MILLIGRAM(S): 50 CAPSULE, EXTENDED RELEASE ORAL at 00:44

## 2022-03-20 RX ADMIN — PANTOPRAZOLE SODIUM 40 MILLIGRAM(S): 20 TABLET, DELAYED RELEASE ORAL at 06:09

## 2022-03-20 RX ADMIN — IRON SUCROSE 110 MILLIGRAM(S): 20 INJECTION, SOLUTION INTRAVENOUS at 13:08

## 2022-03-20 RX ADMIN — MORPHINE SULFATE 2 MILLIGRAM(S): 50 CAPSULE, EXTENDED RELEASE ORAL at 01:44

## 2022-03-20 RX ADMIN — Medication 650 MILLIGRAM(S): at 02:43

## 2022-03-20 RX ADMIN — SIMVASTATIN 20 MILLIGRAM(S): 20 TABLET, FILM COATED ORAL at 21:41

## 2022-03-20 RX ADMIN — Medication 20 MILLIGRAM(S): at 06:09

## 2022-03-20 RX ADMIN — Medication 650 MILLIGRAM(S): at 01:43

## 2022-03-20 RX ADMIN — Medication 1 DROP(S): at 13:44

## 2022-03-20 NOTE — PROGRESS NOTE ADULT - SUBJECTIVE AND OBJECTIVE BOX
GAELZULEIKA LIZA  87y  Female      Patient is a 87y old  Female who presents with a chief complaint of fall  fx (18 Mar 2022 19:06)      INTERVAL HPI/OVERNIGHT EVENTS: no acute events overnight.       REVIEW OF SYSTEMS:  CONSTITUTIONAL: No fever, weight loss, or fatigue  RESPIRATORY: No cough, wheezing, chills or hemoptysis; No shortness of breath  CARDIOVASCULAR: No chest pain, palpitations, dizziness, or leg swelling  GASTROINTESTINAL: No abdominal or epigastric pain. No nausea, vomiting, or hematemesis; No diarrhea or constipation. No melena or hematochezia.  GENITOURINARY: No dysuria, frequency, hematuria, or incontinence  NEUROLOGICAL: No headaches, memory loss, loss of strength, numbness, or tremors  SKIN: No itching, burning, rashes, or lesions   MUSCULOSKELETAL: No joint pain or swelling; No muscle, back, or extremity pain  PSYCHIATRIC: No depression, anxiety, mood swings, or difficulty sleeping  All other review of systems negative    T(C): 36.5 (03-20-22 @ 11:57), Max: 37.2 (03-19-22 @ 20:49)  HR: 64 (03-20-22 @ 11:57) (64 - 84)  BP: 110/54 (03-20-22 @ 11:57) (100/62 - 120/56)  RR: 18 (03-20-22 @ 11:57) (18 - 19)  SpO2: 98% (03-20-22 @ 05:15) (98% - 99%)  Wt(kg): --Vital Signs Last 24 Hrs  T(C): 36.5 (20 Mar 2022 11:57), Max: 37.2 (19 Mar 2022 20:49)  T(F): 97.7 (20 Mar 2022 11:57), Max: 99 (19 Mar 2022 20:49)  HR: 64 (20 Mar 2022 11:57) (64 - 84)  BP: 110/54 (20 Mar 2022 11:57) (100/62 - 120/56)  BP(mean): --  RR: 18 (20 Mar 2022 11:57) (18 - 19)  SpO2: 98% (20 Mar 2022 05:15) (98% - 99%)      03-19-22 @ 07:01  -  03-20-22 @ 07:00  --------------------------------------------------------  IN: 440 mL / OUT: 1100 mL / NET: -660 mL        PHYSICAL EXAM:  GENERAL: elderly, frail appearing F, NAD, non toxic appearing  PSYCH: no agitation, baseline mentation  NERVOUS SYSTEM:  Alert & Oriented X3, CN 2-12 grossly intact  PULMONARY: Clear to percussion bilaterally; No rales, rhonchi, wheezing, or rubs  CARDIOVASCULAR: Regular rate and rhythm; No murmurs, rubs, or gallops  GI: Soft, Nontender, Nondistended; Bowel sounds present  EXTREMITIES:  2+ Peripheral Pulses, No clubbing, cyanosis, or edema  SKIN: No rashes or lesions      Consultant(s) Notes Reviewed:  [x ] YES  [ ] NO    Discussed with Consultants/Other Providers [ x] YES     LABS                          9.7    6.63  )-----------( 110      ( 20 Mar 2022 07:39 )             28.6     03-20    126<L>  |  89<L>  |  19  ----------------------------<  113<H>  4.1   |  27  |  0.7    Ca    8.3<L>      20 Mar 2022 07:39  Mg     1.9     03-20    TPro  5.1<L>  /  Alb  2.8<L>  /  TBili  1.2  /  DBili  x   /  AST  15  /  ALT  9   /  AlkPhos  85  03-20    RADIOLOGY & ADDITIONAL TESTS:  - no images 3/20  Imaging Personally Reviewed:  [ ] YES  [ ] NO    HEALTH ISSUES - PROBLEM Dx:      MEDICATIONS  (STANDING):  cholecalciferol 2000 Unit(s) Oral daily  enoxaparin Injectable 40 milliGRAM(s) SubCutaneous every 24 hours  furosemide    Tablet 20 milliGRAM(s) Oral <User Schedule>  iron sucrose IVPB 200 milliGRAM(s) IV Intermittent every 24 hours  metoprolol succinate ER 12.5 milliGRAM(s) Oral daily  pantoprazole    Tablet 40 milliGRAM(s) Oral before breakfast  prednisoLONE acetate 1% Suspension 1 Drop(s) Right EYE daily  senna 2 Tablet(s) Oral at bedtime  simvastatin 20 milliGRAM(s) Oral at bedtime    MEDICATIONS  (PRN):  acetaminophen     Tablet .. 650 milliGRAM(s) Oral every 6 hours PRN Moderate Pain (4 - 6), Severe Pain (7 - 10)  morphine  - Injectable 2 milliGRAM(s) IV Push every 4 hours PRN Moderate Pain (4 - 6)

## 2022-03-20 NOTE — PRE PROCEDURE NOTE - PRE PROCEDURE EVALUATION
ORTHOPEDIC SURGERY PRE OP NOTE      Diagnosis: right distal femur periprosthetic fracture    Planned Procedure: Right femur open reduction internal fixation    Consent: TO BE OBTAINED BY ATTENDING                   Risks/benefits/alternatives were discussed with the patient/family and they wish to proceed with surgery.       ANTICIPATED DATE OF PROCEDURE : 3/21/22  SCHEDULED CASE OR ADD-ON CASE: Add on    Clearances:   [x] Cardiology/Medicine    T(C): 37.3 (03-20-22 @ 20:09), Max: 37.3 (03-20-22 @ 20:09)  HR: 61 (03-20-22 @ 20:09) (61 - 69)  BP: 114/54 (03-20-22 @ 20:09) (100/62 - 114/54)  RR: 18 (03-20-22 @ 20:09) (18 - 19)  SpO2: 95% (03-20-22 @ 20:09) (95% - 98%)    Labs:                        9.7    6.63  )-----------( 110      ( 20 Mar 2022 07:39 )             28.6     03-20    126<L>  |  89<L>  |  19  ----------------------------<  113<H>  4.1   |  27  |  0.7    Ca    8.3<L>      20 Mar 2022 07:39  Mg     1.9     03-20    TPro  5.1<L>  /  Alb  2.8<L>  /  TBili  1.2  /  DBili  x   /  AST  15  /  ALT  9   /  AlkPhos  85  03-20    PT/INR - ( 20 Mar 2022 20:00 )   PT: 13.30 sec;   INR: 1.16 ratio         PTT - ( 20 Mar 2022 20:00 )  PTT:34.3 sec  Type and Screen X 2:    COVID-19 PCR: NotDetec (16 Mar 2022 14:41)  COVID-19 PCR: NotDetec (13 Jan 2022 16:58)    [ ]Pregnancy test ( if female of childbearing age) : N/A  [ ]EKG: done  [ ]CXR: done      DIET: NPO after midnight  IVF: per primary team      ANTICOAGULATION STATUS ( include name of anticoagulant) :  Received LVX 3/20/22 AM, hold on AM of 3/21/22 for OR    A/P: Patient is a 87y y/o Female Pending right femur open reduction internal fixation    [ ] NPO and IVF @ midnight  [ ]pain control/analgesia prn per primary team   [ ]Incentive Spirometry   [x]F/U Clearance - cleared  [ ]F/U Pending Labs - f/u AM H/H, recommend transfusion if Hb<9  [ ]Notify Ortho with any questions- spectra [id number]    [x]DISCUSSED WITH PRIMARY TEAM MEMBER (name of team member): Alvin  [x]Date and Time DISCUSSED WITH PRIMARY TEAM MEMBER: 3/20/22, 11:45 PM

## 2022-03-21 LAB
ALBUMIN SERPL ELPH-MCNC: 2.8 G/DL — LOW (ref 3.5–5.2)
ALP SERPL-CCNC: 95 U/L — SIGNIFICANT CHANGE UP (ref 30–115)
ALT FLD-CCNC: 11 U/L — SIGNIFICANT CHANGE UP (ref 0–41)
ANION GAP SERPL CALC-SCNC: 10 MMOL/L — SIGNIFICANT CHANGE UP (ref 7–14)
ANION GAP SERPL CALC-SCNC: 14 MMOL/L — SIGNIFICANT CHANGE UP (ref 7–14)
AST SERPL-CCNC: 17 U/L — SIGNIFICANT CHANGE UP (ref 0–41)
BASOPHILS # BLD AUTO: 0.02 K/UL — SIGNIFICANT CHANGE UP (ref 0–0.2)
BASOPHILS NFR BLD AUTO: 0.3 % — SIGNIFICANT CHANGE UP (ref 0–1)
BILIRUB SERPL-MCNC: 1 MG/DL — SIGNIFICANT CHANGE UP (ref 0.2–1.2)
BUN SERPL-MCNC: 21 MG/DL — HIGH (ref 10–20)
BUN SERPL-MCNC: 24 MG/DL — HIGH (ref 10–20)
CALCIUM SERPL-MCNC: 8.3 MG/DL — LOW (ref 8.5–10.1)
CALCIUM SERPL-MCNC: 8.3 MG/DL — LOW (ref 8.5–10.1)
CHLORIDE SERPL-SCNC: 91 MMOL/L — LOW (ref 98–110)
CHLORIDE SERPL-SCNC: 92 MMOL/L — LOW (ref 98–110)
CO2 SERPL-SCNC: 23 MMOL/L — SIGNIFICANT CHANGE UP (ref 17–32)
CO2 SERPL-SCNC: 27 MMOL/L — SIGNIFICANT CHANGE UP (ref 17–32)
CREAT SERPL-MCNC: 0.8 MG/DL — SIGNIFICANT CHANGE UP (ref 0.7–1.5)
CREAT SERPL-MCNC: 0.8 MG/DL — SIGNIFICANT CHANGE UP (ref 0.7–1.5)
EGFR: 71 ML/MIN/1.73M2 — SIGNIFICANT CHANGE UP
EGFR: 71 ML/MIN/1.73M2 — SIGNIFICANT CHANGE UP
EOSINOPHIL # BLD AUTO: 0.05 K/UL — SIGNIFICANT CHANGE UP (ref 0–0.7)
EOSINOPHIL NFR BLD AUTO: 0.7 % — SIGNIFICANT CHANGE UP (ref 0–8)
GLUCOSE SERPL-MCNC: 115 MG/DL — HIGH (ref 70–99)
GLUCOSE SERPL-MCNC: 150 MG/DL — HIGH (ref 70–99)
HCT VFR BLD CALC: 27.9 % — LOW (ref 37–47)
HCT VFR BLD CALC: 28.7 % — LOW (ref 37–47)
HGB BLD-MCNC: 9 G/DL — LOW (ref 12–16)
HGB BLD-MCNC: 9.5 G/DL — LOW (ref 12–16)
IMM GRANULOCYTES NFR BLD AUTO: 0.3 % — SIGNIFICANT CHANGE UP (ref 0.1–0.3)
LYMPHOCYTES # BLD AUTO: 0.9 K/UL — LOW (ref 1.2–3.4)
LYMPHOCYTES # BLD AUTO: 11.7 % — LOW (ref 20.5–51.1)
MAGNESIUM SERPL-MCNC: 1.8 MG/DL — SIGNIFICANT CHANGE UP (ref 1.8–2.4)
MCHC RBC-ENTMCNC: 28.2 PG — SIGNIFICANT CHANGE UP (ref 27–31)
MCHC RBC-ENTMCNC: 28.5 PG — SIGNIFICANT CHANGE UP (ref 27–31)
MCHC RBC-ENTMCNC: 32.3 G/DL — SIGNIFICANT CHANGE UP (ref 32–37)
MCHC RBC-ENTMCNC: 33.1 G/DL — SIGNIFICANT CHANGE UP (ref 32–37)
MCV RBC AUTO: 86.2 FL — SIGNIFICANT CHANGE UP (ref 81–99)
MCV RBC AUTO: 87.5 FL — SIGNIFICANT CHANGE UP (ref 81–99)
MONOCYTES # BLD AUTO: 0.99 K/UL — HIGH (ref 0.1–0.6)
MONOCYTES NFR BLD AUTO: 12.9 % — HIGH (ref 1.7–9.3)
NEUTROPHILS # BLD AUTO: 5.71 K/UL — SIGNIFICANT CHANGE UP (ref 1.4–6.5)
NEUTROPHILS NFR BLD AUTO: 74.1 % — SIGNIFICANT CHANGE UP (ref 42.2–75.2)
NRBC # BLD: 0 /100 WBCS — SIGNIFICANT CHANGE UP (ref 0–0)
NRBC # BLD: 0 /100 WBCS — SIGNIFICANT CHANGE UP (ref 0–0)
PLATELET # BLD AUTO: 142 K/UL — SIGNIFICANT CHANGE UP (ref 130–400)
PLATELET # BLD AUTO: 176 K/UL — SIGNIFICANT CHANGE UP (ref 130–400)
POTASSIUM SERPL-MCNC: 4.2 MMOL/L — SIGNIFICANT CHANGE UP (ref 3.5–5)
POTASSIUM SERPL-MCNC: 4.7 MMOL/L — SIGNIFICANT CHANGE UP (ref 3.5–5)
POTASSIUM SERPL-SCNC: 4.2 MMOL/L — SIGNIFICANT CHANGE UP (ref 3.5–5)
POTASSIUM SERPL-SCNC: 4.7 MMOL/L — SIGNIFICANT CHANGE UP (ref 3.5–5)
PROT SERPL-MCNC: 4.8 G/DL — LOW (ref 6–8)
RBC # BLD: 3.19 M/UL — LOW (ref 4.2–5.4)
RBC # BLD: 3.33 M/UL — LOW (ref 4.2–5.4)
RBC # FLD: 21.3 % — HIGH (ref 11.5–14.5)
RBC # FLD: 21.9 % — HIGH (ref 11.5–14.5)
SODIUM SERPL-SCNC: 128 MMOL/L — LOW (ref 135–146)
SODIUM SERPL-SCNC: 129 MMOL/L — LOW (ref 135–146)
WBC # BLD: 14.85 K/UL — HIGH (ref 4.8–10.8)
WBC # BLD: 7.69 K/UL — SIGNIFICANT CHANGE UP (ref 4.8–10.8)
WBC # FLD AUTO: 14.85 K/UL — HIGH (ref 4.8–10.8)
WBC # FLD AUTO: 7.69 K/UL — SIGNIFICANT CHANGE UP (ref 4.8–10.8)

## 2022-03-21 PROCEDURE — 99232 SBSQ HOSP IP/OBS MODERATE 35: CPT

## 2022-03-21 RX ORDER — HYDROMORPHONE HYDROCHLORIDE 2 MG/ML
0.5 INJECTION INTRAMUSCULAR; INTRAVENOUS; SUBCUTANEOUS
Refills: 0 | Status: DISCONTINUED | OUTPATIENT
Start: 2022-03-21 | End: 2022-03-22

## 2022-03-21 RX ORDER — IRON SUCROSE 20 MG/ML
200 INJECTION, SOLUTION INTRAVENOUS EVERY 24 HOURS
Refills: 0 | Status: DISCONTINUED | OUTPATIENT
Start: 2022-03-21 | End: 2022-03-21

## 2022-03-21 RX ORDER — ONDANSETRON 8 MG/1
4 TABLET, FILM COATED ORAL EVERY 6 HOURS
Refills: 0 | Status: DISCONTINUED | OUTPATIENT
Start: 2022-03-21 | End: 2022-03-31

## 2022-03-21 RX ORDER — SODIUM CHLORIDE 9 MG/ML
1000 INJECTION, SOLUTION INTRAVENOUS
Refills: 0 | Status: DISCONTINUED | OUTPATIENT
Start: 2022-03-21 | End: 2022-03-22

## 2022-03-21 RX ORDER — PREDNISOLONE SODIUM PHOSPHATE 1 %
1 DROPS OPHTHALMIC (EYE) DAILY
Refills: 0 | Status: DISCONTINUED | OUTPATIENT
Start: 2022-03-21 | End: 2022-03-31

## 2022-03-21 RX ORDER — HYDROMORPHONE HYDROCHLORIDE 2 MG/ML
1 INJECTION INTRAMUSCULAR; INTRAVENOUS; SUBCUTANEOUS
Refills: 0 | Status: DISCONTINUED | OUTPATIENT
Start: 2022-03-21 | End: 2022-03-22

## 2022-03-21 RX ORDER — SENNA PLUS 8.6 MG/1
2 TABLET ORAL AT BEDTIME
Refills: 0 | Status: DISCONTINUED | OUTPATIENT
Start: 2022-03-21 | End: 2022-03-31

## 2022-03-21 RX ORDER — IRON SUCROSE 20 MG/ML
200 INJECTION, SOLUTION INTRAVENOUS EVERY 24 HOURS
Refills: 0 | Status: COMPLETED | OUTPATIENT
Start: 2022-03-21 | End: 2022-03-24

## 2022-03-21 RX ORDER — HYDROMORPHONE HYDROCHLORIDE 2 MG/ML
30 INJECTION INTRAMUSCULAR; INTRAVENOUS; SUBCUTANEOUS
Refills: 0 | Status: DISCONTINUED | OUTPATIENT
Start: 2022-03-21 | End: 2022-03-26

## 2022-03-21 RX ORDER — NALOXONE HYDROCHLORIDE 4 MG/.1ML
0.1 SPRAY NASAL
Refills: 0 | Status: DISCONTINUED | OUTPATIENT
Start: 2022-03-21 | End: 2022-03-31

## 2022-03-21 RX ORDER — ENOXAPARIN SODIUM 100 MG/ML
40 INJECTION SUBCUTANEOUS EVERY 24 HOURS
Refills: 0 | Status: DISCONTINUED | OUTPATIENT
Start: 2022-03-21 | End: 2022-03-28

## 2022-03-21 RX ORDER — ONDANSETRON 8 MG/1
4 TABLET, FILM COATED ORAL ONCE
Refills: 0 | Status: DISCONTINUED | OUTPATIENT
Start: 2022-03-21 | End: 2022-03-22

## 2022-03-21 RX ORDER — SIMVASTATIN 20 MG/1
20 TABLET, FILM COATED ORAL AT BEDTIME
Refills: 0 | Status: DISCONTINUED | OUTPATIENT
Start: 2022-03-21 | End: 2022-03-31

## 2022-03-21 RX ORDER — METOPROLOL TARTRATE 50 MG
12.5 TABLET ORAL DAILY
Refills: 0 | Status: DISCONTINUED | OUTPATIENT
Start: 2022-03-21 | End: 2022-03-23

## 2022-03-21 RX ORDER — CEFAZOLIN SODIUM 1 G
2000 VIAL (EA) INJECTION EVERY 8 HOURS
Refills: 0 | Status: COMPLETED | OUTPATIENT
Start: 2022-03-21 | End: 2022-03-22

## 2022-03-21 RX ORDER — CHOLECALCIFEROL (VITAMIN D3) 125 MCG
2000 CAPSULE ORAL DAILY
Refills: 0 | Status: DISCONTINUED | OUTPATIENT
Start: 2022-03-21 | End: 2022-03-31

## 2022-03-21 RX ORDER — MORPHINE SULFATE 50 MG/1
2 CAPSULE, EXTENDED RELEASE ORAL EVERY 4 HOURS
Refills: 0 | Status: DISCONTINUED | OUTPATIENT
Start: 2022-03-21 | End: 2022-03-25

## 2022-03-21 RX ORDER — ACETAMINOPHEN 500 MG
650 TABLET ORAL EVERY 6 HOURS
Refills: 0 | Status: DISCONTINUED | OUTPATIENT
Start: 2022-03-21 | End: 2022-03-31

## 2022-03-21 RX ORDER — PANTOPRAZOLE SODIUM 20 MG/1
40 TABLET, DELAYED RELEASE ORAL
Refills: 0 | Status: DISCONTINUED | OUTPATIENT
Start: 2022-03-21 | End: 2022-03-28

## 2022-03-21 RX ADMIN — SODIUM CHLORIDE 100 MILLILITER(S): 9 INJECTION, SOLUTION INTRAVENOUS at 19:30

## 2022-03-21 RX ADMIN — PANTOPRAZOLE SODIUM 40 MILLIGRAM(S): 20 TABLET, DELAYED RELEASE ORAL at 05:54

## 2022-03-21 RX ADMIN — MORPHINE SULFATE 2 MILLIGRAM(S): 50 CAPSULE, EXTENDED RELEASE ORAL at 10:40

## 2022-03-21 RX ADMIN — Medication 12.5 MILLIGRAM(S): at 05:56

## 2022-03-21 RX ADMIN — HYDROMORPHONE HYDROCHLORIDE 1 MILLIGRAM(S): 2 INJECTION INTRAMUSCULAR; INTRAVENOUS; SUBCUTANEOUS at 20:30

## 2022-03-21 RX ADMIN — ENOXAPARIN SODIUM 40 MILLIGRAM(S): 100 INJECTION SUBCUTANEOUS at 21:48

## 2022-03-21 RX ADMIN — HYDROMORPHONE HYDROCHLORIDE 1 MILLIGRAM(S): 2 INJECTION INTRAMUSCULAR; INTRAVENOUS; SUBCUTANEOUS at 20:06

## 2022-03-21 RX ADMIN — Medication 100 MILLIGRAM(S): at 21:46

## 2022-03-21 RX ADMIN — SIMVASTATIN 20 MILLIGRAM(S): 20 TABLET, FILM COATED ORAL at 21:53

## 2022-03-21 RX ADMIN — MORPHINE SULFATE 2 MILLIGRAM(S): 50 CAPSULE, EXTENDED RELEASE ORAL at 10:05

## 2022-03-21 RX ADMIN — HYDROMORPHONE HYDROCHLORIDE 30 MILLILITER(S): 2 INJECTION INTRAMUSCULAR; INTRAVENOUS; SUBCUTANEOUS at 21:31

## 2022-03-21 RX ADMIN — SENNA PLUS 2 TABLET(S): 8.6 TABLET ORAL at 21:53

## 2022-03-21 NOTE — BRIEF OPERATIVE NOTE - NSICDXBRIEFPROCEDURE_GEN_ALL_CORE_FT
PROCEDURES:  ORIF, fracture, distal femur 21-Mar-2022 19:36:55 periprosthetic distal femur fracture, CPT code 89323 -83529 (modified by retained periprosthetic fracture, required longer surgical time, more fixation, greater blood loss akin to ORIF supracondylar femur fracture with intracondylar extension, CPT code 09707) Tevin Tiwari

## 2022-03-21 NOTE — BRIEF OPERATIVE NOTE - NSICDXBRIEFPOSTOP_GEN_ALL_CORE_FT
POST-OP DIAGNOSIS:  Displaced supracondylar fracture of distal end of right femur without intracondylar extension 21-Mar-2022 19:40:55 periprosthetic femur fracture Tevin Tiwari

## 2022-03-21 NOTE — BRIEF OPERATIVE NOTE - OPERATION/FINDINGS
above fracture; retained prosthesis; WBAT when wounds will tolerate; Abx and DVT per protocol  Dict:  Implants: Synthes 14 locking condylkar buttress plate with 9 x 5.0 locking screws; 12 hole 4.5mm curved LCDC plate with 1 x 4.5mm cortical, 4 x 5.0 locking screws above fracture; retained prosthesis; WBAT when wounds will tolerate; Abx and DVT per protocol  Dict:[id number]  Implants: Synthes 14 locking condylkar buttress plate with 9 x 5.0 locking screws; 12 hole 4.5mm curved LCDC plate with 1 x 4.5mm cortical, 4 x 5.0 locking screws

## 2022-03-21 NOTE — BRIEF OPERATIVE NOTE - NSICDXBRIEFPREOP_GEN_ALL_CORE_FT
PRE-OP DIAGNOSIS:  Displaced supracondylar fracture of distal end of right femur without intracondylar extension 21-Mar-2022 19:40:20 periprosthetic femur fracture Tevin Tiwari

## 2022-03-21 NOTE — PROGRESS NOTE ADULT - SUBJECTIVE AND OBJECTIVE BOX
ORTHO POST OP NOTE    Procedure: Right Femur Open Reduction with internal fixation         Patient seen and examined at bedside. No acute events since OR. Resting without complaints. Pain controlled with medication. Denies chest pain, SOB, N/V.    T(C): 36.8 (03-21-22 @ 21:30), Max: 37.7 (03-21-22 @ 12:27)  HR: 68 (03-21-22 @ 21:45) (68 - 83)  BP: 125/59 (03-21-22 @ 21:45) (112/69 - 140/62)  RR: 16 (03-21-22 @ 21:45) (16 - 18)  SpO2: 100% (03-21-22 @ 21:45) (95% - 100%)  Wt(kg): --    Exam:  Alert and Crawford, No Acute Distress    RLE  Dressing  C/D/I  Compartments soft/compressible  Calves soft  EHL/FHL Motor intact  SILT distally  Ext wwp                           9.0<L>  14.85<H> )-----------( 176      ( 21 Mar 2022 19:55 )             27.9<L>     03-21    128<L>  |  91<L>  |  24<H>  ----------------------------<  150<H>  4.7   |  23  |  0.8      PT/INR - ( 20 Mar 2022 20:00 )   PT: 13.30 sec;   INR: 1.16 ratio         PTT - ( 20 Mar 2022 20:00 )  PTT:34.3 sec      A/P: 87yF S/p R. Femur ORIF. DOing well. Postop hemoglobin 9.0. Doing well. Postop abx confirmed. Dvt ppx ongoing         -Periop antibiotics  -PT/OT  -WBAT RLE  -OOBTC  -F/U AM Labs  -DVT PPx  -Pain Control  -SCDs  -IS  -Continue Current Tx  -Dispo planning

## 2022-03-21 NOTE — PROGRESS NOTE ADULT - SUBJECTIVE AND OBJECTIVE BOX
24H events:    Patient is a 87y old Female who presents with a chief complaint of fall  fx (18 Mar 2022 19:06)    Primary diagnosis of Displaced fracture of right femur       Today is hospital day 5d. This morning patient was seen and examined at bedside, resting comfortably in bed.  she was delirious overnight, tried to get out of bed. She was re-oriented by RN . no other complaints.       PAST MEDICAL & SURGICAL HISTORY  High cholesterol    Kidney stone    Bladder polyp    Shingles  affecting right eye    Atrial flutter    H/O cystoscopy    H/O total knee replacement, right    H/O total knee replacement, left    Cancer of mouth    History of cardioversion      SOCIAL HISTORY:  Social History:      ALLERGIES:  No Known Allergies    MEDICATIONS:  STANDING MEDICATIONS  cholecalciferol 2000 Unit(s) Oral daily  iron sucrose IVPB 200 milliGRAM(s) IV Intermittent every 24 hours  metoprolol succinate ER 12.5 milliGRAM(s) Oral daily  pantoprazole    Tablet 40 milliGRAM(s) Oral before breakfast  prednisoLONE acetate 1% Suspension 1 Drop(s) Right EYE daily  senna 2 Tablet(s) Oral at bedtime  simvastatin 20 milliGRAM(s) Oral at bedtime    PRN MEDICATIONS  acetaminophen     Tablet .. 650 milliGRAM(s) Oral every 6 hours PRN  morphine  - Injectable 2 milliGRAM(s) IV Push every 4 hours PRN        LABS:                        9.5    7.69  )-----------( 142      ( 21 Mar 2022 08:00 )             28.7     03-21    129<L>  |  92<L>  |  21<H>  ----------------------------<  115<H>  4.2   |  27  |  0.8    Ca    8.3<L>      21 Mar 2022 08:00  Mg     1.8     03-21    TPro  4.8<L>  /  Alb  2.8<L>  /  TBili  1.0  /  DBili  x   /  AST  17  /  ALT  11  /  AlkPhos  95  03-21    PT/INR - ( 20 Mar 2022 20:00 )   PT: 13.30 sec;   INR: 1.16 ratio         PTT - ( 20 Mar 2022 20:00 )  PTT:34.3 sec              RADIOLOGY:    VITALS:   T(F): 98.6  HR: 80  BP: 119/57  RR: 18  SpO2: 95%    PHYSICAL EXAM:  GENERAL: NAD, well-groomed, well-developed  HEAD:  Atraumatic, Normocephalic  EYES: EOMI  NECK: Supple  NERVOUS SYSTEM:  Alert & Oriented X3, non focal   CHEST/LUNG: Clear to auscultation bilaterally; No rales, rhonchi, wheezing, or rubs  HEART: Regular rate and rhythm; No murmurs, rubs, or gallops  ABDOMEN: Soft, Nontender, Nondistended; Bowel sounds present  EXTREMITIES:  2+ Peripheral Pulses, No clubbing, cyanosis, or edema  LYMPH: No lymphadenopathy noted  SKIN: No rashes or lesions       24H events:    Patient is a 87y old Female who presents with a chief complaint of fall(18 Mar 2022 19:06)    Primary diagnosis of Displaced fracture of right femur       Today is hospital day 5d. This morning patient was seen and examined at bedside, resting comfortably in bed.  she was delirious overnight, tried to get out of bed. She was re-oriented by RN with no further delirium . otherwise night was uneventfully no complaints.       PAST MEDICAL & SURGICAL HISTORY  High cholesterol    Kidney stone    Bladder polyp    Shingles  affecting right eye    Atrial flutter    H/O cystoscopy    H/O total knee replacement, right    H/O total knee replacement, left    Cancer of mouth    History of cardioversion      SOCIAL HISTORY:  Social History:      ALLERGIES:  No Known Allergies    MEDICATIONS:  STANDING MEDICATIONS  cholecalciferol 2000 Unit(s) Oral daily  iron sucrose IVPB 200 milliGRAM(s) IV Intermittent every 24 hours  metoprolol succinate ER 12.5 milliGRAM(s) Oral daily  pantoprazole    Tablet 40 milliGRAM(s) Oral before breakfast  prednisoLONE acetate 1% Suspension 1 Drop(s) Right EYE daily  senna 2 Tablet(s) Oral at bedtime  simvastatin 20 milliGRAM(s) Oral at bedtime    PRN MEDICATIONS  acetaminophen     Tablet .. 650 milliGRAM(s) Oral every 6 hours PRN  morphine  - Injectable 2 milliGRAM(s) IV Push every 4 hours PRN        LABS:                        9.5    7.69  )-----------( 142      ( 21 Mar 2022 08:00 )             28.7     03-21    129<L>  |  92<L>  |  21<H>  ----------------------------<  115<H>  4.2   |  27  |  0.8    Ca    8.3<L>      21 Mar 2022 08:00  Mg     1.8     03-21    TPro  4.8<L>  /  Alb  2.8<L>  /  TBili  1.0  /  DBili  x   /  AST  17  /  ALT  11  /  AlkPhos  95  03-21    PT/INR - ( 20 Mar 2022 20:00 )   PT: 13.30 sec;   INR: 1.16 ratio         PTT - ( 20 Mar 2022 20:00 )  PTT:34.3 sec              RADIOLOGY:    VITALS:   T(F): 98.6  HR: 80  BP: 119/57  RR: 18  SpO2: 95%    PHYSICAL EXAM:  GENERAL: NAD, well-groomed, well-developed  HEAD:  Atraumatic, Normocephalic  EYES: EOMI  NECK: Supple  NERVOUS SYSTEM:  Alert & Oriented X3, non focal   CHEST/LUNG: Clear to auscultation bilaterally; No rales, rhonchi, wheezing, or rubs  HEART: Regular rate and rhythm; No murmurs, rubs, or gallops  ABDOMEN: Soft, Nontender, Nondistended; Bowel sounds present  EXTREMITIES:  2+ Peripheral Pulses, No clubbing, cyanosis, or edema  LYMPH: No lymphadenopathy noted  SKIN: No rashes or lesions

## 2022-03-21 NOTE — PROGRESS NOTE ADULT - ASSESSMENT
88 y/o F a PMH of dyslipidemia, renal stone, shingles, HFpEF, GI bleeds, A-fib (not on AC due to GI bleed) and hypertension presented to the ED after tripping and falling which resulted in a complete and displaced fracture of right femur      # R Periprosthetic Femur Fx  - Plan for OR today  - NWB Right LE, Knee immobilizer  - Displaced fracture  - DVT ppx: SCD, lovenox  - Pain control w 2mg morphine   - medically optimized  - cardiology: Elevated-risk patient for a Moderate-risk surgery/procedure  - Rapid COVID test    # HFpEF not in exacerbation  # PAfib  no longer on AC   -   - c/w Metoprolol Succ ER 12.5 QD  - c/w Lasix 2 times per week/3 times per week alternating   - Ensure Euvolemia (or as close to it as feasible) prior to surgery  - EKG demonstrating NSR    # Anemia suspected 2/2 GIB but workup neg to date  - Iron = 26  - TIBC WNL  -%Fe sat = 6  -Ferratin, folate and B12 WNL   - Check fecal occult immuno     # DLD  - c/w Statin     # h/o Tonsillar Cancer  - s/p surgical resection of mass from soft pallate, ensure anesthesia is aware pt has obturator covering hole in post oropharynx prior to surgery    # DVT PPX: SCD, Lovenoc held for or  # GI PPX: PPI   # Activity : Bedrest  # Full code 86 y/o F a PMH of dyslipidemia, renal stone, shingles, HFpEF, GI bleeds, A-fib (not on AC due to GI bleed) and hypertension presented to the ED after tripping and falling which resulted in a complete and displaced fracture of right femur      # R Periprosthetic Femur Fx  - Plan for OR today  - NWB Right LE, Knee immobilizer  - Displaced fracture  - DVT ppx: SCD, lovenox; Lovenox was held before OR   - Pain control w 2mg morphine   - medically optimized  - cardiology: Elevated-risk patient for a Moderate-risk surgery/procedure  - COVID PCR neg 03/16    # HFpEF not in exacerbation  # PAfib  no longer on AC   - last    - c/w Metoprolol Succ ER 12.5 QD  - c/w Lasix 2 times per week/3 times per week alternating   - Ensure Euvolemia (or as close to it as feasible) prior to surgery  - EKG demonstrating NSR    # Anemia suspected 2/2 GIB but workup neg to date  - Iron = 26  - TIBC WNL  -%Fe sat = 6  -Ferratin, folate and B12 WNL   - Check fecal occult immuno     # DLD  - c/w Statin     # h/o Tonsillar Cancer  - s/p surgical resection of mass from soft pallate, ensure anesthesia is aware pt has obturator covering hole in post oropharynx prior to surgery    # DVT PPX: SCD, Lovenox held for or  # GI PPX: PPI   # Activity : Bedrest  # Full code

## 2022-03-21 NOTE — CHART NOTE - NSCHARTNOTEFT_GEN_A_CORE
PACU ANESTHESIA ADMISSION NOTE      Procedure:   Post op diagnosis:      ____  Intubated  TV:______       Rate: ______      FiO2: ______    __x__  Patent Airway    __x__  Full return of protective reflexes    __x__  Full recovery from anesthesia / back to baseline     Vitals:    See Anesthesia Record      Mental Status:  _x___ Awake   __x___ Alert   _____ Drowsy   _____ Sedated    Nausea/Vomiting:  _x___ NO  ______Yes,   __x__ See Post - Op Orders          Pain Scale (0-10):  __0___    Treatment: ____ None    __x__ See Post - Op/PCA Orders    Post - Operative Fluids:   ____ Oral   __x__ See Post - Op Orders    Plan: Discharge:   ____Home       ____x_Floor     _____Critical Care    _____  Other:_________________    Comments: Uneventful anesthesia. Patient transported to PACU awake and responsive, spontaneously breathing and hemodynamically stable. Report given to PACU RN at bedside

## 2022-03-22 LAB
ALBUMIN SERPL ELPH-MCNC: 2.7 G/DL — LOW (ref 3.5–5.2)
ALP SERPL-CCNC: 96 U/L — SIGNIFICANT CHANGE UP (ref 30–115)
ALT FLD-CCNC: 11 U/L — SIGNIFICANT CHANGE UP (ref 0–41)
ANION GAP SERPL CALC-SCNC: 12 MMOL/L — SIGNIFICANT CHANGE UP (ref 7–14)
AST SERPL-CCNC: 24 U/L — SIGNIFICANT CHANGE UP (ref 0–41)
BASOPHILS # BLD AUTO: 0.01 K/UL — SIGNIFICANT CHANGE UP (ref 0–0.2)
BASOPHILS NFR BLD AUTO: 0.1 % — SIGNIFICANT CHANGE UP (ref 0–1)
BILIRUB SERPL-MCNC: 0.7 MG/DL — SIGNIFICANT CHANGE UP (ref 0.2–1.2)
BUN SERPL-MCNC: 30 MG/DL — HIGH (ref 10–20)
CALCIUM SERPL-MCNC: 8.4 MG/DL — LOW (ref 8.5–10.1)
CHLORIDE SERPL-SCNC: 92 MMOL/L — LOW (ref 98–110)
CO2 SERPL-SCNC: 24 MMOL/L — SIGNIFICANT CHANGE UP (ref 17–32)
CREAT SERPL-MCNC: 0.9 MG/DL — SIGNIFICANT CHANGE UP (ref 0.7–1.5)
EGFR: 62 ML/MIN/1.73M2 — SIGNIFICANT CHANGE UP
EOSINOPHIL # BLD AUTO: 0 K/UL — SIGNIFICANT CHANGE UP (ref 0–0.7)
EOSINOPHIL NFR BLD AUTO: 0 % — SIGNIFICANT CHANGE UP (ref 0–8)
GLUCOSE SERPL-MCNC: 148 MG/DL — HIGH (ref 70–99)
HCT VFR BLD CALC: 25.9 % — LOW (ref 37–47)
HGB BLD-MCNC: 8.5 G/DL — LOW (ref 12–16)
IMM GRANULOCYTES NFR BLD AUTO: 0.4 % — HIGH (ref 0.1–0.3)
LYMPHOCYTES # BLD AUTO: 0.67 K/UL — LOW (ref 1.2–3.4)
LYMPHOCYTES # BLD AUTO: 5 % — LOW (ref 20.5–51.1)
MAGNESIUM SERPL-MCNC: 1.9 MG/DL — SIGNIFICANT CHANGE UP (ref 1.8–2.4)
MCHC RBC-ENTMCNC: 28.4 PG — SIGNIFICANT CHANGE UP (ref 27–31)
MCHC RBC-ENTMCNC: 32.8 G/DL — SIGNIFICANT CHANGE UP (ref 32–37)
MCV RBC AUTO: 86.6 FL — SIGNIFICANT CHANGE UP (ref 81–99)
MONOCYTES # BLD AUTO: 0.98 K/UL — HIGH (ref 0.1–0.6)
MONOCYTES NFR BLD AUTO: 7.3 % — SIGNIFICANT CHANGE UP (ref 1.7–9.3)
NEUTROPHILS # BLD AUTO: 11.72 K/UL — HIGH (ref 1.4–6.5)
NEUTROPHILS NFR BLD AUTO: 87.2 % — HIGH (ref 42.2–75.2)
NRBC # BLD: 0 /100 WBCS — SIGNIFICANT CHANGE UP (ref 0–0)
PLATELET # BLD AUTO: 160 K/UL — SIGNIFICANT CHANGE UP (ref 130–400)
POTASSIUM SERPL-MCNC: 4.7 MMOL/L — SIGNIFICANT CHANGE UP (ref 3.5–5)
POTASSIUM SERPL-SCNC: 4.7 MMOL/L — SIGNIFICANT CHANGE UP (ref 3.5–5)
PROT SERPL-MCNC: 5.2 G/DL — LOW (ref 6–8)
RBC # BLD: 2.99 M/UL — LOW (ref 4.2–5.4)
RBC # FLD: 21 % — HIGH (ref 11.5–14.5)
SODIUM SERPL-SCNC: 128 MMOL/L — LOW (ref 135–146)
WBC # BLD: 13.44 K/UL — HIGH (ref 4.8–10.8)
WBC # FLD AUTO: 13.44 K/UL — HIGH (ref 4.8–10.8)

## 2022-03-22 PROCEDURE — 99233 SBSQ HOSP IP/OBS HIGH 50: CPT

## 2022-03-22 RX ADMIN — Medication 100 MILLIGRAM(S): at 05:56

## 2022-03-22 RX ADMIN — Medication 1 DROP(S): at 15:01

## 2022-03-22 RX ADMIN — Medication 12.5 MILLIGRAM(S): at 05:57

## 2022-03-22 RX ADMIN — Medication 2000 UNIT(S): at 14:15

## 2022-03-22 RX ADMIN — SENNA PLUS 2 TABLET(S): 8.6 TABLET ORAL at 22:28

## 2022-03-22 RX ADMIN — ENOXAPARIN SODIUM 40 MILLIGRAM(S): 100 INJECTION SUBCUTANEOUS at 22:28

## 2022-03-22 RX ADMIN — Medication 100 MILLIGRAM(S): at 14:15

## 2022-03-22 RX ADMIN — SIMVASTATIN 20 MILLIGRAM(S): 20 TABLET, FILM COATED ORAL at 22:28

## 2022-03-22 RX ADMIN — IRON SUCROSE 110 MILLIGRAM(S): 20 INJECTION, SOLUTION INTRAVENOUS at 00:30

## 2022-03-22 RX ADMIN — PANTOPRAZOLE SODIUM 40 MILLIGRAM(S): 20 TABLET, DELAYED RELEASE ORAL at 05:56

## 2022-03-22 NOTE — PROGRESS NOTE ADULT - SUBJECTIVE AND OBJECTIVE BOX
24H events:    Patient is a 87y old Female who presents with a chief complaint of R femur fracture (21 Mar 2022 11:30)    Primary diagnosis of Displaced fracture of right femur       Today is hospital day 6d. This morning patient was seen and examined at bedside, day 1 post-op s/p femur ORIF,  resting comfortably in bed.  No overnight events. denies any complaints.     PAST MEDICAL & SURGICAL HISTORY  High cholesterol    Kidney stone    Bladder polyp    Shingles  affecting right eye    Atrial flutter    H/O cystoscopy    H/O total knee replacement, right    H/O total knee replacement, left    Cancer of mouth    History of cardioversion      SOCIAL HISTORY:  Social History:      ALLERGIES:  No Known Allergies    MEDICATIONS:  STANDING MEDICATIONS  ceFAZolin   IVPB 2000 milliGRAM(s) IV Intermittent every 8 hours  cholecalciferol 2000 Unit(s) Oral daily  enoxaparin Injectable 40 milliGRAM(s) SubCutaneous every 24 hours  HYDROmorphone PCA (1 mG/mL) 30 milliLiter(s) PCA Continuous PCA Continuous  iron sucrose IVPB 200 milliGRAM(s) IV Intermittent every 24 hours  metoprolol succinate ER 12.5 milliGRAM(s) Oral daily  pantoprazole    Tablet 40 milliGRAM(s) Oral before breakfast  prednisoLONE acetate 1% Suspension 1 Drop(s) Right EYE daily  senna 2 Tablet(s) Oral at bedtime  simvastatin 20 milliGRAM(s) Oral at bedtime    PRN MEDICATIONS  acetaminophen     Tablet .. 650 milliGRAM(s) Oral every 6 hours PRN  morphine  - Injectable 2 milliGRAM(s) IV Push every 4 hours PRN  naloxone Injectable 0.1 milliGRAM(s) IV Push every 3 minutes PRN  ondansetron Injectable 4 milliGRAM(s) IV Push every 6 hours PRN        LABS:                        8.5    13.44 )-----------( 160      ( 22 Mar 2022 07:35 )             25.9     03-22    128<L>  |  92<L>  |  30<H>  ----------------------------<  148<H>  4.7   |  24  |  0.9    Ca    8.4<L>      22 Mar 2022 07:35  Mg     1.9     03-22    TPro  5.2<L>  /  Alb  2.7<L>  /  TBili  0.7  /  DBili  x   /  AST  24  /  ALT  11  /  AlkPhos  96  03-22    PT/INR - ( 20 Mar 2022 20:00 )   PT: 13.30 sec;   INR: 1.16 ratio         PTT - ( 20 Mar 2022 20:00 )  PTT:34.3 sec              RADIOLOGY:      VITALS:   T(F): 98.9  HR: 82  BP: 122/67  RR: 18  SpO2: 96%    PHYSICAL EXAM:  GENERAL: NAD  HEAD:  Atraumatic, Normocephalic  EYES: EOMI  NECK: Supple  NERVOUS SYSTEM:  Alert & Oriented X3, move all extremities, sensation intact.   CHEST/LUNG: Clear to auscultation bilaterally; No rales, rhonchi, wheezing, or rubs  HEART: Regular rate and rhythm; No murmurs, rubs, or gallops  ABDOMEN: Soft, Nontender, Nondistended; Bowel sounds present  EXTREMITIES: R leg wrapped in dressing, elema 2+, no oozing on dressing.   2+ Peripheral Pulses, No clubbing, cyanosis  LYMPH: No lymphadenopathy noted  SKIN: No rashes or lesions

## 2022-03-22 NOTE — PHYSICAL THERAPY INITIAL EVALUATION ADULT - ASSISTIVE DEVICE FOR TRANSFER: SIT/STAND, REHAB EVAL
First trial with RW, 2nd trial with 2 person max assist standing anterolateral to pt./rolling walker

## 2022-03-22 NOTE — PHYSICAL THERAPY INITIAL EVALUATION ADULT - PERTINENT HX OF CURRENT PROBLEM, REHAB EVAL
88 y/o F a PMH of dyslipidemia, renal stone, shingles, HFpEF, GI bleeds, A-fib (not on AC due to GI bleed) and hypertension presented to the ED after tripping and falling which resulted in a complete and displaced fracture of right femur

## 2022-03-22 NOTE — PROGRESS NOTE ADULT - SUBJECTIVE AND OBJECTIVE BOX
ORTHO PROGRESS NOTE     87yFemale POD #      S/P ORIF, fracture, distal femur        Patient seen and examined at bedside . The patient is awake and alert in NAD. No complaints of chest pain, SOB, N/V.    MEDICATIONS  (STANDING):  ceFAZolin   IVPB 2000 milliGRAM(s) IV Intermittent every 8 hours  cholecalciferol 2000 Unit(s) Oral daily  enoxaparin Injectable 40 milliGRAM(s) SubCutaneous every 24 hours  HYDROmorphone PCA (1 mG/mL) 30 milliLiter(s) PCA Continuous PCA Continuous  iron sucrose IVPB 200 milliGRAM(s) IV Intermittent every 24 hours  metoprolol succinate ER 12.5 milliGRAM(s) Oral daily  pantoprazole    Tablet 40 milliGRAM(s) Oral before breakfast  prednisoLONE acetate 1% Suspension 1 Drop(s) Right EYE daily  senna 2 Tablet(s) Oral at bedtime  simvastatin 20 milliGRAM(s) Oral at bedtime    MEDICATIONS  (PRN):  acetaminophen     Tablet .. 650 milliGRAM(s) Oral every 6 hours PRN Moderate Pain (4 - 6), Severe Pain (7 - 10)  morphine  - Injectable 2 milliGRAM(s) IV Push every 4 hours PRN Moderate Pain (4 - 6)  naloxone Injectable 0.1 milliGRAM(s) IV Push every 3 minutes PRN For ANY of the following changes in patient status:  A. RR LESS THAN 10 breaths per minute, B. Oxygen saturation LESS THAN 90%, C. Sedation score of 6  ondansetron Injectable 4 milliGRAM(s) IV Push every 6 hours PRN Nausea      Vital Signs Last 24 Hrs  T(C): 37.2 (22 Mar 2022 05:20), Max: 37.7 (21 Mar 2022 12:27)  T(F): 98.9 (22 Mar 2022 05:20), Max: 99.8 (21 Mar 2022 12:27)  HR: 82 (22 Mar 2022 05:20) (68 - 83)  BP: 122/67 (22 Mar 2022 05:20) (112/69 - 140/62)  BP(mean): --  RR: 18 (22 Mar 2022 05:20) (16 - 18)  SpO2: 96% (21 Mar 2022 22:30) (95% - 100%)    PE:   RLE   Dressing C/D/I   Compartments soft and compressible  Motor intact distally  SILT distally  CR<2sec  palpable pulses                               8.5    13.44 )-----------( 160      ( 22 Mar 2022 07:35 )             25.9     03-21    128<L>  |  91<L>  |  24<H>  ----------------------------<  150<H>  4.7   |  23  |  0.8    Ca    8.3<L>      21 Mar 2022 19:55  Mg     1.8     03-21    TPro  4.8<L>  /  Alb  2.8<L>  /  TBili  1.0  /  DBili  x   /  AST  17  /  ALT  11  /  AlkPhos  95  03-21    PT/INR - ( 20 Mar 2022 20:00 )   PT: 13.30 sec;   INR: 1.16 ratio         PTT - ( 20 Mar 2022 20:00 )  PTT:34.3 sec      03-21-22 @ 07:01  -  03-22-22 @ 07:00  --------------------------------------------------------  IN: 670 mL / OUT: 0 mL / NET: 670 mL          A/P:  87yF S/p R. Femur ORIF. Doing well. Postop hemoglobin 9.0. AM labs pending. Doing well. Postop abx confirmed. Dvt ppx ongoing         -Periop antibiotics  -PT/OT  -WBAT RLE  -OOBTC  -F/U AM Labs  -DVT PPx  -Pain Control  -SCDs  -IS  -Continue Current Tx  -Dispo planning

## 2022-03-22 NOTE — PHYSICAL THERAPY INITIAL EVALUATION ADULT - LEVEL OF INDEPENDENCE: GAIT, REHAB EVAL
Very unsteady for standing pivot transfer requiring 2 person max assist to maintain stability, unable to tolerate further gait training at this time./maximum assist (25% patients effort)

## 2022-03-22 NOTE — CHART NOTE - NSCHARTNOTEFT_GEN_A_CORE
Pt s/p ORIF yesterday. Retaining 500+cc urine has not urinated all night. Without recinos on arrival.  Straight cath / attempt TOV.  Follow up with bladder scan in 8 hours. Would place recinos if still retaining, and attempt TOV again when pt more ambulatory.

## 2022-03-22 NOTE — PHYSICAL THERAPY INITIAL EVALUATION ADULT - TRANSFER SAFETY CONCERNS NOTED: BED/CHAIR, REHAB EVAL
Poor trunk/LE stability with loss of balance, leaning heavily into PT for support, unsteady with difficulty maintaining upright standing posture/decreased balance during turns/losing balance/decreased weight-shifting ability

## 2022-03-22 NOTE — PHYSICAL THERAPY INITIAL EVALUATION ADULT - TRANSFER SAFETY CONCERNS NOTED: SIT/STAND, REHAB EVAL
Initially leaning posteriorly against bed for support, pt very short in stature increasing difficulty stabilizing from EOB despite bed in lowest position/decreased balance during turns/losing balance/decreased step length/decreased weight-shifting ability

## 2022-03-22 NOTE — PHYSICAL THERAPY INITIAL EVALUATION ADULT - GENERAL OBSERVATIONS, REHAB EVAL
1125 - 1210 Pt rec semifowler in bed with +RLE ace wrap, +primafit, +bed alarm, in NAD with family members x 3 at b/s, 2nd PT Leidy present t/o session who had lengthy discussion with family regarding various rehab settings, general progression of mobility when appropriate, and general rehab goals.

## 2022-03-22 NOTE — PROGRESS NOTE ADULT - ASSESSMENT
88 y/o F a PMH of dyslipidemia, renal stone, shingles, HFpEF, GI bleeds, A-fib (not on AC due to GI bleed) and hypertension presented to the ED after tripping and falling which resulted in a complete and displaced fracture of right femur      # R Periprosthetic Femur Fx  - s/p R ORIF femur 03/21   - ortho following   - c/w post-op Abx: cefazolin   - WBAT RLE  - OOBTC  - DVT ppx: SCD, lovenox  - Pain control       # HFpEF not in exacerbation  # P-Afib  no longer on AC   - last    - c/w Metoprolol Succ ER 12.5 QD  - c/w Lasix 2 times per week/3 times per week alternating   - EKG demonstrating NSR  - Started on IV iron for 5 days as per HF     # Anemia suspected 2/2 GIB but workup neg to date  - s/p 1 PRBC  pre-op for Hgb>10, with lasix 10 IV   - Iron = 26 // TIBC WNL // %Fe sat = 6  -Ferratin, folate and B12 WNL     # Hyponatremia  - 136-->130-->126-->129->128  - check usom, sosm, Cara, Ucreat,   - patient is euvolemic on exam. will encourage increased PO intake and monitor for now  - continue to monitor on serial BMPs    # Hypomagnesia  - 1.9 today , s/p repletement  - continue to trend and monitor      # DLD  - c/w Statin     # h/o Tonsillar Cancer  - s/p surgical resection of mass from soft pallate, ensure anesthesia is aware pt has obturator covering hole in post oropharynx prior to surgery    # DVT PPX: SCD, Lovenox   # GI PPX: PPI   # Activity :OOBTC   # Full code

## 2022-03-22 NOTE — PHYSICAL THERAPY INITIAL EVALUATION ADULT - BED MOBILITY LIMITATIONS, REHAB EVAL
Pt with decreased sitting balance at EOB, requiring CGA to min assist for safety./decreased ability to use legs for bridging/pushing/impaired ability to control trunk for mobility

## 2022-03-23 LAB
ALBUMIN SERPL ELPH-MCNC: 2.6 G/DL — LOW (ref 3.5–5.2)
ALP SERPL-CCNC: 95 U/L — SIGNIFICANT CHANGE UP (ref 30–115)
ALT FLD-CCNC: 11 U/L — SIGNIFICANT CHANGE UP (ref 0–41)
ANION GAP SERPL CALC-SCNC: 11 MMOL/L — SIGNIFICANT CHANGE UP (ref 7–14)
ANION GAP SERPL CALC-SCNC: 12 MMOL/L — SIGNIFICANT CHANGE UP (ref 7–14)
ANISOCYTOSIS BLD QL: SLIGHT — SIGNIFICANT CHANGE UP
APTT BLD: 31.5 SEC — SIGNIFICANT CHANGE UP (ref 27–39.2)
AST SERPL-CCNC: 43 U/L — HIGH (ref 0–41)
BASOPHILS # BLD AUTO: 0 K/UL — SIGNIFICANT CHANGE UP (ref 0–0.2)
BASOPHILS NFR BLD AUTO: 0 % — SIGNIFICANT CHANGE UP (ref 0–1)
BILIRUB SERPL-MCNC: 0.6 MG/DL — SIGNIFICANT CHANGE UP (ref 0.2–1.2)
BUN SERPL-MCNC: 37 MG/DL — HIGH (ref 10–20)
BUN SERPL-MCNC: 39 MG/DL — HIGH (ref 10–20)
CALCIUM SERPL-MCNC: 7.7 MG/DL — LOW (ref 8.5–10.1)
CALCIUM SERPL-MCNC: 7.8 MG/DL — LOW (ref 8.5–10.1)
CHLORIDE SERPL-SCNC: 85 MMOL/L — LOW (ref 98–110)
CHLORIDE SERPL-SCNC: 88 MMOL/L — LOW (ref 98–110)
CO2 SERPL-SCNC: 24 MMOL/L — SIGNIFICANT CHANGE UP (ref 17–32)
CO2 SERPL-SCNC: 24 MMOL/L — SIGNIFICANT CHANGE UP (ref 17–32)
CREAT ?TM UR-MCNC: 56 MG/DL — SIGNIFICANT CHANGE UP
CREAT SERPL-MCNC: 0.9 MG/DL — SIGNIFICANT CHANGE UP (ref 0.7–1.5)
CREAT SERPL-MCNC: 0.9 MG/DL — SIGNIFICANT CHANGE UP (ref 0.7–1.5)
EGFR: 62 ML/MIN/1.73M2 — SIGNIFICANT CHANGE UP
EGFR: 62 ML/MIN/1.73M2 — SIGNIFICANT CHANGE UP
EOSINOPHIL # BLD AUTO: 0 K/UL — SIGNIFICANT CHANGE UP (ref 0–0.7)
EOSINOPHIL NFR BLD AUTO: 0 % — SIGNIFICANT CHANGE UP (ref 0–8)
GLUCOSE SERPL-MCNC: 124 MG/DL — HIGH (ref 70–99)
GLUCOSE SERPL-MCNC: 136 MG/DL — HIGH (ref 70–99)
HCT VFR BLD CALC: 16.7 % — LOW (ref 37–47)
HCT VFR BLD CALC: 21.7 % — LOW (ref 37–47)
HGB BLD-MCNC: 5.4 G/DL — CRITICAL LOW (ref 12–16)
HGB BLD-MCNC: 7.3 G/DL — LOW (ref 12–16)
INR BLD: 1.11 RATIO — SIGNIFICANT CHANGE UP (ref 0.65–1.3)
LYMPHOCYTES # BLD AUTO: 0.77 K/UL — LOW (ref 1.2–3.4)
LYMPHOCYTES # BLD AUTO: 7.8 % — LOW (ref 20.5–51.1)
MACROCYTES BLD QL: SLIGHT — SIGNIFICANT CHANGE UP
MAGNESIUM SERPL-MCNC: 2.1 MG/DL — SIGNIFICANT CHANGE UP (ref 1.8–2.4)
MANUAL SMEAR VERIFICATION: SIGNIFICANT CHANGE UP
MCHC RBC-ENTMCNC: 28.1 PG — SIGNIFICANT CHANGE UP (ref 27–31)
MCHC RBC-ENTMCNC: 28.7 PG — SIGNIFICANT CHANGE UP (ref 27–31)
MCHC RBC-ENTMCNC: 32.3 G/DL — SIGNIFICANT CHANGE UP (ref 32–37)
MCHC RBC-ENTMCNC: 33.6 G/DL — SIGNIFICANT CHANGE UP (ref 32–37)
MCV RBC AUTO: 85.4 FL — SIGNIFICANT CHANGE UP (ref 81–99)
MCV RBC AUTO: 87 FL — SIGNIFICANT CHANGE UP (ref 81–99)
MONOCYTES # BLD AUTO: 0.77 K/UL — HIGH (ref 0.1–0.6)
MONOCYTES NFR BLD AUTO: 7.8 % — SIGNIFICANT CHANGE UP (ref 1.7–9.3)
NEUTROPHILS # BLD AUTO: 8.32 K/UL — HIGH (ref 1.4–6.5)
NEUTROPHILS NFR BLD AUTO: 84.4 % — HIGH (ref 42.2–75.2)
NRBC # BLD: 0 /100 WBCS — SIGNIFICANT CHANGE UP (ref 0–0)
OSMOLALITY SERPL: 264 MOS/KG — LOW (ref 280–301)
OSMOLALITY UR: 156 MOS/KG — SIGNIFICANT CHANGE UP (ref 50–1200)
OSMOLALITY UR: 368 MOS/KG — SIGNIFICANT CHANGE UP (ref 50–1200)
OVALOCYTES BLD QL SMEAR: SLIGHT — SIGNIFICANT CHANGE UP
PLAT MORPH BLD: NORMAL — SIGNIFICANT CHANGE UP
PLATELET # BLD AUTO: 121 K/UL — LOW (ref 130–400)
PLATELET # BLD AUTO: 185 K/UL — SIGNIFICANT CHANGE UP (ref 130–400)
POIKILOCYTOSIS BLD QL AUTO: SLIGHT — SIGNIFICANT CHANGE UP
POLYCHROMASIA BLD QL SMEAR: SLIGHT — SIGNIFICANT CHANGE UP
POTASSIUM SERPL-MCNC: 4.4 MMOL/L — SIGNIFICANT CHANGE UP (ref 3.5–5)
POTASSIUM SERPL-MCNC: 4.8 MMOL/L — SIGNIFICANT CHANGE UP (ref 3.5–5)
POTASSIUM SERPL-SCNC: 4.4 MMOL/L — SIGNIFICANT CHANGE UP (ref 3.5–5)
POTASSIUM SERPL-SCNC: 4.8 MMOL/L — SIGNIFICANT CHANGE UP (ref 3.5–5)
PROT SERPL-MCNC: 4.8 G/DL — LOW (ref 6–8)
PROTHROM AB SERPL-ACNC: 12.7 SEC — SIGNIFICANT CHANGE UP (ref 9.95–12.87)
RBC # BLD: 1.92 M/UL — LOW (ref 4.2–5.4)
RBC # BLD: 2.54 M/UL — LOW (ref 4.2–5.4)
RBC # FLD: 21.3 % — HIGH (ref 11.5–14.5)
RBC # FLD: 22.8 % — HIGH (ref 11.5–14.5)
RBC BLD AUTO: ABNORMAL
SODIUM SERPL-SCNC: 121 MMOL/L — LOW (ref 135–146)
SODIUM SERPL-SCNC: 123 MMOL/L — LOW (ref 135–146)
SODIUM UR-SCNC: 20 MMOL/L — SIGNIFICANT CHANGE UP
SODIUM UR-SCNC: <20 MMOL/L — SIGNIFICANT CHANGE UP
WBC # BLD: 12.7 K/UL — HIGH (ref 4.8–10.8)
WBC # BLD: 9.86 K/UL — SIGNIFICANT CHANGE UP (ref 4.8–10.8)
WBC # FLD AUTO: 12.7 K/UL — HIGH (ref 4.8–10.8)
WBC # FLD AUTO: 9.86 K/UL — SIGNIFICANT CHANGE UP (ref 4.8–10.8)

## 2022-03-23 PROCEDURE — 99233 SBSQ HOSP IP/OBS HIGH 50: CPT

## 2022-03-23 PROCEDURE — 71045 X-RAY EXAM CHEST 1 VIEW: CPT | Mod: 26

## 2022-03-23 RX ORDER — SODIUM CHLORIDE 9 MG/ML
1000 INJECTION INTRAMUSCULAR; INTRAVENOUS; SUBCUTANEOUS ONCE
Refills: 0 | Status: COMPLETED | OUTPATIENT
Start: 2022-03-23 | End: 2022-03-23

## 2022-03-23 RX ADMIN — Medication 2000 UNIT(S): at 11:42

## 2022-03-23 RX ADMIN — IRON SUCROSE 110 MILLIGRAM(S): 20 INJECTION, SOLUTION INTRAVENOUS at 00:28

## 2022-03-23 RX ADMIN — SODIUM CHLORIDE 250 MILLILITER(S): 9 INJECTION INTRAMUSCULAR; INTRAVENOUS; SUBCUTANEOUS at 15:04

## 2022-03-23 RX ADMIN — Medication 12.5 MILLIGRAM(S): at 06:10

## 2022-03-23 RX ADMIN — SENNA PLUS 2 TABLET(S): 8.6 TABLET ORAL at 21:29

## 2022-03-23 RX ADMIN — SIMVASTATIN 20 MILLIGRAM(S): 20 TABLET, FILM COATED ORAL at 21:29

## 2022-03-23 RX ADMIN — PANTOPRAZOLE SODIUM 40 MILLIGRAM(S): 20 TABLET, DELAYED RELEASE ORAL at 06:11

## 2022-03-23 RX ADMIN — ENOXAPARIN SODIUM 40 MILLIGRAM(S): 100 INJECTION SUBCUTANEOUS at 21:29

## 2022-03-23 NOTE — CONSULT NOTE ADULT - ASSESSMENT
IMPRESSION: Rehab of right distal femur fx, s/p orif    PRECAUTIONS: [   ] Cardiac  [   ] Respiratory  [   ] Seizures [   ] Contact Isolation  [   ] Droplet Isolation  [   ] Other    Weight Bearing Status: WBAT RLE    RECOMMENDATION:    Out of Bed to Chair     DVT/Decubiti Prophylaxis    REHAB PLAN:     [ x   ] Bedside P/T 3-5 times a week   [    ]   Bedside O/T  2-3 times a week             [    ] Speech Therapy               [    ]  No Rehab Therapy Indicated   Conditioning/ROM                                    ADL  Bed Mobility                                               Conditioning/ROM  Transfers                                                     Bed Mobility  Sitting /Standing Balance                         Transfers                                        Gait Training                                               Sitting/Standing Balance  Stair Training [   ]Applicable                    Home equipment Eval                                                                        Splinting  [   ] Only      GOALS:   ADL   [    ]   Independent                    Transfers  [  x  ] Independent                          Ambulation  [  x  ] Independent     [   x  ] With device                            [    ]  CG                                                         [    ]  CG                                                                  [    ] CG                            [    ] Min A                                                   [    ] Min A                                                              [    ] Min  A          DISCHARGE PLAN:   [    ]  Good candidate for Intensive Rehabilitation/Hospital based                                             Will tolerate 3hrs Intensive Rehab Daily                                       [     ]  Short Term Rehab in Skilled Nursing Facility                                       [     ]  Home with Outpatient or  services                                         [  x   ]  Possible Candidate for Intensive Hospital based Rehab - family wants [facility]

## 2022-03-23 NOTE — PROGRESS NOTE ADULT - SUBJECTIVE AND OBJECTIVE BOX
----------Daily Progress Note----------    HISTORY OF PRESENT ILLNESS:  Patient is a 87y old Female who presents with a chief complaint of fall (22 Mar 2022 10:16)    Currently admitted to medicine with the primary diagnosis of Displaced fracture of right femur       Today is hospital day 7d.     INTERVAL HOSPITAL COURSE / OVERNIGHT EVENTS:    Hb 5.4 this morning, repeat 7.3, pt asymptomatic.     Review of Systems: Otherwise unremarkable     <<<<<PAST MEDICAL & SURGICAL HISTORY>>>>>  High cholesterol    Kidney stone    Bladder polyp    Shingles  affecting right eye    Atrial flutter    H/O cystoscopy    H/O total knee replacement, right    H/O total knee replacement, left    Cancer of mouth    History of cardioversion      ALLERGIES  No Known Allergies      Home Medications:  Lasix 20 mg oral tablet: 1 tab(s) orally 2 times a week then the following week take it 3 times per week (16 Mar 2022 21:45)  metoprolol succinate 25 mg oral tablet, extended release: 0.5 tab(s) orally once a day (16 Mar 2022 21:45)  prednisoLONE acetate 1% ophthalmic suspension: 1 drop(s) in the right eye once a day (16 Mar 2022 21:45)  Vitamin D2 50 mcg (2000 intl units) oral capsule: 1 cap(s) orally once a day (16 Mar 2022 21:45)        MEDICATIONS  STANDING MEDICATIONS  cholecalciferol 2000 Unit(s) Oral daily  enoxaparin Injectable 40 milliGRAM(s) SubCutaneous every 24 hours  HYDROmorphone PCA (1 mG/mL) 30 milliLiter(s) PCA Continuous PCA Continuous  iron sucrose IVPB 200 milliGRAM(s) IV Intermittent every 24 hours  pantoprazole    Tablet 40 milliGRAM(s) Oral before breakfast  prednisoLONE acetate 1% Suspension 1 Drop(s) Right EYE daily  senna 2 Tablet(s) Oral at bedtime  simvastatin 20 milliGRAM(s) Oral at bedtime    PRN MEDICATIONS  acetaminophen     Tablet .. 650 milliGRAM(s) Oral every 6 hours PRN  morphine  - Injectable 2 milliGRAM(s) IV Push every 4 hours PRN  naloxone Injectable 0.1 milliGRAM(s) IV Push every 3 minutes PRN  ondansetron Injectable 4 milliGRAM(s) IV Push every 6 hours PRN    VITALS:  T(F): 97.1  HR: 65  BP: 92/48  RR: 17  SpO2: 96%    <<<<<LABS>>>>>                        7.3    12.70 )-----------( 185      ( 23 Mar 2022 10:29 )             21.7     03-23    123<L>  |  88<L>  |  37<H>  ----------------------------<  136<H>  4.4   |  24  |  0.9    Ca    7.7<L>      23 Mar 2022 10:31  Mg     2.1     03-23    TPro  4.8<L>  /  Alb  2.6<L>  /  TBili  0.6  /  DBili  x   /  AST  43<H>  /  ALT  11  /  AlkPhos  95  03-23    PT/INR - ( 23 Mar 2022 10:31 )   PT: 12.70 sec;   INR: 1.11 ratio         PTT - ( 23 Mar 2022 10:31 )  PTT:31.5 sec        [id number]        <<<<<RADIOLOGY>>>>>    < from: Xray Chest 1 View- PORTABLE-Routine (Xray Chest 1 View- PORTABLE-Routine in AM.) (03.23.22 @ 06:04) >  PROCEDURE DATE:  03/23/2022          INTERPRETATION:  Clinical History / Reason for exam: Shortness of breath    Comparison : Chest radiograph March 20, 2022.    Technique/Positioning: Frontal portable, low lung volumes.    Findings:    Support devices: None.    Cardiac/mediastinum/hilum: Unremarkable.    Lung parenchyma/Pleura: Minimal atelectasis    Skeleton/soft tissues: Degenerative changes of the shoulders    Impression:    Low lung volumes, atelectasis, unchanged.    < end of copied text >      <<<<<PHYSICAL EXAM>>>>>  GENERAL: resting in bed comfortably  PULMONARY: Clear to auscultation bilaterally. No rales, rhonchi, or wheezing.  CARDIOVASCULAR: Regular rate and rhythm, S1-S2, no murmurs  GASTROINTESTINAL: Soft, non-tender, non-distended, no guarding.        -----------------------------------------------------------------------------------------------------------------------------------------------------------------------------------------------

## 2022-03-23 NOTE — PROGRESS NOTE ADULT - SUBJECTIVE AND OBJECTIVE BOX
87yFemale POD #2     S/P R. Distal Femur ORIF.         Patient seen and examined at bedside . The patient is awake and alert in NAD. No complaints of chest pain, SOB, N/V.    PE:   RLE   Dressing C/D/I, dressings taken down, wounds are c/d/i   Compartments soft and compressible  Motor intact distally  SILT distally  CR<2sec  palpable pulses    A/P:  87yF S/p R. Femur ORIF.  POD2. Doing well. Hemoglobin 8.5.  Doing well. Postop abx confirmed. Dvt ppx ongoing         -Periop antibiotics  -PT/OT  -WBAT RLE  -OOBTC  -F/U AM Labs  -DVT PPx: Remains on lvx 40.   -Pain Control  -SCDs  -IS  -Continue Current Tx  -Dispo planning

## 2022-03-23 NOTE — PROGRESS NOTE ADULT - ASSESSMENT
88 y/o F a PMH of dyslipidemia, renal stone, shingles, HFpEF, GI bleeds, A-fib (not on AC due to GI bleed) and hypertension presented to the ED after tripping and falling which resulted in a complete and displaced fracture of right femur      # R Periprosthetic Femur Fx  - s/p R ORIF femur 03/21   - ortho following   - s/p ancef  - WBAT RLE  - OOBTC  - DVT ppx: SCD, lovenox  - Pain control   -Hb 5.4 this morning, repeat Hb 7.3      # HFpEF not in exacerbation  # P-Afib  no longer on AC   - last    - c/w Metoprolol Succ ER 12.5 QD  - c/w Lasix 2 times per week/3 times per week alternating   - EKG demonstrating NSR  - Started on IV iron for 5 days as per HF     # Anemia suspected 2/2 GIB but workup neg to date  - s/p 1 PRBC  pre-op for Hgb>10, with lasix 10 IV   - Iron = 26 // TIBC WNL // %Fe sat = 6  -Ferratin, folate and B12 WNL     # Hyponatremia  - 136-->130-->126-->129->128->123  - Uosm/Cara ordered  - patient is euvolemic on exam. will encourage increased PO intake and monitor for now  - continue to monitor on serial BMPs    # Hypomagnesia  - 1.9 today , s/p repletement  - continue to trend and monitor      # DLD  - c/w Statin     # h/o Tonsillar Cancer  - s/p surgical resection of mass from soft pallate, ensure anesthesia is aware pt has obturator covering hole in post oropharynx prior to surgery    # DVT PPX: SCD, Lovenox   # GI PPX: PPI   # Activity :OOBTC   # Full code

## 2022-03-23 NOTE — CONSULT NOTE ADULT - SUBJECTIVE AND OBJECTIVE BOX
HPI:  86 F PMH HFpEF, Afib on Eliquis, Anemia suspected to be secondary to GIB ( but never proven s/p neg EGD and C-scope), HTN, and HLD, hx of tonsillar ca s/p resection w/obturator in placeto cover hole in pallate, BIBEMS s/p mechanical fall at home. Pt lives with her daughter and went to her old home to collect some documents and tripped in the doorway and landed on her right knee. Had immediate pain, unable to ambulate afterwards. Pt normally ambulates w/ a cane. Pt underwent left TKA by Dr. Morton in 2007 and a right TKA by Dr. Perkins at [facility] approx 8 years ago. right PATRICK Type II periprosthetic distal femur fracture. Pt placed in a knee immobilizer.     ED COURSE: As above, HD stable  S/P R. Distal Femur ORIF on 3/21. WBAT RLE as per ortho    PAST MEDICAL & SURGICAL HISTORY:  High cholesterol    Kidney stone    Bladder polyp    Shingles  affecting right eye    Atrial flutter    H/O cystoscopy    H/O total knee replacement, right    H/O total knee replacement, left    Cancer of mouth    History of cardioversion        Hospital Course:    TODAY'S SUBJECTIVE & REVIEW OF SYMPTOMS:     Constitutional WNL   Cardio WNL   Resp WNL   GI WNL  Heme WNL  Endo WNL  Skin WNL  MSK pain  Neuro WNL  Cognitive WNL  Psych WNL      MEDICATIONS  (STANDING):  cholecalciferol 2000 Unit(s) Oral daily  enoxaparin Injectable 40 milliGRAM(s) SubCutaneous every 24 hours  HYDROmorphone PCA (1 mG/mL) 30 milliLiter(s) PCA Continuous PCA Continuous  iron sucrose IVPB 200 milliGRAM(s) IV Intermittent every 24 hours  pantoprazole    Tablet 40 milliGRAM(s) Oral before breakfast  prednisoLONE acetate 1% Suspension 1 Drop(s) Right EYE daily  senna 2 Tablet(s) Oral at bedtime  simvastatin 20 milliGRAM(s) Oral at bedtime    MEDICATIONS  (PRN):  acetaminophen     Tablet .. 650 milliGRAM(s) Oral every 6 hours PRN Moderate Pain (4 - 6), Severe Pain (7 - 10)  morphine  - Injectable 2 milliGRAM(s) IV Push every 4 hours PRN Moderate Pain (4 - 6)  naloxone Injectable 0.1 milliGRAM(s) IV Push every 3 minutes PRN For ANY of the following changes in patient status:  A. RR LESS THAN 10 breaths per minute, B. Oxygen saturation LESS THAN 90%, C. Sedation score of 6  ondansetron Injectable 4 milliGRAM(s) IV Push every 6 hours PRN Nausea      FAMILY HISTORY:      Allergies    No Known Allergies    Intolerances        SOCIAL HISTORY:    [  ] Etoh  [  ] Smoking  [  ] Substance abuse     Home Environment:  [   ] Home Alone  [  x ] Lives with Family  [   ] Home Health Aid    Dwelling:  [   ] Apartment  [  x ] Private House  [   ] Adult Home  [   ] Skilled Nursing Facility      [   ] Short Term  [   ] Long Term  [   ] Stairs       Elevator [   ]    FUNCTIONAL STATUS PTA: (Check all that apply)  Ambulation: [  x  ]Independent    [   ] Dependent     [   ] Non-Ambulatory  Assistive Device: [  x ] SA Cane  [   ]  Q Cane  [ x  ] Walker  [   ]  Wheelchair  ADL : [ x  ] Independent  [    ]  Dependent       Vital Signs Last 24 Hrs  T(C): 35.9 (23 Mar 2022 11:55), Max: 36.6 (22 Mar 2022 21:01)  T(F): 96.6 (23 Mar 2022 11:55), Max: 97.8 (22 Mar 2022 21:01)  HR: 60 (23 Mar 2022 11:55) (60 - 80)  BP: 112/54 (23 Mar 2022 11:55) (92/48 - 119/67)  BP(mean): 84 (22 Mar 2022 21:01) (84 - 84)  RR: 16 (23 Mar 2022 11:55) (16 - 18)  SpO2: 97% (23 Mar 2022 11:55) (93% - 97%)      PHYSICAL EXAM: Awake & Alert  GENERAL: NAD  HEAD:  Normocephalic  CHEST/LUNG: Clear   HEART: S1S2+  ABDOMEN: Soft, Nontender  EXTREMITIES:  no left  calf tenderness    NERVOUS SYSTEM:  Cranial Nerves 2-12 intact [   ] Abnormal  [   ]  ROM: WFL all extremities [   ]  Abnormal [ x  ]limited RLE  Motor Strength: WFL all extremities  [   ]  Abnormal [  x ]limited RLE  Sensation: intact to light touch [ x  ] Abnormal [   ]    FUNCTIONAL STATUS:  Bed Mobility: Independent [   ]  Supervision [   ]  Needs Assistance [ x  ]  N/A [   ]  Transfers: Independent [   ]  Supervision [   ]  Needs Assistance [ x  ]  N/A [   ]   Ambulation: Independent [   ]  Supervision [   ]  Needs Assistance [   ]  N/A [   ]  ADL: Independent [   ] Requires Assistance [   ] N/A [   ]      LABS:                        7.3    12.70 )-----------( 185      ( 23 Mar 2022 10:29 )             21.7     03-23    123<L>  |  88<L>  |  37<H>  ----------------------------<  136<H>  4.4   |  24  |  0.9    Ca    7.7<L>      23 Mar 2022 10:31  Mg     2.1     03-23    TPro  4.8<L>  /  Alb  2.6<L>  /  TBili  0.6  /  DBili  x   /  AST  43<H>  /  ALT  11  /  AlkPhos  95  03-23    PT/INR - ( 23 Mar 2022 10:31 )   PT: 12.70 sec;   INR: 1.11 ratio         PTT - ( 23 Mar 2022 10:31 )  PTT:31.5 sec      RADIOLOGY & ADDITIONAL STUDIES:

## 2022-03-24 LAB
ALBUMIN SERPL ELPH-MCNC: 2.5 G/DL — LOW (ref 3.5–5.2)
ALP SERPL-CCNC: 115 U/L — SIGNIFICANT CHANGE UP (ref 30–115)
ALT FLD-CCNC: 14 U/L — SIGNIFICANT CHANGE UP (ref 0–41)
ANION GAP SERPL CALC-SCNC: 15 MMOL/L — HIGH (ref 7–14)
APPEARANCE UR: CLEAR — SIGNIFICANT CHANGE UP
AST SERPL-CCNC: 47 U/L — HIGH (ref 0–41)
BACTERIA # UR AUTO: 0 — SIGNIFICANT CHANGE UP
BASOPHILS # BLD AUTO: 0.01 K/UL — SIGNIFICANT CHANGE UP (ref 0–0.2)
BASOPHILS NFR BLD AUTO: 0.1 % — SIGNIFICANT CHANGE UP (ref 0–1)
BILIRUB SERPL-MCNC: 0.8 MG/DL — SIGNIFICANT CHANGE UP (ref 0.2–1.2)
BILIRUB UR-MCNC: NEGATIVE — SIGNIFICANT CHANGE UP
BUN SERPL-MCNC: 35 MG/DL — HIGH (ref 10–20)
CALCIUM SERPL-MCNC: 7.9 MG/DL — LOW (ref 8.5–10.1)
CHLORIDE SERPL-SCNC: 91 MMOL/L — LOW (ref 98–110)
CO2 SERPL-SCNC: 20 MMOL/L — SIGNIFICANT CHANGE UP (ref 17–32)
COLOR SPEC: SIGNIFICANT CHANGE UP
CREAT SERPL-MCNC: 0.7 MG/DL — SIGNIFICANT CHANGE UP (ref 0.7–1.5)
DIFF PNL FLD: NEGATIVE — SIGNIFICANT CHANGE UP
EGFR: 84 ML/MIN/1.73M2 — SIGNIFICANT CHANGE UP
EOSINOPHIL # BLD AUTO: 0.12 K/UL — SIGNIFICANT CHANGE UP (ref 0–0.7)
EOSINOPHIL NFR BLD AUTO: 1.2 % — SIGNIFICANT CHANGE UP (ref 0–8)
EPI CELLS # UR: 0 /HPF — SIGNIFICANT CHANGE UP (ref 0–5)
GLUCOSE SERPL-MCNC: 92 MG/DL — SIGNIFICANT CHANGE UP (ref 70–99)
GLUCOSE UR QL: NEGATIVE — SIGNIFICANT CHANGE UP
HCT VFR BLD CALC: 23.6 % — LOW (ref 37–47)
HGB BLD-MCNC: 7.5 G/DL — LOW (ref 12–16)
HYALINE CASTS # UR AUTO: 1 /LPF — SIGNIFICANT CHANGE UP (ref 0–7)
IMM GRANULOCYTES NFR BLD AUTO: 1.1 % — HIGH (ref 0.1–0.3)
KETONES UR-MCNC: NEGATIVE — SIGNIFICANT CHANGE UP
LEUKOCYTE ESTERASE UR-ACNC: ABNORMAL
LYMPHOCYTES # BLD AUTO: 1.38 K/UL — SIGNIFICANT CHANGE UP (ref 1.2–3.4)
LYMPHOCYTES # BLD AUTO: 13.8 % — LOW (ref 20.5–51.1)
MAGNESIUM SERPL-MCNC: 2.1 MG/DL — SIGNIFICANT CHANGE UP (ref 1.8–2.4)
MCHC RBC-ENTMCNC: 28.4 PG — SIGNIFICANT CHANGE UP (ref 27–31)
MCHC RBC-ENTMCNC: 31.8 G/DL — LOW (ref 32–37)
MCV RBC AUTO: 89.4 FL — SIGNIFICANT CHANGE UP (ref 81–99)
MONOCYTES # BLD AUTO: 0.85 K/UL — HIGH (ref 0.1–0.6)
MONOCYTES NFR BLD AUTO: 8.5 % — SIGNIFICANT CHANGE UP (ref 1.7–9.3)
NEUTROPHILS # BLD AUTO: 7.56 K/UL — HIGH (ref 1.4–6.5)
NEUTROPHILS NFR BLD AUTO: 75.3 % — HIGH (ref 42.2–75.2)
NITRITE UR-MCNC: NEGATIVE — SIGNIFICANT CHANGE UP
NRBC # BLD: 0 /100 WBCS — SIGNIFICANT CHANGE UP (ref 0–0)
PH UR: 5.5 — SIGNIFICANT CHANGE UP (ref 5–8)
PLATELET # BLD AUTO: 191 K/UL — SIGNIFICANT CHANGE UP (ref 130–400)
POTASSIUM SERPL-MCNC: 4.9 MMOL/L — SIGNIFICANT CHANGE UP (ref 3.5–5)
POTASSIUM SERPL-SCNC: 4.9 MMOL/L — SIGNIFICANT CHANGE UP (ref 3.5–5)
PROT SERPL-MCNC: 4.8 G/DL — LOW (ref 6–8)
PROT UR-MCNC: NEGATIVE — SIGNIFICANT CHANGE UP
RBC # BLD: 2.64 M/UL — LOW (ref 4.2–5.4)
RBC # FLD: 21.7 % — HIGH (ref 11.5–14.5)
RBC CASTS # UR COMP ASSIST: 2 /HPF — SIGNIFICANT CHANGE UP (ref 0–4)
SODIUM SERPL-SCNC: 126 MMOL/L — LOW (ref 135–146)
SP GR SPEC: 1.01 — SIGNIFICANT CHANGE UP (ref 1.01–1.03)
UROBILINOGEN FLD QL: SIGNIFICANT CHANGE UP
WBC # BLD: 10.03 K/UL — SIGNIFICANT CHANGE UP (ref 4.8–10.8)
WBC # FLD AUTO: 10.03 K/UL — SIGNIFICANT CHANGE UP (ref 4.8–10.8)
WBC UR QL: 4 /HPF — SIGNIFICANT CHANGE UP (ref 0–5)

## 2022-03-24 PROCEDURE — 99233 SBSQ HOSP IP/OBS HIGH 50: CPT

## 2022-03-24 RX ORDER — SODIUM CHLORIDE 9 MG/ML
1000 INJECTION INTRAMUSCULAR; INTRAVENOUS; SUBCUTANEOUS
Refills: 0 | Status: DISCONTINUED | OUTPATIENT
Start: 2022-03-24 | End: 2022-03-25

## 2022-03-24 RX ADMIN — SODIUM CHLORIDE 60 MILLILITER(S): 9 INJECTION INTRAMUSCULAR; INTRAVENOUS; SUBCUTANEOUS at 14:29

## 2022-03-24 RX ADMIN — PANTOPRAZOLE SODIUM 40 MILLIGRAM(S): 20 TABLET, DELAYED RELEASE ORAL at 08:34

## 2022-03-24 RX ADMIN — Medication 2000 UNIT(S): at 12:00

## 2022-03-24 RX ADMIN — IRON SUCROSE 110 MILLIGRAM(S): 20 INJECTION, SOLUTION INTRAVENOUS at 00:10

## 2022-03-24 NOTE — PROGRESS NOTE ADULT - SUBJECTIVE AND OBJECTIVE BOX
ORTHO PROGRESS NOTE     87yFemale POD # 3  S/P ORIF, fracture, distal femur        Patient seen and examined at bedside . The patient is awake and alert in NAD. No complaints of chest pain, SOB, N/V.    MEDICATIONS  (STANDING):  cholecalciferol 2000 Unit(s) Oral daily  enoxaparin Injectable 40 milliGRAM(s) SubCutaneous every 24 hours  HYDROmorphone PCA (1 mG/mL) 30 milliLiter(s) PCA Continuous PCA Continuous  pantoprazole    Tablet 40 milliGRAM(s) Oral before breakfast  prednisoLONE acetate 1% Suspension 1 Drop(s) Right EYE daily  senna 2 Tablet(s) Oral at bedtime  simvastatin 20 milliGRAM(s) Oral at bedtime    MEDICATIONS  (PRN):  acetaminophen     Tablet .. 650 milliGRAM(s) Oral every 6 hours PRN Moderate Pain (4 - 6), Severe Pain (7 - 10)  morphine  - Injectable 2 milliGRAM(s) IV Push every 4 hours PRN Moderate Pain (4 - 6)  naloxone Injectable 0.1 milliGRAM(s) IV Push every 3 minutes PRN For ANY of the following changes in patient status:  A. RR LESS THAN 10 breaths per minute, B. Oxygen saturation LESS THAN 90%, C. Sedation score of 6  ondansetron Injectable 4 milliGRAM(s) IV Push every 6 hours PRN Nausea      Vital Signs Last 24 Hrs  T(C): 36 (24 Mar 2022 06:54), Max: 36.1 (23 Mar 2022 21:24)  T(F): 96.8 (24 Mar 2022 06:54), Max: 96.9 (23 Mar 2022 21:24)  HR: 70 (24 Mar 2022 06:54) (58 - 70)  BP: 115/53 (24 Mar 2022 06:54) (100/54 - 115/53)  BP(mean): 76 (23 Mar 2022 21:24) (76 - 76)  RR: 20 (24 Mar 2022 06:54) (16 - 20)  SpO2: 98% (23 Mar 2022 21:24) (97% - 98%)    PE:   NAD, non labored breathing  RLE   Dressing C/D/I  Knee ROM limited, passively 0-45  Compartments soft and compressible  Motor intact distally  SILT distally  CR<2sec  palpable pulses                            7.3    12.70 )-----------( 185      ( 23 Mar 2022 10:29 )             21.7     03-23    123<L>  |  88<L>  |  37<H>  ----------------------------<  136<H>  4.4   |  24  |  0.9    Ca    7.7<L>      23 Mar 2022 10:31  Mg     2.1     03-23    TPro  4.8<L>  /  Alb  2.6<L>  /  TBili  0.6  /  DBili  x   /  AST  43<H>  /  ALT  11  /  AlkPhos  95  03-23    PT/INR - ( 23 Mar 2022 10:31 )   PT: 12.70 sec;   INR: 1.11 ratio         PTT - ( 23 Mar 2022 10:31 )  PTT:31.5 sec      03-23-22 @ 07:01  -  03-24-22 @ 07:00  --------------------------------------------------------  IN: 0 mL / OUT: 1520 mL / NET: -1520 mL          A/P: 87yFemale POD # 3  S/P  ORIF, fracture, distal femur  Hemoglobin 7.5 this AM.  Doing well.     -Periop antibiotics completed  -PT/OT  -WBAT RLE  -Aggressive knee ROM  -OOBTC  -F/U AM Labs  -DVT PPx: Remains on lvx 40.   -Pain Control  -SCDs  -IS  -rest of management per primary  -Dispo planning

## 2022-03-24 NOTE — PROGRESS NOTE ADULT - ASSESSMENT
86 y/o F a PMH of dyslipidemia, renal stone, shingles, HFpEF, GI bleeds, A-fib (not on AC due to GI bleed) and hypertension presented to the ED after tripping and falling which resulted in a complete and displaced fracture of right femur    # R Periprosthetic Femur Fx  - s/p R ORIF femur 03/21   - ortho following   - s/p ancef  - WBAT RLE; OOBTC per Ortho  - PT/Physiatry on board.   - DVT ppx: SCD, lovenox  - Pain control   - Hb 7.3 > 7.5. expected loss from surgery. no transfusion. Monitor.   - Eventual inpatient rehab at [facility] or [facility]    # HFpEF not in exacerbation  # P-Afib  no longer on AC   - last    - 3/23 BP borderline - held home metoprolol 12.5 mg QD > Resume by 3/25 if BP tolerates.   - On Lasix 2 times per week/3 times per week alternating - Holding d/t Hyponatremia.   - EKG demonstrating NSR  - Started on IV iron for 5 days as per HF     # Anemia suspected 2/2 GIB but workup neg to date  - s/p 1 PRBC  pre-op for Hgb>10, with lasix 10 IV   - Iron = 26 // TIBC WNL // %Fe sat = 6  -Ferratin, folate and B12 WNL     # Hyponatremia  - 136-->130-->126-->129->128->123>   - Uosm/Cara ordered  - patient is euvolemic on exam. will encourage increased PO intake and monitor for now  - Received 1L NS over 4 hours. Repeat Na in AM.     # Hypomagnesia  replete prn    # DLD  - c/w Statin     # h/o Tonsillar Cancer  - s/p surgical resection of mass from soft palate, in future ensure anesthesia is aware pt has obturator covering hole in post oropharynx prior to surgery    # DVT PPX: SCD, Lovenox   # GI PPX: PPI   # Activity :OOBTC   # Full code .     88 y/o F a PMH of dyslipidemia, renal stone, shingles, HFpEF, GI bleeds, A-fib (not on AC due to GI bleed) and hypertension presented to the ED after tripping and falling which resulted in a complete and displaced fracture of right femur    # R Periprosthetic Femur Fx  - s/p R ORIF femur 03/21   - ortho following   - s/p ancef  - WBAT RLE; OOBTC per Ortho  - PT/Physiatry on board.   - DVT ppx: SCD, lovenox  - Pain control   - Hb 7.3 > 7.5. expected loss from surgery. no transfusion. Monitor.   - Eventual inpatient rehab at [place] or [facility]    # HFpEF not in exacerbation  # P-Afib  no longer on AC   - last    - 3/23 BP borderline - held home metoprolol 12.5 mg QD > Resume by 3/25 if BP tolerates.   - On Lasix 2 times per week/3 times per week alternating - Holding d/t Hyponatremia.   - EKG demonstrating NSR  - Started on IV iron for 5 days as per HF     # Anemia suspected 2/2 GIB but workup neg to date  - s/p 1 PRBC  pre-op for Hgb>10, with lasix 10 IV   - Iron = 26 // TIBC WNL // %Fe sat = 6  -Ferratin, folate and B12 WNL     # Hyponatremia  - 136-->130-->126-->129->128->123> 126.   NS @ 60cc/hr from 3/24.   - Uosm/Cara ordered  - patient is euvolemic on exam. will encourage increased PO intake and monitor for now      # Hypomagnesia  replete prn    # DLD  - c/w Statin     # h/o Tonsillar Cancer  - s/p surgical resection of mass from soft palate, in future ensure anesthesia is aware pt has obturator covering hole in post oropharynx prior to surgery    # DVT PPX: SCD, Lovenox   # GI PPX: PPI   # Activity :OOBTC   # Full code .    Patient needs cardiology clearance for [facility] [employer] [facility]. Dr. Whiteside consulted.

## 2022-03-24 NOTE — PROGRESS NOTE ADULT - SUBJECTIVE AND OBJECTIVE BOX
S: Pain controlled  no n/v/f/c      All other pertinent ROS negative.      03-23-22 @ 07:01  -  03-24-22 @ 07:00  --------------------------------------------------------  IN: 0 mL / OUT: 1520 mL / NET: -1520 mL      Vital Signs Last 24 Hrs  T(C): 36 (24 Mar 2022 06:54), Max: 36.1 (23 Mar 2022 21:24)  T(F): 96.8 (24 Mar 2022 06:54), Max: 96.9 (23 Mar 2022 21:24)  HR: 70 (24 Mar 2022 06:54) (58 - 70)  BP: 115/53 (24 Mar 2022 06:54) (100/54 - 115/53)  BP(mean): 76 (23 Mar 2022 21:24) (76 - 76)  RR: 20 (24 Mar 2022 06:54) (16 - 20)  SpO2: 98% (23 Mar 2022 21:24) (97% - 98%)  PHYSICAL EXAM:    Constitutional: NAD, awake and alert, well-developed  HEENT: PERR, EOMI, Normal Hearing, MMM  Neck: Soft and supple, No LAD, No JVD  Respiratory: Breath sounds are clear bilaterally, No wheezing, rales or rhonchi  Cardiovascular: S1 and S2, regular rate and rhythm, no Murmurs, gallops or rubs  Gastrointestinal: Bowel Sounds present, soft, nontender, nondistended, no guarding, no rebound  Extremities: Right femur wound c/d/i    MEDICATIONS:  MEDICATIONS  (STANDING):  cholecalciferol 2000 Unit(s) Oral daily  enoxaparin Injectable 40 milliGRAM(s) SubCutaneous every 24 hours  HYDROmorphone PCA (1 mG/mL) 30 milliLiter(s) PCA Continuous PCA Continuous  pantoprazole    Tablet 40 milliGRAM(s) Oral before breakfast  prednisoLONE acetate 1% Suspension 1 Drop(s) Right EYE daily  senna 2 Tablet(s) Oral at bedtime  simvastatin 20 milliGRAM(s) Oral at bedtime      LABS: All Labs Reviewed:                        7.5    10.03 )-----------( 191      ( 24 Mar 2022 06:15 )             23.6     03-23    123<L>  |  88<L>  |  37<H>  ----------------------------<  136<H>  4.4   |  24  |  0.9    Ca    7.7<L>      23 Mar 2022 10:31  Mg     2.1     03-23    TPro  4.8<L>  /  Alb  2.6<L>  /  TBili  0.6  /  DBili  x   /  AST  43<H>  /  ALT  11  /  AlkPhos  95  03-23    PT/INR - ( 23 Mar 2022 10:31 )   PT: 12.70 sec;   INR: 1.11 ratio         PTT - ( 23 Mar 2022 10:31 )  PTT:31.5 sec      Blood Culture:     Radiology: reviewed

## 2022-03-25 LAB
ALBUMIN SERPL ELPH-MCNC: 2.1 G/DL — LOW (ref 3.5–5.2)
ALBUMIN SERPL ELPH-MCNC: 2.3 G/DL — LOW (ref 3.5–5.2)
ALBUMIN SERPL ELPH-MCNC: 2.3 G/DL — LOW (ref 3.5–5.2)
ALP SERPL-CCNC: 102 U/L — SIGNIFICANT CHANGE UP (ref 30–115)
ALP SERPL-CCNC: 108 U/L — SIGNIFICANT CHANGE UP (ref 30–115)
ALP SERPL-CCNC: 117 U/L — HIGH (ref 30–115)
ALT FLD-CCNC: 11 U/L — SIGNIFICANT CHANGE UP (ref 0–41)
ALT FLD-CCNC: 13 U/L — SIGNIFICANT CHANGE UP (ref 0–41)
ALT FLD-CCNC: 14 U/L — SIGNIFICANT CHANGE UP (ref 0–41)
ANION GAP SERPL CALC-SCNC: 12 MMOL/L — SIGNIFICANT CHANGE UP (ref 7–14)
ANION GAP SERPL CALC-SCNC: 8 MMOL/L — SIGNIFICANT CHANGE UP (ref 7–14)
ANION GAP SERPL CALC-SCNC: 9 MMOL/L — SIGNIFICANT CHANGE UP (ref 7–14)
AST SERPL-CCNC: 30 U/L — SIGNIFICANT CHANGE UP (ref 0–41)
AST SERPL-CCNC: 32 U/L — SIGNIFICANT CHANGE UP (ref 0–41)
AST SERPL-CCNC: 33 U/L — SIGNIFICANT CHANGE UP (ref 0–41)
BASOPHILS # BLD AUTO: 0.03 K/UL — SIGNIFICANT CHANGE UP (ref 0–0.2)
BASOPHILS # BLD AUTO: 0.04 K/UL — SIGNIFICANT CHANGE UP (ref 0–0.2)
BASOPHILS NFR BLD AUTO: 0.3 % — SIGNIFICANT CHANGE UP (ref 0–1)
BASOPHILS NFR BLD AUTO: 0.4 % — SIGNIFICANT CHANGE UP (ref 0–1)
BILIRUB SERPL-MCNC: 0.8 MG/DL — SIGNIFICANT CHANGE UP (ref 0.2–1.2)
BILIRUB SERPL-MCNC: 1.1 MG/DL — SIGNIFICANT CHANGE UP (ref 0.2–1.2)
BILIRUB SERPL-MCNC: 1.5 MG/DL — HIGH (ref 0.2–1.2)
BUN SERPL-MCNC: 25 MG/DL — HIGH (ref 10–20)
BUN SERPL-MCNC: 27 MG/DL — HIGH (ref 10–20)
BUN SERPL-MCNC: 30 MG/DL — HIGH (ref 10–20)
CALCIUM SERPL-MCNC: 7.4 MG/DL — LOW (ref 8.5–10.1)
CALCIUM SERPL-MCNC: 7.5 MG/DL — LOW (ref 8.5–10.1)
CALCIUM SERPL-MCNC: 7.6 MG/DL — LOW (ref 8.5–10.1)
CHLORIDE SERPL-SCNC: 96 MMOL/L — LOW (ref 98–110)
CHLORIDE SERPL-SCNC: 97 MMOL/L — LOW (ref 98–110)
CHLORIDE SERPL-SCNC: 99 MMOL/L — SIGNIFICANT CHANGE UP (ref 98–110)
CO2 SERPL-SCNC: 20 MMOL/L — SIGNIFICANT CHANGE UP (ref 17–32)
CO2 SERPL-SCNC: 23 MMOL/L — SIGNIFICANT CHANGE UP (ref 17–32)
CO2 SERPL-SCNC: 24 MMOL/L — SIGNIFICANT CHANGE UP (ref 17–32)
CREAT SERPL-MCNC: 0.7 MG/DL — SIGNIFICANT CHANGE UP (ref 0.7–1.5)
CREAT SERPL-MCNC: 0.7 MG/DL — SIGNIFICANT CHANGE UP (ref 0.7–1.5)
CREAT SERPL-MCNC: 0.8 MG/DL — SIGNIFICANT CHANGE UP (ref 0.7–1.5)
EGFR: 71 ML/MIN/1.73M2 — SIGNIFICANT CHANGE UP
EGFR: 84 ML/MIN/1.73M2 — SIGNIFICANT CHANGE UP
EGFR: 84 ML/MIN/1.73M2 — SIGNIFICANT CHANGE UP
EOSINOPHIL # BLD AUTO: 0.11 K/UL — SIGNIFICANT CHANGE UP (ref 0–0.7)
EOSINOPHIL # BLD AUTO: 0.13 K/UL — SIGNIFICANT CHANGE UP (ref 0–0.7)
EOSINOPHIL NFR BLD AUTO: 1.2 % — SIGNIFICANT CHANGE UP (ref 0–8)
EOSINOPHIL NFR BLD AUTO: 1.5 % — SIGNIFICANT CHANGE UP (ref 0–8)
GLUCOSE SERPL-MCNC: 114 MG/DL — HIGH (ref 70–99)
GLUCOSE SERPL-MCNC: 132 MG/DL — HIGH (ref 70–99)
GLUCOSE SERPL-MCNC: 156 MG/DL — HIGH (ref 70–99)
HCT VFR BLD CALC: 20.8 % — LOW (ref 37–47)
HCT VFR BLD CALC: 27.3 % — LOW (ref 37–47)
HGB BLD-MCNC: 6.7 G/DL — CRITICAL LOW (ref 12–16)
HGB BLD-MCNC: 9.1 G/DL — LOW (ref 12–16)
IMM GRANULOCYTES NFR BLD AUTO: 1.7 % — HIGH (ref 0.1–0.3)
IMM GRANULOCYTES NFR BLD AUTO: 2.8 % — HIGH (ref 0.1–0.3)
LYMPHOCYTES # BLD AUTO: 1.18 K/UL — LOW (ref 1.2–3.4)
LYMPHOCYTES # BLD AUTO: 1.19 K/UL — LOW (ref 1.2–3.4)
LYMPHOCYTES # BLD AUTO: 12.5 % — LOW (ref 20.5–51.1)
LYMPHOCYTES # BLD AUTO: 13.5 % — LOW (ref 20.5–51.1)
MAGNESIUM SERPL-MCNC: 2 MG/DL — SIGNIFICANT CHANGE UP (ref 1.8–2.4)
MCHC RBC-ENTMCNC: 29.1 PG — SIGNIFICANT CHANGE UP (ref 27–31)
MCHC RBC-ENTMCNC: 29.1 PG — SIGNIFICANT CHANGE UP (ref 27–31)
MCHC RBC-ENTMCNC: 32.2 G/DL — SIGNIFICANT CHANGE UP (ref 32–37)
MCHC RBC-ENTMCNC: 33.3 G/DL — SIGNIFICANT CHANGE UP (ref 32–37)
MCV RBC AUTO: 87.2 FL — SIGNIFICANT CHANGE UP (ref 81–99)
MCV RBC AUTO: 90.4 FL — SIGNIFICANT CHANGE UP (ref 81–99)
MONOCYTES # BLD AUTO: 0.78 K/UL — HIGH (ref 0.1–0.6)
MONOCYTES # BLD AUTO: 0.79 K/UL — HIGH (ref 0.1–0.6)
MONOCYTES NFR BLD AUTO: 8.2 % — SIGNIFICANT CHANGE UP (ref 1.7–9.3)
MONOCYTES NFR BLD AUTO: 9 % — SIGNIFICANT CHANGE UP (ref 1.7–9.3)
NEUTROPHILS # BLD AUTO: 6.45 K/UL — SIGNIFICANT CHANGE UP (ref 1.4–6.5)
NEUTROPHILS # BLD AUTO: 7.14 K/UL — HIGH (ref 1.4–6.5)
NEUTROPHILS NFR BLD AUTO: 74 % — SIGNIFICANT CHANGE UP (ref 42.2–75.2)
NEUTROPHILS NFR BLD AUTO: 74.9 % — SIGNIFICANT CHANGE UP (ref 42.2–75.2)
NRBC # BLD: 0 /100 WBCS — SIGNIFICANT CHANGE UP (ref 0–0)
NRBC # BLD: 0 /100 WBCS — SIGNIFICANT CHANGE UP (ref 0–0)
PLATELET # BLD AUTO: 195 K/UL — SIGNIFICANT CHANGE UP (ref 130–400)
PLATELET # BLD AUTO: 206 K/UL — SIGNIFICANT CHANGE UP (ref 130–400)
POTASSIUM SERPL-MCNC: 4.3 MMOL/L — SIGNIFICANT CHANGE UP (ref 3.5–5)
POTASSIUM SERPL-MCNC: 4.4 MMOL/L — SIGNIFICANT CHANGE UP (ref 3.5–5)
POTASSIUM SERPL-MCNC: 4.6 MMOL/L — SIGNIFICANT CHANGE UP (ref 3.5–5)
POTASSIUM SERPL-SCNC: 4.3 MMOL/L — SIGNIFICANT CHANGE UP (ref 3.5–5)
POTASSIUM SERPL-SCNC: 4.4 MMOL/L — SIGNIFICANT CHANGE UP (ref 3.5–5)
POTASSIUM SERPL-SCNC: 4.6 MMOL/L — SIGNIFICANT CHANGE UP (ref 3.5–5)
PROT SERPL-MCNC: 4.3 G/DL — LOW (ref 6–8)
PROT SERPL-MCNC: 4.4 G/DL — LOW (ref 6–8)
PROT SERPL-MCNC: 4.6 G/DL — LOW (ref 6–8)
RBC # BLD: 2.3 M/UL — LOW (ref 4.2–5.4)
RBC # BLD: 3.13 M/UL — LOW (ref 4.2–5.4)
RBC # FLD: 20.7 % — HIGH (ref 11.5–14.5)
RBC # FLD: 22 % — HIGH (ref 11.5–14.5)
SODIUM SERPL-SCNC: 128 MMOL/L — LOW (ref 135–146)
SODIUM SERPL-SCNC: 129 MMOL/L — LOW (ref 135–146)
SODIUM SERPL-SCNC: 131 MMOL/L — LOW (ref 135–146)
WBC # BLD: 8.73 K/UL — SIGNIFICANT CHANGE UP (ref 4.8–10.8)
WBC # BLD: 9.53 K/UL — SIGNIFICANT CHANGE UP (ref 4.8–10.8)
WBC # FLD AUTO: 8.73 K/UL — SIGNIFICANT CHANGE UP (ref 4.8–10.8)
WBC # FLD AUTO: 9.53 K/UL — SIGNIFICANT CHANGE UP (ref 4.8–10.8)

## 2022-03-25 PROCEDURE — 99233 SBSQ HOSP IP/OBS HIGH 50: CPT

## 2022-03-25 PROCEDURE — 93010 ELECTROCARDIOGRAM REPORT: CPT

## 2022-03-25 PROCEDURE — 99223 1ST HOSP IP/OBS HIGH 75: CPT

## 2022-03-25 PROCEDURE — 71045 X-RAY EXAM CHEST 1 VIEW: CPT | Mod: 26

## 2022-03-25 RX ORDER — FUROSEMIDE 40 MG
10 TABLET ORAL ONCE
Refills: 0 | Status: COMPLETED | OUTPATIENT
Start: 2022-03-25 | End: 2022-03-25

## 2022-03-25 RX ADMIN — SENNA PLUS 2 TABLET(S): 8.6 TABLET ORAL at 21:07

## 2022-03-25 RX ADMIN — Medication 1000 UNIT(S): at 11:54

## 2022-03-25 RX ADMIN — Medication 1 DROP(S): at 18:38

## 2022-03-25 RX ADMIN — PANTOPRAZOLE SODIUM 40 MILLIGRAM(S): 20 TABLET, DELAYED RELEASE ORAL at 06:05

## 2022-03-25 RX ADMIN — MORPHINE SULFATE 2 MILLIGRAM(S): 50 CAPSULE, EXTENDED RELEASE ORAL at 20:49

## 2022-03-25 RX ADMIN — SODIUM CHLORIDE 60 MILLILITER(S): 9 INJECTION INTRAMUSCULAR; INTRAVENOUS; SUBCUTANEOUS at 06:27

## 2022-03-25 RX ADMIN — ENOXAPARIN SODIUM 40 MILLIGRAM(S): 100 INJECTION SUBCUTANEOUS at 22:31

## 2022-03-25 RX ADMIN — Medication 10 MILLIGRAM(S): at 18:29

## 2022-03-25 RX ADMIN — SIMVASTATIN 20 MILLIGRAM(S): 20 TABLET, FILM COATED ORAL at 21:07

## 2022-03-25 NOTE — DIETITIAN INITIAL EVALUATION ADULT. - PERTINENT MEDS FT
MEDICATIONS  (STANDING):  cholecalciferol 2000 Unit(s) Oral daily  enoxaparin Injectable 40 milliGRAM(s) SubCutaneous every 24 hours  furosemide   Injectable 10 milliGRAM(s) IV Push once  HYDROmorphone PCA (1 mG/mL) 30 milliLiter(s) PCA Continuous PCA Continuous  pantoprazole    Tablet 40 milliGRAM(s) Oral before breakfast  prednisoLONE acetate 1% Suspension 1 Drop(s) Right EYE daily  senna 2 Tablet(s) Oral at bedtime  simvastatin 20 milliGRAM(s) Oral at bedtime  sodium chloride 0.9%. 1000 milliLiter(s) (60 mL/Hr) IV Continuous <Continuous>    MEDICATIONS  (PRN):  acetaminophen     Tablet .. 650 milliGRAM(s) Oral every 6 hours PRN Moderate Pain (4 - 6), Severe Pain (7 - 10)  morphine  - Injectable 2 milliGRAM(s) IV Push every 4 hours PRN Moderate Pain (4 - 6)  naloxone Injectable 0.1 milliGRAM(s) IV Push every 3 minutes PRN For ANY of the following changes in patient status:  A. RR LESS THAN 10 breaths per minute, B. Oxygen saturation LESS THAN 90%, C. Sedation score of 6  ondansetron Injectable 4 milliGRAM(s) IV Push every 6 hours PRN Nausea

## 2022-03-25 NOTE — CONSULT NOTE ADULT - SUBJECTIVE AND OBJECTIVE BOX
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 Pt lives with her daughter and went to her old home to collect some documents and tripped in the doorway and landed on her right knee. Had immediate pain, unable to ambulate afterwards. Pt normally ambulates w/ a cane. Pt underwent left TKA by Dr. Morton in 2007 and a right TKA by Dr. Perkins at [facility] approx 8 years ago. right PATRICK Type II periprosthetic distal femur fracture. Pt placed in a knee immobilizer.     ED COURSE: As above, HD stable (16 Mar 2022 20:55)      Prior EGD:    Prior Colonoscopy:      PAST MEDICAL & SURGICAL HISTORY:  High cholesterol    Kidney stone    Bladder polyp    Shingles  affecting right eye    Atrial flutter    H/O cystoscopy    H/O total knee replacement, right    H/O total knee replacement, left    Cancer of mouth    History of cardioversion          FAMILY HISTORY:      Social History:  Tobacco:  Alcohol:  Drugs:    Home Medications:  Lasix 20 mg oral tablet: 1 tab(s) orally 2 times a week then the following week take it 3 times per week (16 Mar 2022 21:45)  metoprolol succinate 25 mg oral tablet, extended release: 0.5 tab(s) orally once a day (16 Mar 2022 21:45)  prednisoLONE acetate 1% ophthalmic suspension: 1 drop(s) in the right eye once a day (16 Mar 2022 21:45)  Vitamin D2 50 mcg (2000 intl units) oral capsule: 1 cap(s) orally once a day (16 Mar 2022 21:45)      MEDICATIONS  (STANDING):  cholecalciferol 2000 Unit(s) Oral daily  enoxaparin Injectable 40 milliGRAM(s) SubCutaneous every 24 hours  HYDROmorphone PCA (1 mG/mL) 30 milliLiter(s) PCA Continuous PCA Continuous  pantoprazole    Tablet 40 milliGRAM(s) Oral before breakfast  prednisoLONE acetate 1% Suspension 1 Drop(s) Right EYE daily  senna 2 Tablet(s) Oral at bedtime  simvastatin 20 milliGRAM(s) Oral at bedtime  sodium chloride 0.9%. 1000 milliLiter(s) (60 mL/Hr) IV Continuous <Continuous>    MEDICATIONS  (PRN):  acetaminophen     Tablet .. 650 milliGRAM(s) Oral every 6 hours PRN Moderate Pain (4 - 6), Severe Pain (7 - 10)  morphine  - Injectable 2 milliGRAM(s) IV Push every 4 hours PRN Moderate Pain (4 - 6)  naloxone Injectable 0.1 milliGRAM(s) IV Push every 3 minutes PRN For ANY of the following changes in patient status:  A. RR LESS THAN 10 breaths per minute, B. Oxygen saturation LESS THAN 90%, C. Sedation score of 6  ondansetron Injectable 4 milliGRAM(s) IV Push every 6 hours PRN Nausea      Allergies  No Known Allergies      Review of Systems:   Constitutional:  No Fever, No Chills  ENT/Mouth:  No Hearing Changes,  No Difficulty Swallowing  Eyes:  No Eye Pain, No Vision Changes  Cardiovascular:  No Chest Pain, No Palpitations  Respiratory:  No Cough, No Dyspnea  Gastrointestinal:  As described in HPI  Musculoskeletal:  No Joint Swelling, No Back Pain  Skin:  No Skin Lesions, No Jaundice  Neuro:  No Syncope, No Dizziness  Heme/Lymph:  No Bruising, No Bleeding.      Physical Examination:  T(C): 37.2 (03-25-22 @ 05:23), Max: 37.2 (03-25-22 @ 05:23)  HR: 83 (03-25-22 @ 05:23) (78 - 85)  BP: 116/56 (03-25-22 @ 05:23) (96/44 - 118/54)  RR: 18 (03-25-22 @ 05:23) (18 - 18)  SpO2: --      03-23-22 @ 07:01  -  03-24-22 @ 07:00  --------------------------------------------------------  IN: 0 mL / OUT: 1520 mL / NET: -1520 mL    03-24-22 @ 07:01  -  03-25-22 @ 07:00  --------------------------------------------------------  IN: 360 mL / OUT: 2600 mL / NET: -2240 mL      GENERAL: AAOx3, no acute distress.  HEAD:  Atraumatic, Normocephalic  EYES: conjunctiva and sclera clear  NECK: Supple, no JVD or thyromegaly  CHEST/LUNG: Clear to auscultation bilaterally; No wheeze, rhonchi, or rales  HEART: Regular rate and rhythm; normal S1, S2, No murmurs.  ABDOMEN: Soft, nontender, nondistended; Bowel sounds present  NEUROLOGY: No asterixis or tremor.   SKIN: Intact, no jaundice    Data:                        6.7    8.73  )-----------( 195      ( 25 Mar 2022 06:40 )             20.8     Hgb Trend:  6.7  03-25-22 @ 06:40  7.5  03-24-22 @ 06:15  7.3  03-23-22 @ 10:29  5.4  03-23-22 @ 08:00      03-25    131<L>  |  99  |  30<H>  ----------------------------<  114<H>  4.6   |  23  |  0.7    Ca    7.5<L>      25 Mar 2022 06:40  Mg     2.0     03-25    TPro  4.3<L>  /  Alb  2.1<L>  /  TBili  0.8  /  DBili  x   /  AST  30  /  ALT  11  /  AlkPhos  102  03-25    Liver panel trend:  TBili 0.8   /   AST 30   /   ALT 11   /   AlkP 102   /   Tptn 4.3   /   Alb 2.1    /   DBili --      03-25  TBili 0.8   /   AST 47   /   ALT 14   /   AlkP 115   /   Tptn 4.8   /   Alb 2.5    /   DBili --      03-24  TBili 0.6   /   AST 43   /   ALT 11   /   AlkP 95   /   Tptn 4.8   /   Alb 2.6    /   DBili --      03-23  TBili 0.7   /   AST 24   /   ALT 11   /   AlkP 96   /   Tptn 5.2   /   Alb 2.7    /   DBili --      03-22  TBili 1.0   /   AST 17   /   ALT 11   /   AlkP 95   /   Tptn 4.8   /   Alb 2.8    /   DBili --      03-21  TBili 1.2   /   AST 15   /   ALT 9   /   AlkP 85   /   Tptn 5.1   /   Alb 2.8    /   DBili --      03-20  TBili 1.0   /   AST 13   /   ALT 7   /   AlkP 79   /   Tptn 5.1   /   Alb 2.8    /   DBili --      03-19  TBili 0.6   /   AST 11   /   ALT 6   /   AlkP 78   /   Tptn 4.8   /   Alb 3.0    /   DBili --      03-18  TBili 0.5   /   AST 12   /   ALT 7   /   AlkP 81   /   Tptn 4.9   /   Alb 3.1    /   DBili --      03-17  TBili 0.4   /   AST 15   /   ALT 9   /   AlkP 95   /   Tptn 6.2   /   Alb 3.7    /   DBili --      03-16              Radiology:       Gastroenterology Consultation:    Patient is a 87y old Female who presents with a chief complaint of fall (22 Mar 2022 10:16)    Admitted on: 03-16-22    Hospital Day: 9    HPI:   86 F PMH HFpEF, Afib (off Eliquis since 01/2022), kidney stones, 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 Patient is being evaluated today due to her acute Hgb drop 7.5 -> 6.7. Patient has received a total of 3u pRBC transfusions during this hospital course, Hgb drop presumed from her recent injury. Getting 1 unit of pRBC today (total 4u pRBC in this admission)    Her history of anemia dates back to January 2022, where patient was admitted on 01/13/22 for Hgb 6.5. Last known Hgb prior to this admission was in July 2021 ~12. No evidence of acute bleeding at the time, s/p 3u RBC at that hospital course. Pt was stable on discharge, with precautions to follow up w/ GI outpatient, on Eliquis.     Per patient, she received iron infusions outpatient and has been followed by Dr. Payne. Patient off Eliquis since January 2022 due to GI bleed concern. Patient had a capsule endoscopy scheduled w/ GI Dr. Paulino outpatient, but canceled due to her current hospitalization.     Patient was seen and examined at bedside today. She is feeling well. Denies any recent bowel habit changes, blood or dark stools, lightheadedness, N/V/D, CP, SOB.     Prior EGD: never    Prior Colonoscopy: 7 years ago, normal results per patient      PAST MEDICAL & SURGICAL HISTORY:  High cholesterol    Kidney stone    Bladder polyp    Shingles  affecting right eye    Atrial flutter    H/O cystoscopy    H/O total knee replacement, right    H/O total knee replacement, left    Cancer of mouth    History of cardioversion    FAMILY HISTORY:  No family Hx of GI cancers.     Social History:  Tobacco: never  Alcohol: never  Drugs: never    Home Medications:  Lasix 20 mg oral tablet: 1 tab(s) orally 2 times a week then the following week take it 3 times per week (16 Mar 2022 21:45)  metoprolol succinate 25 mg oral tablet, extended release: 0.5 tab(s) orally once a day (16 Mar 2022 21:45)  prednisoLONE acetate 1% ophthalmic suspension: 1 drop(s) in the right eye once a day (16 Mar 2022 21:45)  Vitamin D2 50 mcg (2000 intl units) oral capsule: 1 cap(s) orally once a day (16 Mar 2022 21:45)      MEDICATIONS  (STANDING):  cholecalciferol 2000 Unit(s) Oral daily  enoxaparin Injectable 40 milliGRAM(s) SubCutaneous every 24 hours  HYDROmorphone PCA (1 mG/mL) 30 milliLiter(s) PCA Continuous PCA Continuous  pantoprazole    Tablet 40 milliGRAM(s) Oral before breakfast  prednisoLONE acetate 1% Suspension 1 Drop(s) Right EYE daily  senna 2 Tablet(s) Oral at bedtime  simvastatin 20 milliGRAM(s) Oral at bedtime  sodium chloride 0.9%. 1000 milliLiter(s) (60 mL/Hr) IV Continuous <Continuous>    MEDICATIONS  (PRN):  acetaminophen     Tablet .. 650 milliGRAM(s) Oral every 6 hours PRN Moderate Pain (4 - 6), Severe Pain (7 - 10)  morphine  - Injectable 2 milliGRAM(s) IV Push every 4 hours PRN Moderate Pain (4 - 6)  naloxone Injectable 0.1 milliGRAM(s) IV Push every 3 minutes PRN For ANY of the following changes in patient status:  A. RR LESS THAN 10 breaths per minute, B. Oxygen saturation LESS THAN 90%, C. Sedation score of 6  ondansetron Injectable 4 milliGRAM(s) IV Push every 6 hours PRN Nausea      Allergies  No Known Allergies    Review of Systems:   Constitutional:  No Fever, No Chills  ENT/Mouth:  No Hearing Changes,  No Difficulty Swallowing  Eyes:  No Eye Pain, No Vision Changes  Cardiovascular:  No Chest Pain, No Palpitations  Respiratory:  No Cough, No Dyspnea  Gastrointestinal:  As described in HPI  Musculoskeletal:  No Joint Swelling, No Back Pain  Skin:  No Skin Lesions, No Jaundice  Neuro:  No Syncope, No Dizziness  Heme/Lymph:  No Bruising, No Bleeding.      Physical Examination:  T(C): 37.2 (03-25-22 @ 05:23), Max: 37.2 (03-25-22 @ 05:23)  HR: 83 (03-25-22 @ 05:23) (78 - 85)  BP: 116/56 (03-25-22 @ 05:23) (96/44 - 118/54)  RR: 18 (03-25-22 @ 05:23) (18 - 18)  SpO2: --      03-23-22 @ 07:01  -  03-24-22 @ 07:00  --------------------------------------------------------  IN: 0 mL / OUT: 1520 mL / NET: -1520 mL    03-24-22 @ 07:01  -  03-25-22 @ 07:00  --------------------------------------------------------  IN: 360 mL / OUT: 2600 mL / NET: -2240 mL      GENERAL: AAOx3, no acute distress.  HEAD:  Atraumatic, Normocephalic  EYES: conjunctiva and sclera clear  NECK: Supple, no JVD or thyromegaly  CHEST/LUNG: Clear to auscultation bilaterally; No wheeze, rhonchi, or rales  HEART: Regular rate and rhythm; normal S1, S2, No murmurs.  ABDOMEN: Soft, nontender, nondistended; Bowel sounds present  NEUROLOGY: No asterixis or tremor.   SKIN: Intact, no jaundice    Data:                        6.7    8.73  )-----------( 195      ( 25 Mar 2022 06:40 )             20.8     Hgb Trend:  6.7  03-25-22 @ 06:40  7.5  03-24-22 @ 06:15  7.3  03-23-22 @ 10:29  5.4  03-23-22 @ 08:00      03-25    131<L>  |  99  |  30<H>  ----------------------------<  114<H>  4.6   |  23  |  0.7    Ca    7.5<L>      25 Mar 2022 06:40  Mg     2.0     03-25    TPro  4.3<L>  /  Alb  2.1<L>  /  TBili  0.8  /  DBili  x   /  AST  30  /  ALT  11  /  AlkPhos  102  03-25    Liver panel trend:  TBili 0.8   /   AST 30   /   ALT 11   /   AlkP 102   /   Tptn 4.3   /   Alb 2.1    /   DBili --      03-25  TBili 0.8   /   AST 47   /   ALT 14   /   AlkP 115   /   Tptn 4.8   /   Alb 2.5    /   DBili --      03-24  TBili 0.6   /   AST 43   /   ALT 11   /   AlkP 95   /   Tptn 4.8   /   Alb 2.6    /   DBili --      03-23  TBili 0.7   /   AST 24   /   ALT 11   /   AlkP 96   /   Tptn 5.2   /   Alb 2.7    /   DBili --      03-22  TBili 1.0   /   AST 17   /   ALT 11   /   AlkP 95   /   Tptn 4.8   /   Alb 2.8    /   DBili --      03-21  TBili 1.2   /   AST 15   /   ALT 9   /   AlkP 85   /   Tptn 5.1   /   Alb 2.8    /   DBili --      03-20  TBili 1.0   /   AST 13   /   ALT 7   /   AlkP 79   /   Tptn 5.1   /   Alb 2.8    /   DBili --      03-19  TBili 0.6   /   AST 11   /   ALT 6   /   AlkP 78   /   Tptn 4.8   /   Alb 3.0    /   DBili --      03-18  TBili 0.5   /   AST 12   /   ALT 7   /   AlkP 81   /   Tptn 4.9   /   Alb 3.1    /   DBili --      03-17  TBili 0.4   /   AST 15   /   ALT 9   /   AlkP 95   /   Tptn 6.2   /   Alb 3.7    /   DBili --      03-16              Radiology:       Gastroenterology Consultation:    Patient is a 87y old Female who presents with a chief complaint of fall (22 Mar 2022 10:16)    Admitted on: 03-16-22    Hospital Day: 9    HPI:   86 F PMH HFpEF, Afib (off Eliquis since 01/2022), kidney stones, 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 Patient is being evaluated today due to her acute Hgb drop 7.5 -> 6.7. Patient has received a total of 3u pRBC transfusions during this hospital course, Hgb drop presumed from her recent injury. Getting 1 unit of pRBC today (total 4u pRBC in this admission).    Her history of anemia dates back to January 2022, where patient was admitted on 01/13/22 for Hgb 6.5. Last known Hgb prior to this admission was in July 2021 ~12. No evidence of acute bleeding at the time, s/p 3u RBC at that hospital course. Pt was stable on discharge, with precautions to follow up w/ GI outpatient, on Eliquis.     Per patient, she received iron infusions outpatient and has been followed by Dr. Payne. Patient off Eliquis since January 2022 due to GI bleed concern. Patient had a capsule endoscopy scheduled w/ GI Dr. Paulino outpatient, but canceled due to her current hospitalization.     Patient was seen and examined at bedside today. She is feeling well. Denies any recent bowel habit changes, blood or dark stools, lightheadedness, N/V/D, CP, SOB. Patient recalls having some dark stools in 2021, but not recently.     Prior EGD: never    Prior Colonoscopy: 7 years ago, normal results per patient      PAST MEDICAL & SURGICAL HISTORY:  High cholesterol    Kidney stone    Bladder polyp    Shingles  affecting right eye    Atrial flutter    H/O cystoscopy    H/O total knee replacement, right    H/O total knee replacement, left    Cancer of mouth    History of cardioversion    FAMILY HISTORY:  No family Hx of GI cancers.     Social History:  Tobacco: never  Alcohol: never  Drugs: never    Home Medications:  Lasix 20 mg oral tablet: 1 tab(s) orally 2 times a week then the following week take it 3 times per week (16 Mar 2022 21:45)  metoprolol succinate 25 mg oral tablet, extended release: 0.5 tab(s) orally once a day (16 Mar 2022 21:45)  prednisoLONE acetate 1% ophthalmic suspension: 1 drop(s) in the right eye once a day (16 Mar 2022 21:45)  Vitamin D2 50 mcg (2000 intl units) oral capsule: 1 cap(s) orally once a day (16 Mar 2022 21:45)      MEDICATIONS  (STANDING):  cholecalciferol 2000 Unit(s) Oral daily  enoxaparin Injectable 40 milliGRAM(s) SubCutaneous every 24 hours  HYDROmorphone PCA (1 mG/mL) 30 milliLiter(s) PCA Continuous PCA Continuous  pantoprazole    Tablet 40 milliGRAM(s) Oral before breakfast  prednisoLONE acetate 1% Suspension 1 Drop(s) Right EYE daily  senna 2 Tablet(s) Oral at bedtime  simvastatin 20 milliGRAM(s) Oral at bedtime  sodium chloride 0.9%. 1000 milliLiter(s) (60 mL/Hr) IV Continuous <Continuous>    MEDICATIONS  (PRN):  acetaminophen     Tablet .. 650 milliGRAM(s) Oral every 6 hours PRN Moderate Pain (4 - 6), Severe Pain (7 - 10)  morphine  - Injectable 2 milliGRAM(s) IV Push every 4 hours PRN Moderate Pain (4 - 6)  naloxone Injectable 0.1 milliGRAM(s) IV Push every 3 minutes PRN For ANY of the following changes in patient status:  A. RR LESS THAN 10 breaths per minute, B. Oxygen saturation LESS THAN 90%, C. Sedation score of 6  ondansetron Injectable 4 milliGRAM(s) IV Push every 6 hours PRN Nausea      Allergies  No Known Allergies    Review of Systems:   Constitutional:  No Fever, No Chills  ENT/Mouth:  Mild hearing impairment  No Difficulty Swallowing  Eyes:  No Eye Pain, No Vision Changes  Cardiovascular:  No Chest Pain, No Palpitations  Respiratory:  No Cough, No Dyspnea  Gastrointestinal:  As described in HPI  Musculoskeletal:  No Joint Swelling, No Back Pain  Skin:  No Skin Lesions, No Jaundice  Neuro:  No Syncope, No Dizziness  Heme/Lymph:  No Bruising, No Bleeding.      Physical Examination:  T(C): 37.2 (03-25-22 @ 05:23), Max: 37.2 (03-25-22 @ 05:23)  HR: 83 (03-25-22 @ 05:23) (78 - 85)  BP: 116/56 (03-25-22 @ 05:23) (96/44 - 118/54)  RR: 18 (03-25-22 @ 05:23) (18 - 18)  SpO2: --      03-23-22 @ 07:01  -  03-24-22 @ 07:00  --------------------------------------------------------  IN: 0 mL / OUT: 1520 mL / NET: -1520 mL    03-24-22 @ 07:01  -  03-25-22 @ 07:00  --------------------------------------------------------  IN: 360 mL / OUT: 2600 mL / NET: -2240 mL      GENERAL: AAOx3, no acute distress.  HEAD:  Atraumatic, Normocephalic  EYES: conjunctiva and sclera clear  NECK: Supple, no JVD or thyromegaly  CHEST/LUNG: Mild crackles b/l ; No wheeze, rhonchi, or rales  HEART: Regular rate and rhythm; normal S1, S2, No murmurs.  ABDOMEN: Soft, nontender, nondistended; Bowel sounds present  NEUROLOGY: No asterixis or tremor.   SKIN: b/l knee scars, no jaundice  Musculoskeletal: b/l LE edema    Data:                        6.7    8.73  )-----------( 195      ( 25 Mar 2022 06:40 )             20.8     Hgb Trend:  6.7  03-25-22 @ 06:40  7.5  03-24-22 @ 06:15  7.3  03-23-22 @ 10:29  5.4  03-23-22 @ 08:00      03-25    131<L>  |  99  |  30<H>  ----------------------------<  114<H>  4.6   |  23  |  0.7    Ca    7.5<L>      25 Mar 2022 06:40  Mg     2.0     03-25    TPro  4.3<L>  /  Alb  2.1<L>  /  TBili  0.8  /  DBili  x   /  AST  30  /  ALT  11  /  AlkPhos  102  03-25    Liver panel trend:  TBili 0.8   /   AST 30   /   ALT 11   /   AlkP 102   /   Tptn 4.3   /   Alb 2.1    /   DBili --      03-25  TBili 0.8   /   AST 47   /   ALT 14   /   AlkP 115   /   Tptn 4.8   /   Alb 2.5    /   DBili --      03-24  TBili 0.6   /   AST 43   /   ALT 11   /   AlkP 95   /   Tptn 4.8   /   Alb 2.6    /   DBili --      03-23  TBili 0.7   /   AST 24   /   ALT 11   /   AlkP 96   /   Tptn 5.2   /   Alb 2.7    /   DBili --      03-22  TBili 1.0   /   AST 17   /   ALT 11   /   AlkP 95   /   Tptn 4.8   /   Alb 2.8    /   DBili --      03-21  TBili 1.2   /   AST 15   /   ALT 9   /   AlkP 85   /   Tptn 5.1   /   Alb 2.8    /   DBili --      03-20  TBili 1.0   /   AST 13   /   ALT 7   /   AlkP 79   /   Tptn 5.1   /   Alb 2.8    /   DBili --      03-19  TBili 0.6   /   AST 11   /   ALT 6   /   AlkP 78   /   Tptn 4.8   /   Alb 3.0    /   DBili --      03-18  TBili 0.5   /   AST 12   /   ALT 7   /   AlkP 81   /   Tptn 4.9   /   Alb 3.1    /   DBili --      03-17  TBili 0.4   /   AST 15   /   ALT 9   /   AlkP 95   /   Tptn 6.2   /   Alb 3.7    /   DBili --      03-16              Radiology:       Gastroenterology Consultation:    Patient is a 87y old Female who is being evaluated for anemia.     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 Patient is being evaluated today due to her acute Hgb drop 7.5 -> 6.7. Patient has received a total of 3u pRBC transfusions during this hospital course, Hgb drop presumed from her recent injury. Getting 1 unit of pRBC today (total 4u pRBC in this admission).    Her history of anemia dates back to January 2022, where patient was admitted on 01/13/22 for Hgb 6.5. Last known Hgb prior to this admission was in July 2021 ~12. No evidence of acute bleeding at the time, s/p 3u RBC at that hospital course. Pt was stable on discharge, with precautions to follow up w/ GI outpatient, on Eliquis.     Per patient, she received iron infusions outpatient and has been followed by Dr. Payne. Patient off Eliquis since January 2022 due to GI bleed concern. Patient had a capsule endoscopy scheduled w/ GI Dr. Paulino outpatient, but canceled due to her current hospitalization.     Patient was seen and examined at bedside today. She is feeling well. Denies any recent bowel habit changes, blood or dark stools, lightheadedness, N/V/D, CP, SOB. Patient recalls having some dark stools in 2021, but not recently.     Prior EGD: never    Prior Colonoscopy: 7 years ago, normal results per patient      PAST MEDICAL & SURGICAL HISTORY:  High cholesterol    Kidney stone    Bladder polyp    Shingles  affecting right eye    Atrial flutter    H/O cystoscopy    H/O total knee replacement, right    H/O total knee replacement, left    Cancer of mouth    History of cardioversion    FAMILY HISTORY:  No family Hx of GI cancers.     Social History:  Tobacco: never  Alcohol: never  Drugs: never    Home Medications:  Lasix 20 mg oral tablet: 1 tab(s) orally 2 times a week then the following week take it 3 times per week (16 Mar 2022 21:45)  metoprolol succinate 25 mg oral tablet, extended release: 0.5 tab(s) orally once a day (16 Mar 2022 21:45)  prednisoLONE acetate 1% ophthalmic suspension: 1 drop(s) in the right eye once a day (16 Mar 2022 21:45)  Vitamin D2 50 mcg (2000 intl units) oral capsule: 1 cap(s) orally once a day (16 Mar 2022 21:45)      MEDICATIONS  (STANDING):  cholecalciferol 2000 Unit(s) Oral daily  enoxaparin Injectable 40 milliGRAM(s) SubCutaneous every 24 hours  HYDROmorphone PCA (1 mG/mL) 30 milliLiter(s) PCA Continuous PCA Continuous  pantoprazole    Tablet 40 milliGRAM(s) Oral before breakfast  prednisoLONE acetate 1% Suspension 1 Drop(s) Right EYE daily  senna 2 Tablet(s) Oral at bedtime  simvastatin 20 milliGRAM(s) Oral at bedtime  sodium chloride 0.9%. 1000 milliLiter(s) (60 mL/Hr) IV Continuous <Continuous>    MEDICATIONS  (PRN):  acetaminophen     Tablet .. 650 milliGRAM(s) Oral every 6 hours PRN Moderate Pain (4 - 6), Severe Pain (7 - 10)  morphine  - Injectable 2 milliGRAM(s) IV Push every 4 hours PRN Moderate Pain (4 - 6)  naloxone Injectable 0.1 milliGRAM(s) IV Push every 3 minutes PRN For ANY of the following changes in patient status:  A. RR LESS THAN 10 breaths per minute, B. Oxygen saturation LESS THAN 90%, C. Sedation score of 6  ondansetron Injectable 4 milliGRAM(s) IV Push every 6 hours PRN Nausea      Allergies  No Known Allergies    Review of Systems:   Constitutional:  No Fever, No Chills  ENT/Mouth:  Mild hearing impairment  No Difficulty Swallowing  Eyes:  No Eye Pain, No Vision Changes  Cardiovascular:  No Chest Pain, No Palpitations  Respiratory:  No Cough, No Dyspnea  Gastrointestinal:  As described in HPI  Musculoskeletal:  No Joint Swelling, No Back Pain  Skin:  No Skin Lesions, No Jaundice  Neuro:  No Syncope, No Dizziness  Heme/Lymph:  No Bruising, No Bleeding.      Physical Examination:  T(C): 37.2 (03-25-22 @ 05:23), Max: 37.2 (03-25-22 @ 05:23)  HR: 83 (03-25-22 @ 05:23) (78 - 85)  BP: 116/56 (03-25-22 @ 05:23) (96/44 - 118/54)  RR: 18 (03-25-22 @ 05:23) (18 - 18)  SpO2: --      03-23-22 @ 07:01  -  03-24-22 @ 07:00  --------------------------------------------------------  IN: 0 mL / OUT: 1520 mL / NET: -1520 mL    03-24-22 @ 07:01  -  03-25-22 @ 07:00  --------------------------------------------------------  IN: 360 mL / OUT: 2600 mL / NET: -2240 mL      GENERAL: AAOx3, no acute distress.  HEAD:  Atraumatic, Normocephalic  EYES: conjunctiva and sclera clear  NECK: Supple, no JVD or thyromegaly  CHEST/LUNG: Mild crackles b/l ; No wheeze, rhonchi, or rales  HEART: Regular rate and rhythm; normal S1, S2, No murmurs.  ABDOMEN: Soft, nontender, nondistended; Bowel sounds present  NEUROLOGY: No asterixis or tremor.   SKIN: b/l knee scars, no jaundice  Musculoskeletal: b/l LE edema    Data:                        6.7    8.73  )-----------( 195      ( 25 Mar 2022 06:40 )             20.8     Hgb Trend:  6.7  03-25-22 @ 06:40  7.5  03-24-22 @ 06:15  7.3  03-23-22 @ 10:29  5.4  03-23-22 @ 08:00      03-25    131<L>  |  99  |  30<H>  ----------------------------<  114<H>  4.6   |  23  |  0.7    Ca    7.5<L>      25 Mar 2022 06:40  Mg     2.0     03-25    TPro  4.3<L>  /  Alb  2.1<L>  /  TBili  0.8  /  DBili  x   /  AST  30  /  ALT  11  /  AlkPhos  102  03-25    Liver panel trend:  TBili 0.8   /   AST 30   /   ALT 11   /   AlkP 102   /   Tptn 4.3   /   Alb 2.1    /   DBili --      03-25  TBili 0.8   /   AST 47   /   ALT 14   /   AlkP 115   /   Tptn 4.8   /   Alb 2.5    /   DBili --      03-24  TBili 0.6   /   AST 43   /   ALT 11   /   AlkP 95   /   Tptn 4.8   /   Alb 2.6    /   DBili --      03-23  TBili 0.7   /   AST 24   /   ALT 11   /   AlkP 96   /   Tptn 5.2   /   Alb 2.7    /   DBili --      03-22  TBili 1.0   /   AST 17   /   ALT 11   /   AlkP 95   /   Tptn 4.8   /   Alb 2.8    /   DBili --      03-21  TBili 1.2   /   AST 15   /   ALT 9   /   AlkP 85   /   Tptn 5.1   /   Alb 2.8    /   DBili --      03-20  TBili 1.0   /   AST 13   /   ALT 7   /   AlkP 79   /   Tptn 5.1   /   Alb 2.8    /   DBili --      03-19  TBili 0.6   /   AST 11   /   ALT 6   /   AlkP 78   /   Tptn 4.8   /   Alb 3.0    /   DBili --      03-18  TBili 0.5   /   AST 12   /   ALT 7   /   AlkP 81   /   Tptn 4.9   /   Alb 3.1    /   DBili --      03-17  TBili 0.4   /   AST 15   /   ALT 9   /   AlkP 95   /   Tptn 6.2   /   Alb 3.7    /   DBili --      03-16              Radiology:

## 2022-03-25 NOTE — DIETITIAN INITIAL EVALUATION ADULT. - OTHER CALCULATIONS
Using ABW 75 KG: ENERGY: 1861-0903 kcal/day (MSJ 1.2-1.5 AF); PROTEIN: 63-84 g/day (1.5-2 g/kg IBW); FLUID: 1875 mL/day (25 mL/kg) -- with consideration for cancer, age, poor PO intake

## 2022-03-25 NOTE — DIETITIAN INITIAL EVALUATION ADULT. - PERSON TAUGHT/METHOD
family/Discussed importance of po intake to prevent muscle mass loss./verbal instruction/patient instructed/daughter instructed

## 2022-03-25 NOTE — DIETITIAN INITIAL EVALUATION ADULT. - ORAL INTAKE PTA/DIET HISTORY
Pt had small frequent meals at home + snacks. Pt watches her diet and consumes low sodium meals. NKFA; does not have any restrictive cultural/Alevism food preferences. Denies taking vitamins or minerals. Pt does not drink protein shake supplements.

## 2022-03-25 NOTE — CONSULT NOTE ADULT - ASSESSMENT
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 Patient is being evaluated today due to her acute Hgb drop 7.5 -> 6.7.    #Normocytic Anemia  - Hx of anemia since January 2021, where Hgb found to be 6.5 - s/p 3u pRBC and iron infusions outpatient. Presumed GIB, followed outpatient w/ Dr Montano. Last colonoscopy ~7 years ago w/ normal results per patient. Never had EGD. Patient was put on Eliquis May 2021 for Afib, last taken January 2022. Patient scheduled for capsule endoscopy next week, but canceled due to her recent admission.   - Patient admitted on 3/16 due to fall, s/p R femur open reduction w/ internal fixation on 3/21. Patient has received a total of 3u pRBC during her hospital course. Low Hgb presumed due to her recent procedure.  - Hgb drop today 7.5 -> 6.5. Scheduled to receive 1u pRBC today.  - INR wnl, Hgb 6.7( 7.5), HCT 20.8 (<23.6)  - No evidence of gross bleeding. Pt denies lightheadedness, N/V/D, change in bowel habits, bloody or dark stools.      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 Patient is being evaluated today due to her acute Hgb drop 7.5 -> 6.7.    #Normocytic Anemia  - Hx of anemia since January 2021, where Hgb found to be 6.5 - s/p 3u pRBC and iron infusions outpatient. Presumed GIB, followed outpatient w/ Dr Montano. Last colonoscopy ~7 years ago w/ normal results per patient. Never had EGD. Patient was put on Eliquis May 2021 for Afib, last taken January 2022. Patient scheduled for capsule endoscopy next week, but canceled due to her recent admission.   - Patient admitted on 3/16 due to fall, s/p R femur open reduction w/ internal fixation on 3/21. Patient has received a total of 3u pRBC during her hospital course. Low Hgb presumed due to her recent procedure.  - Hgb drop today 7.5 -> 6.5. Scheduled to receive 1u pRBC today.  - INR wnl, Hgb 6.7( 7.5), HCT 20.8 (<23.6)  - No evidence of gross bleeding. Pt denies lightheadedness, N/V/D, change in bowel habits, bloody or dark stools.      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 Patient is being evaluated today due to her acute Hgb drop 7.5 -> 6.7.    #Normocytic Anemia - component of iron deficiency  - Hx of anemia since January 2021, where Hgb found to be 6.5 - s/p 3u pRBC and iron infusions outpatient. Presumed GIB, followed outpatient w/ Dr Montano. Last colonoscopy ~7 years ago w/ normal results per patient. Never had EGD. Patient was put on Eliquis May 2021 for Afib, last taken January 2022. Patient scheduled for capsule endoscopy next week, but canceled due to her recent admission.   - Patient admitted on 3/16 due to fall, s/p R femur open reduction w/ internal fixation on 3/21. Patient has received a total of 3u pRBC during her hospital course. Low Hgb presumed due to her recent procedure.  - Hgb drop today 7.5 -> 6.5. Scheduled to receive 1u pRBC today.  - INR wnl, Hgb 6.7( 7.5), HCT 20.8 (<23.6)  - No evidence of gross bleeding. Pt denies lightheadedness, N/V/D, change in bowel habits, bloody or dark stools.      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 Patient is being evaluated today due to her acute Hgb drop 7.5 -> 6.7.    #Normocytic Anemia - component of iron deficiency  - Hx of anemia since January 2021, where Hgb found to be 6.5 - s/p 3u pRBC and iron infusions outpatient. Presumed GIB, followed outpatient w/ Dr Montano. Last colonoscopy ~7 years ago w/ normal results per patient. Never had EGD. Patient was put on Eliquis May 2021 for Afib, last taken January 2022. Patient scheduled for capsule endoscopy next week, but canceled due to her recent admission.   - Patient admitted on 3/16 due to fall, s/p R femur open reduction w/ internal fixation on 3/21. Patient has received a total of 3u pRBC during her hospital course. Low Hgb presumed due to her recent procedure.  - Hgb drop today 7.5 -> 6.5. Scheduled to receive 1u pRBC today.  - INR wnl, Hgb 6.7( 7.5), HCT 20.8 (<23.6)  - No evidence of gross bleeding. Pt denies lightheadedness, N/V/D, change in bowel habits, bloody or dark stools.      #Recs  - Will schedule for EGD/Colonoscopy early next week  - Get cardio clearance for procedure  - Consider starting short acting therapeutic anticoagulation for her AFib  - Target Hgb >7  - protonix 40 bid  - Trend CBC  will f/up

## 2022-03-25 NOTE — DIETITIAN INITIAL EVALUATION ADULT. - NAME AND PHONE
Nutrition Intervention: Meals and medical food supplements; Nutrition Monitoring: Diet order, PO intake, weights, labs, NFPF, body composition, tolerance to medical food supplements.

## 2022-03-25 NOTE — PROGRESS NOTE ADULT - ASSESSMENT
Chronic diastolic heart failure / Anemia / Atrial flutter / CAD/ Distal Fx fracture s/p mechanical fall    Patient remains euvolemic on exam- POCUS exam: IVC collapsing   Hgb 6.7- transfuse 2 units PRBCs, give Furosemide 10mg IVP after 1st unit  Get BMP daily   Mag 2.0- replete   Maintain potassium >4.0, Mg >2.1  Strict intake and output  Daily weight   Femur fx as per ortho/trauma team  Anemia workup as per GI/primary team   Patient is at moderate risk for CV complications, avoid excessive fluid administration and hypotension. Use narcotics cautiously.  Plan discussed with primary team     Chronic diastolic heart failure / Anemia / Atrial flutter / CAD/ Distal Fx fracture s/p mechanical fall    Patient remains euvolemic on exam- POCUS exam: IVC collapsing   Hgb 6.7- transfuse 2 units PRBCs, give Furosemide 10mg IVP after 1st unit  Get BMP daily   Mag 2.0- replete   Maintain potassium >4.0, Mg >2.1  Strict intake and output  Daily weight   Femur fx as per ortho/trauma team  Anemia workup as per GI/primary team   She is stable from HF standpoint to undergoing GI procedure. Moderate risk for CV complications, avoid excessive fluid administration   Plan discussed with primary team

## 2022-03-25 NOTE — PROGRESS NOTE ADULT - SUBJECTIVE AND OBJECTIVE BOX
ORTHO PROGRESS NOTE     87yFemale POD #4      S/P ORIF, fracture, distal femur        Patient seen and examined at bedside . The patient is awake and alert in NAD. No complaints of chest pain, SOB, N/V.    MEDICATIONS  (STANDING):  cholecalciferol 2000 Unit(s) Oral daily  enoxaparin Injectable 40 milliGRAM(s) SubCutaneous every 24 hours  HYDROmorphone PCA (1 mG/mL) 30 milliLiter(s) PCA Continuous PCA Continuous  pantoprazole    Tablet 40 milliGRAM(s) Oral before breakfast  prednisoLONE acetate 1% Suspension 1 Drop(s) Right EYE daily  senna 2 Tablet(s) Oral at bedtime  simvastatin 20 milliGRAM(s) Oral at bedtime  sodium chloride 0.9%. 1000 milliLiter(s) (60 mL/Hr) IV Continuous <Continuous>    MEDICATIONS  (PRN):  acetaminophen     Tablet .. 650 milliGRAM(s) Oral every 6 hours PRN Moderate Pain (4 - 6), Severe Pain (7 - 10)  morphine  - Injectable 2 milliGRAM(s) IV Push every 4 hours PRN Moderate Pain (4 - 6)  naloxone Injectable 0.1 milliGRAM(s) IV Push every 3 minutes PRN For ANY of the following changes in patient status:  A. RR LESS THAN 10 breaths per minute, B. Oxygen saturation LESS THAN 90%, C. Sedation score of 6  ondansetron Injectable 4 milliGRAM(s) IV Push every 6 hours PRN Nausea      Vital Signs Last 24 Hrs  T(C): 37.2 (25 Mar 2022 05:23), Max: 37.2 (25 Mar 2022 05:23)  T(F): 98.9 (25 Mar 2022 05:23), Max: 98.9 (25 Mar 2022 05:23)  HR: 83 (25 Mar 2022 05:23) (78 - 85)  BP: 116/56 (25 Mar 2022 05:23) (96/44 - 118/54)  BP(mean): --  RR: 18 (25 Mar 2022 05:23) (18 - 18)  SpO2: --    PE:   Dressing C/D/I, slight saturation laterally, dressings changed   Compartments soft and compressible  Motor intact distally  SILT distally  CR<2sec  palpable pulses                               6.7    8.73  )-----------( 195      ( 25 Mar 2022 06:40 )             20.8     03    131<L>  |  99  |  30<H>  ----------------------------<  114<H>  4.6   |  23  |  0.7    Ca    7.5<L>      25 Mar 2022 06:40  Mg     2.0         TPro  4.3<L>  /  Alb  2.1<L>  /  TBili  0.8  /  DBili  x   /  AST  30  /  ALT  11  /  AlkPhos  102  25    PT/INR - ( 23 Mar 2022 10:31 )   PT: 12.70 sec;   INR: 1.11 ratio         PTT - ( 23 Mar 2022 10:31 )  PTT:31.5 sec  Urinalysis Basic - ( 24 Mar 2022 20:28 )    Color: Light Yellow / Appearance: Clear / S.008 / pH: x  Gluc: x / Ketone: Negative  / Bili: Negative / Urobili: <2 mg/dL   Blood: x / Protein: Negative / Nitrite: Negative   Leuk Esterase: Moderate / RBC: 2 /HPF / WBC 4 /HPF   Sq Epi: x / Non Sq Epi: 0 /HPF / Bacteria: 0.0        22 @ 07:01  -  22 @ 07:00  --------------------------------------------------------  IN: 360 mL / OUT: 2600 mL / NET: -2240 mL          A/P: 87yFemale POD #4    S/P  ORIF, fracture, distal femur, doing well. Limited progress with PT. PLanned for discharge to inpatient rehab.      Doing well   OOB to Chair   WBAT RLE  PT/OT  Pain control   Ext icing/elevation  Incentive Spirometry   DVT Prophylaxis   Discharge planning

## 2022-03-25 NOTE — PROGRESS NOTE ADULT - SUBJECTIVE AND OBJECTIVE BOX
----------Daily Progress Note----------    HISTORY OF PRESENT ILLNESS:  Patient is a 87y old Female who presents with a chief complaint of fall (22 Mar 2022 10:16)    Currently admitted to medicine with the primary diagnosis of Displaced fracture of right femur       Today is hospital day 9d.     INTERVAL HOSPITAL COURSE / OVERNIGHT EVENTS:    Patient was examined and seen at bedside. This morning she is resting comfortably in bed and reports no new issues or overnight events.     Review of Systems: Otherwise unremarkable     <<<<<PAST MEDICAL & SURGICAL HISTORY>>>>>  High cholesterol    Kidney stone    Bladder polyp    Shingles  affecting right eye    Atrial flutter    H/O cystoscopy    H/O total knee replacement, right    H/O total knee replacement, left    Cancer of mouth    History of cardioversion      ALLERGIES  No Known Allergies      Home Medications:  Lasix 20 mg oral tablet: 1 tab(s) orally 2 times a week then the following week take it 3 times per week (16 Mar 2022 21:45)  metoprolol succinate 25 mg oral tablet, extended release: 0.5 tab(s) orally once a day (16 Mar 2022 21:45)  prednisoLONE acetate 1% ophthalmic suspension: 1 drop(s) in the right eye once a day (16 Mar 2022 21:45)  Vitamin D2 50 mcg (2000 intl units) oral capsule: 1 cap(s) orally once a day (16 Mar 2022 21:45)        MEDICATIONS  STANDING MEDICATIONS  cholecalciferol 2000 Unit(s) Oral daily  enoxaparin Injectable 40 milliGRAM(s) SubCutaneous every 24 hours  HYDROmorphone PCA (1 mG/mL) 30 milliLiter(s) PCA Continuous PCA Continuous  pantoprazole    Tablet 40 milliGRAM(s) Oral before breakfast  prednisoLONE acetate 1% Suspension 1 Drop(s) Right EYE daily  senna 2 Tablet(s) Oral at bedtime  simvastatin 20 milliGRAM(s) Oral at bedtime  sodium chloride 0.9%. 1000 milliLiter(s) IV Continuous <Continuous>    PRN MEDICATIONS  acetaminophen     Tablet .. 650 milliGRAM(s) Oral every 6 hours PRN  morphine  - Injectable 2 milliGRAM(s) IV Push every 4 hours PRN  naloxone Injectable 0.1 milliGRAM(s) IV Push every 3 minutes PRN  ondansetron Injectable 4 milliGRAM(s) IV Push every 6 hours PRN    VITALS:  T(F): 98.9  HR: 83  BP: 116/56  RR: 18  SpO2: --    <<<<<LABS>>>>>                        6.7    8.73  )-----------( 195      ( 25 Mar 2022 06:40 )             20.8     03-    131<L>  |  99  |  30<H>  ----------------------------<  114<H>  4.6   |  23  |  0.7    Ca    7.5<L>      25 Mar 2022 06:40  Mg     2.0         TPro  4.3<L>  /  Alb  2.1<L>  /  TBili  0.8  /  DBili  x   /  AST  30  /  ALT  11  /  AlkPhos  102      PT/INR - ( 23 Mar 2022 10:31 )   PT: 12.70 sec;   INR: 1.11 ratio         PTT - ( 23 Mar 2022 10:31 )  PTT:31.5 sec  Urinalysis Basic - ( 24 Mar 2022 20:28 )    Color: Light Yellow / Appearance: Clear / S.008 / pH: x  Gluc: x / Ketone: Negative  / Bili: Negative / Urobili: <2 mg/dL   Blood: x / Protein: Negative / Nitrite: Negative   Leuk Esterase: Moderate / RBC: 2 /HPF / WBC 4 /HPF   Sq Epi: x / Non Sq Epi: 0 /HPF / Bacteria: 0.0          [id number]        <<<<<RADIOLOGY>>>>>    GENERAL: resting in bed comfortably  PULMONARY: Clear to auscultation bilaterally. No rales, rhonchi, or wheezing.  CARDIOVASCULAR: Regular rate and rhythm, S1-S2, no murmurs  GASTROINTESTINAL: Soft, non-tender, non-distended, no guarding.      ----------------------------------------------------------------------------------------------------------------------------------------------------------------------------------------------- ----------Daily Progress Note----------    HISTORY OF PRESENT ILLNESS:  Patient is a 87y old Female who presents with a chief complaint of fall (22 Mar 2022 10:16)    Currently admitted to medicine with the primary diagnosis of Displaced fracture of right femur       Today is hospital day 9d.     INTERVAL HOSPITAL COURSE / OVERNIGHT EVENTS:    Patient was examined and seen at bedside. This morning she is resting comfortably in bed and reports no new issues or overnight events.     Patient with acute drop in hgb 7.5 -> 6.7; currently ordered for 1 unit prbc; no reports of active visible bleed     Review of Systems: Otherwise unremarkable     <<<<<PAST MEDICAL & SURGICAL HISTORY>>>>>  High cholesterol    Kidney stone    Bladder polyp    Shingles  affecting right eye    Atrial flutter    H/O cystoscopy    H/O total knee replacement, right    H/O total knee replacement, left    Cancer of mouth    History of cardioversion      ALLERGIES  No Known Allergies      Home Medications:  Lasix 20 mg oral tablet: 1 tab(s) orally 2 times a week then the following week take it 3 times per week (16 Mar 2022 21:45)  metoprolol succinate 25 mg oral tablet, extended release: 0.5 tab(s) orally once a day (16 Mar 2022 21:45)  prednisoLONE acetate 1% ophthalmic suspension: 1 drop(s) in the right eye once a day (16 Mar 2022 21:45)  Vitamin D2 50 mcg (2000 intl units) oral capsule: 1 cap(s) orally once a day (16 Mar 2022 21:45)        MEDICATIONS  STANDING MEDICATIONS  cholecalciferol 2000 Unit(s) Oral daily  enoxaparin Injectable 40 milliGRAM(s) SubCutaneous every 24 hours  HYDROmorphone PCA (1 mG/mL) 30 milliLiter(s) PCA Continuous PCA Continuous  pantoprazole    Tablet 40 milliGRAM(s) Oral before breakfast  prednisoLONE acetate 1% Suspension 1 Drop(s) Right EYE daily  senna 2 Tablet(s) Oral at bedtime  simvastatin 20 milliGRAM(s) Oral at bedtime  sodium chloride 0.9%. 1000 milliLiter(s) IV Continuous <Continuous>    PRN MEDICATIONS  acetaminophen     Tablet .. 650 milliGRAM(s) Oral every 6 hours PRN  morphine  - Injectable 2 milliGRAM(s) IV Push every 4 hours PRN  naloxone Injectable 0.1 milliGRAM(s) IV Push every 3 minutes PRN  ondansetron Injectable 4 milliGRAM(s) IV Push every 6 hours PRN    VITALS:  T(F): 98.9  HR: 83  BP: 116/56  RR: 18  SpO2: --    <<<<<LABS>>>>>                        6.7    8.73  )-----------( 195      ( 25 Mar 2022 06:40 )             20.8     03-25    131<L>  |  99  |  30<H>  ----------------------------<  114<H>  4.6   |  23  |  0.7    Ca    7.5<L>      25 Mar 2022 06:40  Mg     2.0     -    TPro  4.3<L>  /  Alb  2.1<L>  /  TBili  0.8  /  DBili  x   /  AST  30  /  ALT  11  /  AlkPhos  102  03-    PT/INR - ( 23 Mar 2022 10:31 )   PT: 12.70 sec;   INR: 1.11 ratio         PTT - ( 23 Mar 2022 10:31 )  PTT:31.5 sec  Urinalysis Basic - ( 24 Mar 2022 20:28 )    Color: Light Yellow / Appearance: Clear / S.008 / pH: x  Gluc: x / Ketone: Negative  / Bili: Negative / Urobili: <2 mg/dL   Blood: x / Protein: Negative / Nitrite: Negative   Leuk Esterase: Moderate / RBC: 2 /HPF / WBC 4 /HPF   Sq Epi: x / Non Sq Epi: 0 /HPF / Bacteria: 0.0          [id number]        <<<<<RADIOLOGY>>>>>    GENERAL: resting in bed comfortably  PULMONARY: Clear to auscultation bilaterally. No rales, rhonchi, or wheezing.  CARDIOVASCULAR: Regular rate and rhythm, S1-S2, no murmurs  GASTROINTESTINAL: Soft, non-tender, non-distended, no guarding.      ----------------------------------------------------------------------------------------------------------------------------------------------------------------------------------------------- ----------Daily Progress Note----------    HISTORY OF PRESENT ILLNESS:  Patient is a 87y old Female who presents with a chief complaint of fall (22 Mar 2022 10:16)    Currently admitted to medicine with the primary diagnosis of Displaced fracture of right femur       Today is hospital day 9d.     INTERVAL HOSPITAL COURSE / OVERNIGHT EVENTS:    Patient was examined and seen at bedside. This morning she is resting comfortably in bed and reports no new issues or overnight events.     Patient with acute drop in hgb 7.5 -> 6.7; currently ordered for 1 unit prbc; no reports of active visible bleed     Review of Systems: Otherwise unremarkable     <<<<<PAST MEDICAL & SURGICAL HISTORY>>>>>  High cholesterol    Kidney stone    Bladder polyp    Shingles  affecting right eye    Atrial flutter    H/O cystoscopy    H/O total knee replacement, right    H/O total knee replacement, left    Cancer of mouth    History of cardioversion      ALLERGIES  No Known Allergies      Home Medications:  Lasix 20 mg oral tablet: 1 tab(s) orally 2 times a week then the following week take it 3 times per week (16 Mar 2022 21:45)  metoprolol succinate 25 mg oral tablet, extended release: 0.5 tab(s) orally once a day (16 Mar 2022 21:45)  prednisoLONE acetate 1% ophthalmic suspension: 1 drop(s) in the right eye once a day (16 Mar 2022 21:45)  Vitamin D2 50 mcg (2000 intl units) oral capsule: 1 cap(s) orally once a day (16 Mar 2022 21:45)        MEDICATIONS  STANDING MEDICATIONS  cholecalciferol 2000 Unit(s) Oral daily  enoxaparin Injectable 40 milliGRAM(s) SubCutaneous every 24 hours  HYDROmorphone PCA (1 mG/mL) 30 milliLiter(s) PCA Continuous PCA Continuous  pantoprazole    Tablet 40 milliGRAM(s) Oral before breakfast  prednisoLONE acetate 1% Suspension 1 Drop(s) Right EYE daily  senna 2 Tablet(s) Oral at bedtime  simvastatin 20 milliGRAM(s) Oral at bedtime  sodium chloride 0.9%. 1000 milliLiter(s) IV Continuous <Continuous>    PRN MEDICATIONS  acetaminophen     Tablet .. 650 milliGRAM(s) Oral every 6 hours PRN  morphine  - Injectable 2 milliGRAM(s) IV Push every 4 hours PRN  naloxone Injectable 0.1 milliGRAM(s) IV Push every 3 minutes PRN  ondansetron Injectable 4 milliGRAM(s) IV Push every 6 hours PRN    VITALS:  T(F): 98.9  HR: 83  BP: 116/56  RR: 18  SpO2: --    <<<<<LABS>>>>>                        6.7    8.73  )-----------( 195      ( 25 Mar 2022 06:40 )             20.8     03-25    131<L>  |  99  |  30<H>  ----------------------------<  114<H>  4.6   |  23  |  0.7    Ca    7.5<L>      25 Mar 2022 06:40  Mg     2.0     -    TPro  4.3<L>  /  Alb  2.1<L>  /  TBili  0.8  /  DBili  x   /  AST  30  /  ALT  11  /  AlkPhos  102  03-25    PT/INR - ( 23 Mar 2022 10:31 )   PT: 12.70 sec;   INR: 1.11 ratio         PTT - ( 23 Mar 2022 10:31 )  PTT:31.5 sec  Urinalysis Basic - ( 24 Mar 2022 20:28 )    Color: Light Yellow / Appearance: Clear / S.008 / pH: x  Gluc: x / Ketone: Negative  / Bili: Negative / Urobili: <2 mg/dL   Blood: x / Protein: Negative / Nitrite: Negative   Leuk Esterase: Moderate / RBC: 2 /HPF / WBC 4 /HPF   Sq Epi: x / Non Sq Epi: 0 /HPF / Bacteria: 0.0          [id number]        <<<<<RADIOLOGY>>>>>    GENERAL: resting in bed comfortably  HNENT: NCAT MMM   PULMONARY: Clear to auscultation bilaterally. No rales, rhonchi, or wheezing.  CARDIOVASCULAR: Regular rate and rhythm, S1-S2, no murmurs  GASTROINTESTINAL: Soft, non-tender, non-distended, no guarding.  MSK: Warm well perfused no edema   Neuro: AAOx3, moving extremities appropriately       -----------------------------------------------------------------------------------------------------------------------------------------------------------------------------------------------

## 2022-03-25 NOTE — PROGRESS NOTE ADULT - ASSESSMENT
86 y/o F a PMH of dyslipidemia, renal stone, shingles, HFpEF, GI bleeds, A-fib (not on AC due to GI bleed) and hypertension presented to the ED after tripping and falling which resulted in a complete and displaced fracture of right femur    # R Periprosthetic Femur Fx  - s/p R ORIF femur 03/21   - ortho following   - s/p ancef  - WBAT RLE; OOBTC per Ortho  - PT/Physiatry on board.   - DVT ppx: SCD, lovenox  - Pain control   - Hb 7.3 > 7.5. expected loss from surgery. no transfusion. Monitor.   - Eventual inpatient rehab at [place] or [facility]; pending Dr. Whiteside clearance     # HFpEF not in exacerbation  # P-Afib  no longer on AC   - last    - 3/23 BP borderline - held home metoprolol 12.5 mg QD > Resume by 3/25 if BP tolerates.   - On Lasix 2 times per week/3 times per week alternating - Holding d/t Hyponatremia.   - EKG demonstrating NSR  - Started on IV iron for 5 days as per HF     # Anemia suspected 2/2 GIB but workup neg to date  - s/p 1 PRBC  pre-op for Hgb>10, with lasix 10 IV   - Iron = 26 // TIBC WNL // %Fe sat = 6  -Ferratin, folate and B12 WNL     # Hyponatremia  - 136-->130-->126-->129->128->123> 126.   NS @ 60cc/hr from 3/24.   - Uosm/Cara ordered  - patient is euvolemic on exam. will encourage increased PO intake and monitor for now      # Hypomagnesia  replete prn    # DLD  - c/w Statin     # h/o Tonsillar Cancer  - s/p surgical resection of mass from soft palate, in future ensure anesthesia is aware pt has obturator covering hole in post oropharynx prior to surgery    # DVT PPX: SCD, Lovenox   # GI PPX: PPI   # Activity :OOBTC   # Full code .    Patient needs cardiology clearance for [facility]. Dr. Whiteside consulted.   86 y/o F a PMH of dyslipidemia, renal stone, shingles, HFpEF, GI bleeds, A-fib (not on AC due to GI bleed) and hypertension presented to the ED after tripping and falling which resulted in a complete and displaced fracture of right femur    # R Periprosthetic Femur Fx  - s/p R ORIF femur 03/21   - ortho following   - s/p ancef  - WBAT RLE; OOBTC per Ortho  - PT/Physiatry on board.   - DVT ppx: SCD, lovenox  - Pain control   - Eventual inpatient rehab at [place] or [facility]; pending Dr. Whiteside clearance     # HFpEF not in exacerbation  # P-Afib  no longer on AC   - last    - 3/23 BP borderline - held home metoprolol 12.5 mg QD > Resume by 3/25 if BP tolerates.   - On Lasix 2 times per week/3 times per week alternating - Holding d/t Hyponatremia.   - EKG demonstrating NSR  - Started on IV iron for 5 days as per HF     # acute on chronic anemia   - s/p 1 PRBC  pre-op for Hgb>10, with lasix 10 IV   - Iron = 26 // TIBC WNL // %Fe sat = 6  -Ferratin, folate and B12 WNL, MCV 90  - acute drop in hgb today 7.5 - > 6.5; currently to recieve 1 unit of PRBC (total of 4 units transfused during this admission)  - Patient reportedly had followed up with Dr. Payne and received IV venofer  - Had a previous colonoscopy ~ 7 years ago however cannot accurately recall results  - Patient was scheduled for capsule endoscopy next week by Dr. Paulino  - F/u GI recommendations for possible capsule endoscopy inpatient as on discharge patient to go to [facility]    # Hyponatremia  - 136-->130-->126-->129->128->123> 126>131  NS @ 60cc/hr from 3/24.   - Uosm/Cara ordered  - patient is euvolemic on exam. will encourage increased PO intake and monitor for now    # Hypomagnesia  replete prn    # DLD  - c/w Statin     # h/o Tonsillar Cancer  - s/p surgical resection of mass from soft palate, in future ensure anesthesia is aware pt has obturator covering hole in post oropharynx prior to surgery    # DVT PPX: SCD, Lovenox   # GI PPX: PPI   # Activity :OOBTC   # Full code .    Patient needs cardiology clearance for [facility]. Dr. Whiteside consulted.   f/u GI recommendations  f/u repeat cbc and BMP (if na normal d/c iv fluids)

## 2022-03-25 NOTE — DIETITIAN INITIAL EVALUATION ADULT. - OTHER INFO
Weight hx: UBW is 75.45 KG (166#) - checked 1 week ago. Currently dosing weight is 75 KG (165#); weight fluctuations likely d/t fluid shifts. No weight loss reported.    Pertinent Medical Information:  -- 88 y/o female with a PMHx of dyslipidemia, renal stone, shingles, HFpEF, GI bleeds, A-fib (not on AC due to GI bleed) and hypertension presented to the ED after tripping and falling which resulted in a complete and displaced fracture of right femur.  # R Periprosthetic Femur Fx  # HFpEF not in exacerbation  # P-Afib  no longer on AC   # acute on chronic anemia   # Hyponatremia  # Hypomagnesia  # DLD  # h/o Tonsillar Cancer  # DVT     Pertinent Subjective Information: Currently on a DASH/TLC diet order as of 03/21. Pt has poor appetite; consuming 25% of meals. Family at bedside reports pt has swallowing difficulties at times and eats very slowly. Pt denies nausea or vomiting.

## 2022-03-25 NOTE — DIETITIAN INITIAL EVALUATION ADULT. - ORAL NUTRITION SUPPLEMENTS
Ensure Enlive 3x/day - will provide 1050 kcal/day total, 60 g/day total + MAGIC CUP 2x/day - will provide 580 kcal/day total, 18g pro/day total -- to optimize kcal/pro intake.

## 2022-03-25 NOTE — DIETITIAN INITIAL EVALUATION ADULT. - PHYSCIAL ASSESSMENT
obese Oral; no chewing difficulties; swallowing difficulties; Cognitive: alert; Edema: edema 2+ to right leg; Skin: bruised, surgical incision to right thigh and greater trochanter; GI: nondistended, soft/nontender, LBM: 3/23 - 1x per pt;

## 2022-03-25 NOTE — PROGRESS NOTE ADULT - SUBJECTIVE AND OBJECTIVE BOX
Date of Admission: 3/18/22    Interval History:  - Patient assessed resting in bed, NAD  - States pain well controlled s/p ORIF, no complaints at this time     Chief Complaint: Patient is a 87y old  Female who presents with a chief complaint of Fall (17 Mar 2022 09:42)    HISTORY OF PRESENT ILLNESS: 86 F PMH HFpEF, Afib on Eliquis, Anemia suspected to be secondary to GIB ( but never proven s/p neg EGD and C-scope), HTN, and HLD, hx of tonsillar ca s/p resection w/obturator in placeto cover hole in pallate, BIBEMS s/p mechanical fall at home. Pt lives with her daughter and went to her old home to collect some documents and tripped in the doorway and landed on her right knee. Had immediate pain, unable to ambulate afterwards. Pt normally ambulates w/ a cane. Pt underwent left TKA by Dr. Morton in  and a right TKA by Dr. Perkins at [facility] approx 8 years ago. right PATRICK Type II periprosthetic distal femur fracture. Pt placed in a knee immobilizer.   ED COURSE: As above, HD stable (16 Mar 2022 20:55)    Patient and daughter bedside report patient had a mechanical fall at home, denies LOC/dizziness/lightheadedness. Also denies head trauma. Patient sustained injury to R femur. Prior to fall, patient states she was ambulating without issue. Endorses a low Na diet and compliance with home medications. Denies chest pain, palpitations, SOB, abdominal discomfort, n/v, or loss of appetite.    PAST MEDICAL & SURGICAL HISTORY:  HLD  HFpEF       FAMILY HISTORY:  Mother:  at 99 old age  Father:  at 92 MRSA    SOCIAL HISTORY:    Patient denies smoking, alcohol or drug use    Allergies  No Known Allergies    Intolerances    PHYSICAL EXAM:  General Appearance: well appearing, normal for age and gender. 	  Neck: normal JVP, no bruit.   Eyes:  Extra Ocular muscles intact.   Cardiovascular: regular rate and rhythm S1 S2, No JVD, No murmurs   Respiratory: Lungs clear to auscultation	  Psychiatry: Alert and oriented x 3, Mood & affect appropriate  Gastrointestinal:  Soft, Non-tender  Skin/Integumen: No rashes, No ecchymoses, No cyanosis	  Neurologic: Non-focal  Musculoskeletal/ extremities: RLE limited ROM, No clubbing, cyanosis, +R LE edema  Vascular: Peripheral pulses palpable 2+ bilaterally      CARDIAC MARKERS:  Serum Pro-Brain Natriuretic Peptide: 669 pg/mL (22 @ 19:55)        PREVIOUS DIAGNOSTIC TESTING:    TTE 3/18/22    Summary:   1. Normal global left ventricular systolic function.   2. Moderately enlarged left atrium.   3. Normal right atrial size.   4. Mild mitral annular calcification.   5. Mild mitral valve regurgitation.   6. Mild tricuspid regurgitation.   7. PSAP 45.   8. Mild aortic regurgitation.   9. Sclerotic aortic valve with decreased opening.  10. Peak gradient of 20 with a mean of 11 c/w Mild AS.  11. Mild pulmonic valve regurgitation.  12. Asc aorta 3.6cm.      TTE 21  Summary:   1. LV Ejection Fraction by Pinto's Method with a biplane EF of 65 %.   2. Normal global left ventricular systolic function.   3. Moderate left ventricular hypertrophy.   4. Spectral Doppler shows pseudonormal pattern of left ventricular myocardial filling (Grade II diastolic dysfunction).   5. Severely enlarged left atrium.   6. Severely enlarged right atrium.   7. Sclerotic aortic valve with normal opening.   8. Mild aortic regurgitation.   9. Thickening and calcification of the anterior and posterior mitral leaflets.  10. Mild-to-moderate mitral regurgitation.  11. Moderate tricuspid regurgitation.  12. Estimated pulmonary artery systolic pressure is 36.7 mmHg assuming a right atrial pressure of 15 mmHg, which is consistent with borderline pulmonary hypertension.      Home Medications:    MEDICATIONS  (STANDING):  apixaban 5 milliGRAM(s) Oral two times a day  metoprolol tartrate 25 milliGRAM(s) Oral two times a day  simvastatin 20 milliGRAM(s) Oral at bedtime    MEDICATIONS  (PRN):

## 2022-03-25 NOTE — CONSULT NOTE ADULT - ATTENDING COMMENTS
s/p r periprosthetic distal femur fx  nwb  for or once optimized   will need orif/imn  dvt ppx
Trauma Attending H&P Attestation    Patient seen and evaluated with the trauma team in the trauma bay upon arrival. All pertinent labs and radiographic imaging reviewed, pending final reports. Outpatient medications reviewed, including the presence of anticoagulants, if applicable. I agree with the resident's note above, including the physical exam findings, assessment and plan as documented with the following adjustments.     Trauma Level: [ ] Code  [ ] Alert  [ x] Consult [ ] Transfer in  Activation by:  [x ] ED physician [ ] EMS  Intubated in Field? [ ] Yes [x ] No  Intubated in ED? [ ] Yes [x ] No  Intubated in Trauma [place]? [ ] Yes [x ] No    LIZA HERNANDEZ Patient is a 87y old  Female who presents with a chief complaint of fall on her knees and sustained periprosthetic fracture femur    Patient presented with GCS [15 ]  upon arrival to the trauma bay.  Allergies  No Known Allergies  Intolerances    PAST MEDICAL & SURGICAL HISTORY:  High cholesterol  Kidney stone  Bladder polyp  Shingles affecting right eye  Atrial flutter  H/O cystoscopy  H/O total knee replacement, right  H/O total knee replacement, left  Cancer of mouth  History of cardioversion    On AC/Antiplatelets [ ] Yes [x ] No              [ ] NOVACs, [ ] Coumadin, [ ] ASA, [ ] Antiplatelets     Vital Signs Last 24 Hrs  T(C): 37.1 (16 Mar 2022 12:05), Max: 37.1 (16 Mar 2022 12:05)  T(F): 98.7 (16 Mar 2022 12:05), Max: 98.7 (16 Mar 2022 12:05)  HR: 60 (16 Mar 2022 12:05) (60 - 60)  BP: 126/58 (16 Mar 2022 12:05) (126/58 - 126/58)  BP(mean): --  RR: 16 (16 Mar 2022 12:05) (16 - 16)  SpO2: 95% (16 Mar 2022 12:05) (95% - 95%)    PE: bilateral knees swelling     Assessment: periprosthetic fracture     PLAN  - supportive care  - GI/DVT prophylaxis  - pain management  - repeat studies as needed  - complete and follow up on trauma work up included but not limites to                          [x ] CXR [ x] PXR [x ] Extremities X-RAYs                          [x ] NCHCT [ x] C-Spine CT [x ] CT Chest [x ] CT Abdomen/Pelvis                          [x ] FAST [ ] Other                          [x ] Trauma Labs                          [ ] Toxicology   - Follow up Consults  [ ] Neurosurgery [x ] Orthopaedics [ ] Plastics [ ] Fascial/OMFS [ ] Opthalmology [ ] Medicine [ ] Cardiology [ ] SICU [ ] Urology  - IV ABx give as indicated [ ] Yes [x ] No  - Tetanus given as indicated [ ] Yes [x ] No    Imelda Sauer MD, FACS  Trauma/ACS/Surgical Critical Care Attending
High-risk for perioperative major adverse cardiac events    There are no current cardiac contraindications that prohibit proceeding with the scheduled surgery/procedure.  This consult serves only as a perioperative cardiac risk stratification and evaluation to predict 30-day cardiac complications risk and mortality.  The decision to proceed with the surgery/procedure is made by the performing physician and the patient.
I edited the note

## 2022-03-25 NOTE — DIETITIAN INITIAL EVALUATION ADULT. - ADD RECOMMEND
1. Add Ensure Enlive 3x/day - will provide 1050 kcal/day total, 60 g/day total. 2. Add MAGIC CUP 2x/day - will provide 580 kcal/day total, 18g pro/day total. 3. Continue on a DASH/TLC diet order. 4. SLP c/s appreciated as pt reports swallowing difficulties. 5. Encourage PO intake and assist during meals when needed.

## 2022-03-26 LAB
ALBUMIN SERPL ELPH-MCNC: 2.5 G/DL — LOW (ref 3.5–5.2)
ALP SERPL-CCNC: 121 U/L — HIGH (ref 30–115)
ALT FLD-CCNC: 14 U/L — SIGNIFICANT CHANGE UP (ref 0–41)
ANION GAP SERPL CALC-SCNC: 12 MMOL/L — SIGNIFICANT CHANGE UP (ref 7–14)
AST SERPL-CCNC: 30 U/L — SIGNIFICANT CHANGE UP (ref 0–41)
BASOPHILS # BLD AUTO: 0.05 K/UL — SIGNIFICANT CHANGE UP (ref 0–0.2)
BASOPHILS NFR BLD AUTO: 0.5 % — SIGNIFICANT CHANGE UP (ref 0–1)
BILIRUB SERPL-MCNC: 1.3 MG/DL — HIGH (ref 0.2–1.2)
BUN SERPL-MCNC: 24 MG/DL — HIGH (ref 10–20)
CALCIUM SERPL-MCNC: 7.8 MG/DL — LOW (ref 8.5–10.1)
CHLORIDE SERPL-SCNC: 95 MMOL/L — LOW (ref 98–110)
CO2 SERPL-SCNC: 23 MMOL/L — SIGNIFICANT CHANGE UP (ref 17–32)
CREAT SERPL-MCNC: 0.7 MG/DL — SIGNIFICANT CHANGE UP (ref 0.7–1.5)
EGFR: 84 ML/MIN/1.73M2 — SIGNIFICANT CHANGE UP
EOSINOPHIL # BLD AUTO: 0.23 K/UL — SIGNIFICANT CHANGE UP (ref 0–0.7)
EOSINOPHIL NFR BLD AUTO: 2.5 % — SIGNIFICANT CHANGE UP (ref 0–8)
GLUCOSE SERPL-MCNC: 109 MG/DL — HIGH (ref 70–99)
HCT VFR BLD CALC: 28.7 % — LOW (ref 37–47)
HGB BLD-MCNC: 9.3 G/DL — LOW (ref 12–16)
IMM GRANULOCYTES NFR BLD AUTO: 3.3 % — HIGH (ref 0.1–0.3)
LYMPHOCYTES # BLD AUTO: 1.4 K/UL — SIGNIFICANT CHANGE UP (ref 1.2–3.4)
LYMPHOCYTES # BLD AUTO: 14.9 % — LOW (ref 20.5–51.1)
MAGNESIUM SERPL-MCNC: 2 MG/DL — SIGNIFICANT CHANGE UP (ref 1.8–2.4)
MCHC RBC-ENTMCNC: 29 PG — SIGNIFICANT CHANGE UP (ref 27–31)
MCHC RBC-ENTMCNC: 32.4 G/DL — SIGNIFICANT CHANGE UP (ref 32–37)
MCV RBC AUTO: 89.4 FL — SIGNIFICANT CHANGE UP (ref 81–99)
MONOCYTES # BLD AUTO: 0.87 K/UL — HIGH (ref 0.1–0.6)
MONOCYTES NFR BLD AUTO: 9.3 % — SIGNIFICANT CHANGE UP (ref 1.7–9.3)
NEUTROPHILS # BLD AUTO: 6.52 K/UL — HIGH (ref 1.4–6.5)
NEUTROPHILS NFR BLD AUTO: 69.5 % — SIGNIFICANT CHANGE UP (ref 42.2–75.2)
NRBC # BLD: 0 /100 WBCS — SIGNIFICANT CHANGE UP (ref 0–0)
PLATELET # BLD AUTO: 209 K/UL — SIGNIFICANT CHANGE UP (ref 130–400)
POTASSIUM SERPL-MCNC: 4.5 MMOL/L — SIGNIFICANT CHANGE UP (ref 3.5–5)
POTASSIUM SERPL-SCNC: 4.5 MMOL/L — SIGNIFICANT CHANGE UP (ref 3.5–5)
PROT SERPL-MCNC: 4.7 G/DL — LOW (ref 6–8)
RBC # BLD: 3.21 M/UL — LOW (ref 4.2–5.4)
RBC # FLD: 21.3 % — HIGH (ref 11.5–14.5)
SODIUM SERPL-SCNC: 130 MMOL/L — LOW (ref 135–146)
WBC # BLD: 9.38 K/UL — SIGNIFICANT CHANGE UP (ref 4.8–10.8)
WBC # FLD AUTO: 9.38 K/UL — SIGNIFICANT CHANGE UP (ref 4.8–10.8)

## 2022-03-26 PROCEDURE — 99232 SBSQ HOSP IP/OBS MODERATE 35: CPT

## 2022-03-26 RX ADMIN — SIMVASTATIN 20 MILLIGRAM(S): 20 TABLET, FILM COATED ORAL at 23:13

## 2022-03-26 RX ADMIN — HYDROMORPHONE HYDROCHLORIDE 30 MILLILITER(S): 2 INJECTION INTRAMUSCULAR; INTRAVENOUS; SUBCUTANEOUS at 12:10

## 2022-03-26 RX ADMIN — PANTOPRAZOLE SODIUM 40 MILLIGRAM(S): 20 TABLET, DELAYED RELEASE ORAL at 06:20

## 2022-03-26 RX ADMIN — Medication 1 DROP(S): at 12:10

## 2022-03-26 RX ADMIN — Medication 2000 UNIT(S): at 12:10

## 2022-03-26 RX ADMIN — SENNA PLUS 2 TABLET(S): 8.6 TABLET ORAL at 23:13

## 2022-03-26 RX ADMIN — ENOXAPARIN SODIUM 40 MILLIGRAM(S): 100 INJECTION SUBCUTANEOUS at 23:13

## 2022-03-26 NOTE — PROGRESS NOTE ADULT - SUBJECTIVE AND OBJECTIVE BOX
ORTHO PROGRESS NOTE     87yFemale POD #5  S/P ORIF, fracture, distal femur    Patient seen and examined at bedside . The patient is awake and alert in NAD. No complaints of chest pain, SOB, N/V. She received 1u pRBCs yesterday for Hgb of 6.7    MEDICATIONS  (STANDING):  cholecalciferol 2000 Unit(s) Oral daily  enoxaparin Injectable 40 milliGRAM(s) SubCutaneous every 24 hours  HYDROmorphone PCA (1 mG/mL) 30 milliLiter(s) PCA Continuous PCA Continuous  pantoprazole    Tablet 40 milliGRAM(s) Oral before breakfast  prednisoLONE acetate 1% Suspension 1 Drop(s) Right EYE daily  senna 2 Tablet(s) Oral at bedtime  simvastatin 20 milliGRAM(s) Oral at bedtime    MEDICATIONS  (PRN):  acetaminophen     Tablet .. 650 milliGRAM(s) Oral every 6 hours PRN Moderate Pain (4 - 6), Severe Pain (7 - 10)  morphine  - Injectable 2 milliGRAM(s) IV Push every 4 hours PRN Moderate Pain (4 - 6)  naloxone Injectable 0.1 milliGRAM(s) IV Push every 3 minutes PRN For ANY of the following changes in patient status:  A. RR LESS THAN 10 breaths per minute, B. Oxygen saturation LESS THAN 90%, C. Sedation score of 6  ondansetron Injectable 4 milliGRAM(s) IV Push every 6 hours PRN Nausea      Vital Signs Last 24 Hrs  T(C): 36.8 (26 Mar 2022 05:54), Max: 37.3 (25 Mar 2022 20:04)  T(F): 98.3 (26 Mar 2022 05:54), Max: 99.1 (25 Mar 2022 20:04)  HR: 79 (26 Mar 2022 05:54) (79 - 85)  BP: 143/65 (26 Mar 2022 05:54) (104/61 - 143/65)  BP(mean): --  RR: 20 (26 Mar 2022 05:54) (18 - 20)  SpO2: 95% (25 Mar 2022 20:04) (95% - 95%)    PE:   RLE  Dressing C/D/I   Compartments soft and compressible  Motor intact distally  SILT distally  CR<2sec  palpable pulses                           9.1    9.53  )-----------( 206      ( 25 Mar 2022 22:16 )             27.3     03    129<L>  |  97<L>  |  25<H>  ----------------------------<  156<H>  4.4   |  24  |  0.8    Ca    7.6<L>      25 Mar 2022 22:16  Mg     2.0         TPro  4.6<L>  /  Alb  2.3<L>  /  TBili  1.5<H>  /  DBili  x   /  AST  33  /  ALT  14  /  AlkPhos  117<H>        Urinalysis Basic - ( 24 Mar 2022 20:28 )    Color: Light Yellow / Appearance: Clear / S.008 / pH: x  Gluc: x / Ketone: Negative  / Bili: Negative / Urobili: <2 mg/dL   Blood: x / Protein: Negative / Nitrite: Negative   Leuk Esterase: Moderate / RBC: 2 /HPF / WBC 4 /HPF   Sq Epi: x / Non Sq Epi: 0 /HPF / Bacteria: 0.0        22 @ 07:01  -  22 @ 07:00  --------------------------------------------------------  IN: 0 mL / OUT: 800 mL / NET: -800 mL          A/P: 87yFemale POD #5  S/P  ORIF, fracture, distal femur    OOB to Chair   WBAT  PT/OT  Trend CBC, patient received 1u pRBCs yesteday for Hgb of 6.7. Most recent Hgb 9.1, continue to trend and transfuse as needed  Pain control   Ext icing/elevation  Incentive Spirometry   DVT Prophylaxis   Rest or management per primary

## 2022-03-26 NOTE — PROGRESS NOTE ADULT - ASSESSMENT
88 y/o F a PMH of dyslipidemia, renal stone, shingles, HFpEF, GI bleeds, Aflutter s/p ablation, and hypertension presented to the ED after tripping and falling which resulted in a complete and displaced fracture of right femur.     # R Periprosthetic Femur Fx  - s/p R ORIF femur 03/21   - WBAT RLE; OOBTC per Ortho  - PT/Physiatry on board- plan for AR     #HFpEF not in exacerbation  #Aflutter s/p Ablation  - EKG demonstrating NSR  - HF team following, will give IV lasix with prbc transfusion   - 3/23 BP borderline - held home metoprolol 12.5 mg QD > Resume by 3/25 if BP tolerates.   - On PO Lasix 2 times per week/3 times per week alternating - Holding d/t Hyponatremia.   - s/p 3d IV Venofer     #Acute on chronic normocytic anemia   #Suspected Occult GIB   - s/p 2u PRBC with lasix 10 IV   - Iron = 26 // TIBC WNL // %Fe sat = 6  - Ferritin, folate and B12 WNL, MCV 90  - Had a previous colonoscopy ~ 7 years ago however cannot accurately recall results  - Patient was scheduled for capsule endoscopy next week by Dr. Paulino  - GI following: plan for egd/colonoscopy next week     # Hyponatremia- improving   - patient is euvolemic on exam. will encourage increased PO intake and monitor for now    # Hypomagnesia  replete prn    # DLD  - c/w Statin     # h/o Tonsillar Cancer  - s/p surgical resection of mass from soft palate, in future ensure anesthesia is aware pt has obturator covering hole in post oropharynx prior to surgery    # DVT PPX: SCD, Lovenox       #Progress Note Handoff:  Pending (specify):  egd colono next week and then dc to rehab   Family discussion: d/w family at bedside   Disposition: Home___/SNF___/Other________/Unknown at this time__x______    Eva Neff,     Total time spent to complete patient's bedside assessment, review medical chart, discuss medical plan of care with covering medical team was more than 25 minutes  with >50% of time spent face to face with patient, discussion with patient/family and/or coordination of care .

## 2022-03-27 LAB
ALBUMIN SERPL ELPH-MCNC: 2.4 G/DL — LOW (ref 3.5–5.2)
ALP SERPL-CCNC: 130 U/L — HIGH (ref 30–115)
ALT FLD-CCNC: 20 U/L — SIGNIFICANT CHANGE UP (ref 0–41)
ANION GAP SERPL CALC-SCNC: 8 MMOL/L — SIGNIFICANT CHANGE UP (ref 7–14)
AST SERPL-CCNC: 35 U/L — SIGNIFICANT CHANGE UP (ref 0–41)
BILIRUB SERPL-MCNC: 1.1 MG/DL — SIGNIFICANT CHANGE UP (ref 0.2–1.2)
BUN SERPL-MCNC: 25 MG/DL — HIGH (ref 10–20)
CALCIUM SERPL-MCNC: 7.7 MG/DL — LOW (ref 8.5–10.1)
CHLORIDE SERPL-SCNC: 95 MMOL/L — LOW (ref 98–110)
CO2 SERPL-SCNC: 24 MMOL/L — SIGNIFICANT CHANGE UP (ref 17–32)
CREAT ?TM UR-MCNC: 51 MG/DL — SIGNIFICANT CHANGE UP
CREAT SERPL-MCNC: 0.6 MG/DL — LOW (ref 0.7–1.5)
EGFR: 87 ML/MIN/1.73M2 — SIGNIFICANT CHANGE UP
GLUCOSE SERPL-MCNC: 106 MG/DL — HIGH (ref 70–99)
HCT VFR BLD CALC: 27.9 % — LOW (ref 37–47)
HGB BLD-MCNC: 9.4 G/DL — LOW (ref 12–16)
MCHC RBC-ENTMCNC: 30 PG — SIGNIFICANT CHANGE UP (ref 27–31)
MCHC RBC-ENTMCNC: 33.7 G/DL — SIGNIFICANT CHANGE UP (ref 32–37)
MCV RBC AUTO: 89.1 FL — SIGNIFICANT CHANGE UP (ref 81–99)
NRBC # BLD: 0 /100 WBCS — SIGNIFICANT CHANGE UP (ref 0–0)
NT-PROBNP SERPL-SCNC: 617 PG/ML — HIGH (ref 0–300)
OSMOLALITY UR: 439 MOS/KG — SIGNIFICANT CHANGE UP (ref 50–1200)
PLATELET # BLD AUTO: 204 K/UL — SIGNIFICANT CHANGE UP (ref 130–400)
POTASSIUM SERPL-MCNC: 4.6 MMOL/L — SIGNIFICANT CHANGE UP (ref 3.5–5)
POTASSIUM SERPL-SCNC: 4.6 MMOL/L — SIGNIFICANT CHANGE UP (ref 3.5–5)
PROT SERPL-MCNC: 4.5 G/DL — LOW (ref 6–8)
RBC # BLD: 3.13 M/UL — LOW (ref 4.2–5.4)
RBC # FLD: 21.2 % — HIGH (ref 11.5–14.5)
SODIUM SERPL-SCNC: 127 MMOL/L — LOW (ref 135–146)
SODIUM UR-SCNC: <20 MMOL/L — SIGNIFICANT CHANGE UP
UUN UR-MCNC: 838 MG/DL — SIGNIFICANT CHANGE UP
WBC # BLD: 10.27 K/UL — SIGNIFICANT CHANGE UP (ref 4.8–10.8)
WBC # FLD AUTO: 10.27 K/UL — SIGNIFICANT CHANGE UP (ref 4.8–10.8)

## 2022-03-27 PROCEDURE — 71045 X-RAY EXAM CHEST 1 VIEW: CPT | Mod: 26

## 2022-03-27 PROCEDURE — 99233 SBSQ HOSP IP/OBS HIGH 50: CPT

## 2022-03-27 RX ORDER — METOPROLOL TARTRATE 50 MG
12.5 TABLET ORAL DAILY
Refills: 0 | Status: DISCONTINUED | OUTPATIENT
Start: 2022-03-27 | End: 2022-03-31

## 2022-03-27 RX ORDER — METOPROLOL TARTRATE 50 MG
12.5 TABLET ORAL DAILY
Refills: 0 | Status: DISCONTINUED | OUTPATIENT
Start: 2022-03-27 | End: 2022-03-27

## 2022-03-27 RX ORDER — METOPROLOL TARTRATE 50 MG
25 TABLET ORAL DAILY
Refills: 0 | Status: DISCONTINUED | OUTPATIENT
Start: 2022-03-27 | End: 2022-03-27

## 2022-03-27 RX ADMIN — Medication 2000 UNIT(S): at 11:50

## 2022-03-27 RX ADMIN — Medication 1 DROP(S): at 11:50

## 2022-03-27 RX ADMIN — SIMVASTATIN 20 MILLIGRAM(S): 20 TABLET, FILM COATED ORAL at 22:11

## 2022-03-27 RX ADMIN — PANTOPRAZOLE SODIUM 40 MILLIGRAM(S): 20 TABLET, DELAYED RELEASE ORAL at 06:15

## 2022-03-27 RX ADMIN — SENNA PLUS 2 TABLET(S): 8.6 TABLET ORAL at 22:11

## 2022-03-27 RX ADMIN — ENOXAPARIN SODIUM 40 MILLIGRAM(S): 100 INJECTION SUBCUTANEOUS at 22:11

## 2022-03-27 NOTE — SWALLOW BEDSIDE ASSESSMENT ADULT - NS ASR SWALLOW FINDINGS DISCUS
Pt's son Mahesh, RN Rehan/Physician/Nursing/Patient/Family Pt's son Mahesh, RN Rehan, Dr. Kelly/Physician/Nursing/Patient/Family

## 2022-03-27 NOTE — PROGRESS NOTE ADULT - SUBJECTIVE AND OBJECTIVE BOX
LIZA HERNANDEZ  87y, Female  Allergy: No Known Allergies    Hospital Day: 11d    Patient seen and examined earlier today. Feeling well, no complaints. Advised to keep LE elevated due to edema.     PMH/PSH:  PAST MEDICAL & SURGICAL HISTORY:  High cholesterol    Kidney stone    Bladder polyp    Shingles  affecting right eye    Atrial flutter    H/O cystoscopy    H/O total knee replacement, right    H/O total knee replacement, left    Cancer of mouth    History of cardioversion        LAST 24-Hr EVENTS:    VITALS:  T(F): 97.7 (03-27-22 @ 05:49), Max: 98.4 (03-26-22 @ 21:46)  HR: 84 (03-27-22 @ 13:30)  BP: 117/57 (03-27-22 @ 13:30) (112/64 - 137/60)  RR: 20 (03-27-22 @ 13:30)  SpO2: 95% (03-27-22 @ 13:30)        TESTS & MEASUREMENTS:  Weight (Kg):       03-25-22 @ 07:01  -  03-26-22 @ 07:00  --------------------------------------------------------  IN: 0 mL / OUT: 800 mL / NET: -800 mL    03-26-22 @ 07:01  -  03-27-22 @ 07:00  --------------------------------------------------------  IN: 0 mL / OUT: 900 mL / NET: -900 mL    03-27-22 @ 07:01  -  03-27-22 @ 15:58  --------------------------------------------------------  IN: 555 mL / OUT: 500 mL / NET: 55 mL                            9.4    10.27 )-----------( 204      ( 27 Mar 2022 04:30 )             27.9       03-27    127<L>  |  95<L>  |  25<H>  ----------------------------<  106<H>  4.6   |  24  |  0.6<L>    Ca    7.7<L>      27 Mar 2022 04:30  Mg     2.0     03-26    TPro  4.5<L>  /  Alb  2.4<L>  /  TBili  1.1  /  DBili  x   /  AST  35  /  ALT  20  /  AlkPhos  130<H>  03-27    LIVER FUNCTIONS - ( 27 Mar 2022 04:30 )  Alb: 2.4 g/dL / Pro: 4.5 g/dL / ALK PHOS: 130 U/L / ALT: 20 U/L / AST: 35 U/L / GGT: x                               RADIOLOGY, ECG, & ADDITIONAL TESTS:      RECENT DIAGNOSTIC ORDERS:  Serum Pro-Brain Natriuretic Peptide: 16:00 (03-27-22 @ 13:09)  Diet, Regular:   1500mL Fluid Restriction ([id number])     Qty per Day:  MAGIC CUP 2X Per Day     Qty per Day:  Chocolate     Qty per Day:  Chocolate Ensure  Supplement Feeding Modality:  Oral  Ensure Enlive Cans or Servings Per Day:  3       Frequency:  Daily (03-27-22 @ 13:05)  Creatinine, Random Urine: Routine (03-27-22 @ 09:37)  Urea Nitrogen,  Random Urine: Routine (03-27-22 @ 09:37)  Osmolality, Random Urine: Routine (03-27-22 @ 09:37)  Sodium, Random Urine: Routine (03-27-22 @ 09:37)      MEDICATIONS:  MEDICATIONS  (STANDING):  cholecalciferol 2000 Unit(s) Oral daily  enoxaparin Injectable 40 milliGRAM(s) SubCutaneous every 24 hours  HYDROmorphone PCA (1 mG/mL) 30 milliLiter(s) PCA Continuous PCA Continuous  pantoprazole    Tablet 40 milliGRAM(s) Oral before breakfast  prednisoLONE acetate 1% Suspension 1 Drop(s) Right EYE daily  senna 2 Tablet(s) Oral at bedtime  simvastatin 20 milliGRAM(s) Oral at bedtime    MEDICATIONS  (PRN):  acetaminophen     Tablet .. 650 milliGRAM(s) Oral every 6 hours PRN Moderate Pain (4 - 6), Severe Pain (7 - 10)  morphine  - Injectable 2 milliGRAM(s) IV Push every 4 hours PRN Moderate Pain (4 - 6)  naloxone Injectable 0.1 milliGRAM(s) IV Push every 3 minutes PRN For ANY of the following changes in patient status:  A. RR LESS THAN 10 breaths per minute, B. Oxygen saturation LESS THAN 90%, C. Sedation score of 6  ondansetron Injectable 4 milliGRAM(s) IV Push every 6 hours PRN Nausea      HOME MEDICATIONS:  Lasix 20 mg oral tablet (03-16)  metoprolol succinate 25 mg oral tablet, extended release (03-16)  prednisoLONE acetate 1% ophthalmic suspension (03-16)  Vitamin D2 50 mcg (2000 intl units) oral capsule (03-16)      PHYSICAL EXAM:  GENERAL: resting in bed comfortably  HNENT: NCAT MMM   PULMONARY: Clear to auscultation bilaterally. No rales, rhonchi, or wheezing.  CARDIOVASCULAR: Regular rate and rhythm, S1-S2, no murmurs  GASTROINTESTINAL: Soft, non-tender, non-distended, no guarding.  MSK: Warm well perfused, 2+ LE edema   Neuro: AAOx3, moving extremities appropriately

## 2022-03-27 NOTE — SWALLOW BEDSIDE ASSESSMENT ADULT - SLP PERTINENT HISTORY OF CURRENT PROBLEM
87 y.o F PMH of dyslipidemia, renal stone, shingles, HFpEF, GI bleeds, Aflutter s/p ablation, and hypertension  and squamous cell carninoma of tonsils in 1997. Pt has surgical resection and obturator placed. presented to the ED after tripping and falling which resulted in a complete and displaced fracture of right femur. Pt s/p R ORIF R femur  3/21/22. 87 y.o F PMH of dyslipidemia, renal stone, shingles, HFpEF, GI bleeds, Aflutter s/p ablation, and hypertension  and squamous cell carninoma of tonsils in 1997. Pt has surgical resection and obturator placed. presented to the ED after tripping and falling which resulted in a complete and displaced fracture of right femur. Pt s/p R ORIF R femur  3/21/22. CXR 3/25 No radiographic evidence of acute cardiopulmonary disease

## 2022-03-27 NOTE — PROGRESS NOTE ADULT - ASSESSMENT
88 y/o F a PMH of dyslipidemia, renal stone, shingles, HFpEF, GI bleeds, Aflutter s/p ablation, and hypertension presented to the ED after tripping and falling which resulted in a complete and displaced fracture of right femur.     # R Periprosthetic Femur Fx  - s/p R ORIF femur 03/21   - WBAT RLE; OOBTC per Ortho  - PT/Physiatry on board- plan for AR     #HFpEF not in exacerbation  #Aflutter s/p Ablation  - EKG demonstrating NSR  - HF team following, s/p IV lasix with prbc transfusion   - restart home metoprolol   - On PO Lasix 2 times per week/3 times per week alternating - Holding d/t Hyponatremia.   - s/p 3d IV Venofer     #Acute on chronic normocytic anemia   #Suspected Occult GIB   - s/p 2u PRBC with lasix 10 IV   - Iron = 26 // TIBC WNL // %Fe sat = 6  - Ferritin, folate and B12 WNL, MCV 90  - Had a previous colonoscopy ~ 7 years ago however cannot accurately recall results  - Patient was scheduled for capsule endoscopy next week by Dr. Paulino  - GI following: plan for egd/colonoscopy next week - likely tuesday     # Hypovolemic Hyponatremia- resolved w/ IVF   # Euvolemic Hyponatremia   - pt with lower extremity edema, but on RA no infiltrates on CXR  - per IVC exam by Dr Whiteside pt is euvolemic  - Na previously improved with IVF when pt was anemic/hypovolemic   - now again with worsening Na, suspect euvolemic hyponatremia, poss SIADH vs poor solute intake   - will resend urine Na, Osm   - encourage free water restriction, change diet to regular to optimize solute intake   - if no improvement by tomorrow will consult nephrology     # Hypomagnesia  replete prn    # DLD  - c/w Statin     # h/o Tonsillar Cancer  - s/p surgical resection of mass from soft palate, in future ensure anesthesia is aware pt has obturator covering hole in post oropharynx prior to surgery    # DVT PPX: SCD, Lovenox       #Progress Note Handoff:  Pending (specify):  egd colono next week and then dc to rehab   Family discussion: d/w family at bedside   Disposition: Home___/SNF___/Other________/Unknown at this time__x______    Eva Neff DO    Total time spent to complete patient's bedside assessment, review medical chart, discuss medical plan of care with covering medical team was more than 35 minutes  with >50% of time spent face to face with patient, discussion with patient/family and/or coordination of care .

## 2022-03-28 LAB
ALBUMIN SERPL ELPH-MCNC: 2.4 G/DL — LOW (ref 3.5–5.2)
ALP SERPL-CCNC: 140 U/L — HIGH (ref 30–115)
ALT FLD-CCNC: 24 U/L — SIGNIFICANT CHANGE UP (ref 0–41)
ANION GAP SERPL CALC-SCNC: 6 MMOL/L — LOW (ref 7–14)
AST SERPL-CCNC: 36 U/L — SIGNIFICANT CHANGE UP (ref 0–41)
BILIRUB SERPL-MCNC: 1.3 MG/DL — HIGH (ref 0.2–1.2)
BUN SERPL-MCNC: 24 MG/DL — HIGH (ref 10–20)
CALCIUM SERPL-MCNC: 8.1 MG/DL — LOW (ref 8.5–10.1)
CHLORIDE SERPL-SCNC: 98 MMOL/L — SIGNIFICANT CHANGE UP (ref 98–110)
CO2 SERPL-SCNC: 27 MMOL/L — SIGNIFICANT CHANGE UP (ref 17–32)
CREAT SERPL-MCNC: 0.6 MG/DL — LOW (ref 0.7–1.5)
EGFR: 87 ML/MIN/1.73M2 — SIGNIFICANT CHANGE UP
GLUCOSE SERPL-MCNC: 102 MG/DL — HIGH (ref 70–99)
HCT VFR BLD CALC: 29.4 % — LOW (ref 37–47)
HGB BLD-MCNC: 9.7 G/DL — LOW (ref 12–16)
MCHC RBC-ENTMCNC: 29.7 PG — SIGNIFICANT CHANGE UP (ref 27–31)
MCHC RBC-ENTMCNC: 33 G/DL — SIGNIFICANT CHANGE UP (ref 32–37)
MCV RBC AUTO: 89.9 FL — SIGNIFICANT CHANGE UP (ref 81–99)
NRBC # BLD: 0 /100 WBCS — SIGNIFICANT CHANGE UP (ref 0–0)
PLATELET # BLD AUTO: 222 K/UL — SIGNIFICANT CHANGE UP (ref 130–400)
POTASSIUM SERPL-MCNC: 5.1 MMOL/L — HIGH (ref 3.5–5)
POTASSIUM SERPL-SCNC: 5.1 MMOL/L — HIGH (ref 3.5–5)
PROT SERPL-MCNC: 4.7 G/DL — LOW (ref 6–8)
RBC # BLD: 3.27 M/UL — LOW (ref 4.2–5.4)
RBC # FLD: 21.2 % — HIGH (ref 11.5–14.5)
SODIUM SERPL-SCNC: 131 MMOL/L — LOW (ref 135–146)
WBC # BLD: 8.52 K/UL — SIGNIFICANT CHANGE UP (ref 4.8–10.8)
WBC # FLD AUTO: 8.52 K/UL — SIGNIFICANT CHANGE UP (ref 4.8–10.8)

## 2022-03-28 PROCEDURE — 99233 SBSQ HOSP IP/OBS HIGH 50: CPT

## 2022-03-28 PROCEDURE — 93970 EXTREMITY STUDY: CPT | Mod: 26

## 2022-03-28 PROCEDURE — 99232 SBSQ HOSP IP/OBS MODERATE 35: CPT

## 2022-03-28 RX ORDER — POLYETHYLENE GLYCOL 3350 17 G/17G
17 POWDER, FOR SOLUTION ORAL DAILY
Refills: 0 | Status: DISCONTINUED | OUTPATIENT
Start: 2022-03-28 | End: 2022-03-31

## 2022-03-28 RX ORDER — PANTOPRAZOLE SODIUM 20 MG/1
40 TABLET, DELAYED RELEASE ORAL
Refills: 0 | Status: DISCONTINUED | OUTPATIENT
Start: 2022-03-28 | End: 2022-03-31

## 2022-03-28 RX ADMIN — Medication 2000 UNIT(S): at 12:24

## 2022-03-28 RX ADMIN — PANTOPRAZOLE SODIUM 40 MILLIGRAM(S): 20 TABLET, DELAYED RELEASE ORAL at 06:17

## 2022-03-28 RX ADMIN — Medication 12.5 MILLIGRAM(S): at 06:17

## 2022-03-28 RX ADMIN — SIMVASTATIN 20 MILLIGRAM(S): 20 TABLET, FILM COATED ORAL at 21:30

## 2022-03-28 RX ADMIN — SENNA PLUS 2 TABLET(S): 8.6 TABLET ORAL at 21:30

## 2022-03-28 RX ADMIN — PANTOPRAZOLE SODIUM 40 MILLIGRAM(S): 20 TABLET, DELAYED RELEASE ORAL at 17:47

## 2022-03-28 RX ADMIN — Medication 1 DROP(S): at 12:29

## 2022-03-28 NOTE — CONSULT NOTE ADULT - CONSULT REQUESTED DATE/TIME
18-Mar-2022 16:41
23-Mar-2022 12:16
31-Mar-2022 10:30
28-Mar-2022 15:33
17-Mar-2022 09:42
16-Mar-2022 15:00
16-Mar-2022 15:43

## 2022-03-28 NOTE — PROGRESS NOTE ADULT - ASSESSMENT
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 Patient is being evaluated today due to her acute Hgb drop 7.5 -> 6.7.    #Normocytic Anemia - component of iron deficiency - NO OVERT Bleeding  - Hx of anemia since January 2021, where Hgb found to be 6.5 - s/p 3u pRBC and iron infusions outpatient. Presumed GIB, followed outpatient w/ Dr Paulino and Elmer. Last colonoscopy ~7 years ago w/ normal results per patient. Never had EGD. Patient was put on Eliquis May 2021 for Afib, last taken January 2022. Patient scheduled for capsule endoscopy next week, but canceled due to her recent admission.   - Patient admitted on 3/16 due to fall, s/p R femur open reduction w/ internal fixation on 3/21. Patient has received a total of 3u pRBC during her hospital course. Low Hgb presumed due to her recent procedure.  - Hgb drop today 7.5 -> 6.5. Scheduled to receive 1u pRBC today.  - INR wnl, Hgb 6.7( 7.5), HCT 20.8 (<23.6)  - No evidence of gross bleeding. Pt denies lightheadedness, N/V/D, change in bowel habits, bloody or dark stools.      #Recs  - Pt H&H remained stable and no overt bleeding  - pt can follow up with DR. Paulino as ouaptient for further endoscopic workup. no need for EGD/CF as inpatient  - can resume anticoagulation if warranted   - Target Hgb >7  - protonix 40 bid

## 2022-03-28 NOTE — PROGRESS NOTE ADULT - SUBJECTIVE AND OBJECTIVE BOX
LIZA HERNANDEZ  87y, Female  Allergy: No Known Allergies    Hospital Day: 12d    Patient seen and examined earlier today. Feeling well, no complaints.     PMH/PSH:  PAST MEDICAL & SURGICAL HISTORY:  High cholesterol    Kidney stone    Bladder polyp    Shingles  affecting right eye    Atrial flutter    H/O cystoscopy    H/O total knee replacement, right    H/O total knee replacement, left    Cancer of mouth    History of cardioversion        LAST 24-Hr EVENTS:    VITALS:  T(F): 96.1 (03-28-22 @ 14:05), Max: 97.8 (03-28-22 @ 05:37)  HR: 72 (03-28-22 @ 14:05)  BP: 122/58 (03-28-22 @ 14:05) (113/58 - 132/63)  RR: 18 (03-28-22 @ 14:05)  SpO2: --        TESTS & MEASUREMENTS:  Weight (Kg):       03-26-22 @ 07:01  -  03-27-22 @ 07:00  --------------------------------------------------------  IN: 0 mL / OUT: 900 mL / NET: -900 mL    03-27-22 @ 07:01  -  03-28-22 @ 07:00  --------------------------------------------------------  IN: 1261 mL / OUT: 1300 mL / NET: -39 mL    03-28-22 @ 07:01  -  03-28-22 @ 18:01  --------------------------------------------------------  IN: 680 mL / OUT: 200 mL / NET: 480 mL                            9.7    8.52  )-----------( 222      ( 28 Mar 2022 04:30 )             29.4       03-28    131<L>  |  98  |  24<H>  ----------------------------<  102<H>  5.1<H>   |  27  |  0.6<L>    Ca    8.1<L>      28 Mar 2022 04:30    TPro  4.7<L>  /  Alb  2.4<L>  /  TBili  1.3<H>  /  DBili  x   /  AST  36  /  ALT  24  /  AlkPhos  140<H>  03-28    LIVER FUNCTIONS - ( 28 Mar 2022 04:30 )  Alb: 2.4 g/dL / Pro: 4.7 g/dL / ALK PHOS: 140 U/L / ALT: 24 U/L / AST: 36 U/L / GGT: x                         Serum Pro-Brain Natriuretic Peptide: 617 pg/mL (03-27-22 @ 16:52)        RADIOLOGY, ECG, & ADDITIONAL TESTS:      RECENT DIAGNOSTIC ORDERS:  COVID-19 PCR: AM Sched. Collection: 29-Mar-2022 04:30  Specimen Source: Nasopharyngeal (03-28-22 @ 14:10)  VA Duplex Lower Ext Vein Scan, Bilat: Routine   Indication: LE edema  Transport: Stretcher-Crib (03-28-22 @ 13:09)  Diet, Clear Liquid (03-28-22 @ 10:52)      MEDICATIONS:  MEDICATIONS  (STANDING):  cholecalciferol 2000 Unit(s) Oral daily  enoxaparin Injectable 40 milliGRAM(s) SubCutaneous every 24 hours  HYDROmorphone PCA (1 mG/mL) 30 milliLiter(s) PCA Continuous PCA Continuous  metoprolol succinate ER 12.5 milliGRAM(s) Oral daily  pantoprazole    Tablet 40 milliGRAM(s) Oral two times a day  polyethylene glycol 3350 17 Gram(s) Oral daily  prednisoLONE acetate 1% Suspension 1 Drop(s) Right EYE daily  senna 2 Tablet(s) Oral at bedtime  simvastatin 20 milliGRAM(s) Oral at bedtime    MEDICATIONS  (PRN):  acetaminophen     Tablet .. 650 milliGRAM(s) Oral every 6 hours PRN Moderate Pain (4 - 6), Severe Pain (7 - 10)  morphine  - Injectable 2 milliGRAM(s) IV Push every 4 hours PRN Moderate Pain (4 - 6)  naloxone Injectable 0.1 milliGRAM(s) IV Push every 3 minutes PRN For ANY of the following changes in patient status:  A. RR LESS THAN 10 breaths per minute, B. Oxygen saturation LESS THAN 90%, C. Sedation score of 6  ondansetron Injectable 4 milliGRAM(s) IV Push every 6 hours PRN Nausea      HOME MEDICATIONS:  Lasix 20 mg oral tablet (03-16)  metoprolol succinate 25 mg oral tablet, extended release (03-16)  prednisoLONE acetate 1% ophthalmic suspension (03-16)  Vitamin D2 50 mcg (2000 intl units) oral capsule (03-16)      PHYSICAL EXAM:  GENERAL: resting in bed comfortably  HNENT: NCAT MMM   PULMONARY: Clear to auscultation bilaterally. No rales, rhonchi, or wheezing.  CARDIOVASCULAR: Regular rate and rhythm, S1-S2, no murmurs  GASTROINTESTINAL: Soft, non-tender, non-distended, no guarding.  MSK: Warm well perfused, 2+ LE edema   Neuro: AAOx3, moving extremities appropriately

## 2022-03-28 NOTE — CONSULT NOTE ADULT - SUBJECTIVE AND OBJECTIVE BOX
NEPHROLOGY CONSULTATION NOTE    LIZA HERNANDEZ  87y  Female  MRN-[id number]    CC:   Patient is a 87y old  Female who presents with a chief complaint of Displaced fracture of R femur 2/2 to Fall (25 Mar 2022 14:19)      HPI:  HPI: 86 F PMH HFpEF, Afib on Eliquis, Anemia suspected to be secondary to GIB ( but never proven s/p neg EGD and C-scope), HTN, and HLD, hx of tonsillar ca s/p resection w/obturator in placeto cover hole in pallate, BIBEMS s/p mechanical fall at home. Pt lives with her daughter and went to her old home to collect some documents and tripped in the doorway and landed on her right knee. Had immediate pain, unable to ambulate afterwards. Pt normally ambulates w/ a cane. Pt underwent left TKA by Dr. Morton in  and a right TKA by Dr. Perkins at [facility] approx 8 years ago. right PATRICK Type II periprosthetic distal femur fracture. Pt placed in a knee immobilizer.     ED COURSE: As above, HD stable (16 Mar 2022 20:55)      PAST MEDICAL & SURGICAL HISTORY:  High cholesterol    Kidney stone    Bladder polyp    Shingles  affecting right eye    Atrial flutter    H/O cystoscopy    H/O total knee replacement, right    H/O total knee replacement, left    Cancer of mouth    History of cardioversion      Allergies:  No Known Allergies    Home Medications Reviewed  Hospital Medications:   MEDICATIONS  (STANDING):  cholecalciferol 2000 Unit(s) Oral daily  enoxaparin Injectable 40 milliGRAM(s) SubCutaneous every 24 hours  HYDROmorphone PCA (1 mG/mL) 30 milliLiter(s) PCA Continuous PCA Continuous  metoprolol succinate ER 12.5 milliGRAM(s) Oral daily  pantoprazole    Tablet 40 milliGRAM(s) Oral before breakfast  polyethylene glycol 3350 17 Gram(s) Oral daily  prednisoLONE acetate 1% Suspension 1 Drop(s) Right EYE daily  senna 2 Tablet(s) Oral at bedtime  simvastatin 20 milliGRAM(s) Oral at bedtime    MEDICATIONS  (PRN):  acetaminophen     Tablet .. 650 milliGRAM(s) Oral every 6 hours PRN Moderate Pain (4 - 6), Severe Pain (7 - 10)  morphine  - Injectable 2 milliGRAM(s) IV Push every 4 hours PRN Moderate Pain (4 - 6)  naloxone Injectable 0.1 milliGRAM(s) IV Push every 3 minutes PRN For ANY of the following changes in patient status:  A. RR LESS THAN 10 breaths per minute, B. Oxygen saturation LESS THAN 90%, C. Sedation score of 6  ondansetron Injectable 4 milliGRAM(s) IV Push every 6 hours PRN Nausea    Home Medications:  Lasix 20 mg oral tablet: 1 tab(s) orally 2 times a week then the following week take it 3 times per week (16 Mar 2022 21:45)  metoprolol succinate 25 mg oral tablet, extended release: 0.5 tab(s) orally once a day (16 Mar 2022 21:45)  prednisoLONE acetate 1% ophthalmic suspension: 1 drop(s) in the right eye once a day (16 Mar 2022 21:45)  Vitamin D2 50 mcg (2000 intl units) oral capsule: 1 cap(s) orally once a day (16 Mar 2022 21:45)      SOCIAL HISTORY:  Social History:      FAMILY HISTORY:      REVIEW OF SYSTEMS:  All other review of systems is negative unless indicated above.    VITALS:  T(F): 96.1 (22 @ 14:05), Max: 97.8 (22 @ 05:37)  HR: 72 (22 @ 14:05)  BP: 122/58 (22 @ 14:05)  RR: 18 (22 @ 14:05)  SpO2: 98% (22 @ 17:44)      I&O's Detail    27 Mar 2022 07:  -  28 Mar 2022 07:00  --------------------------------------------------------  IN:    Oral Fluid: 1261 mL  Total IN: 1261 mL    OUT:    Indwelling Catheter - Urethral (mL): 900 mL    Voided (mL): 400 mL  Total OUT: 1300 mL    Total NET: -39 mL      28 Mar 2022 07:01  -  28 Mar 2022 15:33  --------------------------------------------------------  IN:    Oral Fluid: 680 mL  Total IN: 680 mL    OUT:    Indwelling Catheter - Urethral (mL): 200 mL  Total OUT: 200 mL    Total NET: 480 mL            I&O's Summary    27 Mar 2022 07:  -  28 Mar 2022 07:00  --------------------------------------------------------  IN: 1261 mL / OUT: 1300 mL / NET: -39 mL    28 Mar 2022 07:  -  28 Mar 2022 15:33  --------------------------------------------------------  IN: 680 mL / OUT: 200 mL / NET: 480 mL        PHYSICAL EXAM:  Gen: NAD  resp: CTAB  card: S1/S2  abd: soft  ext: no edema    LABS:          131<L>  |  98  |  24<H>  ----------------------------<  102<H>  5.1<H>   |  27  |  0.6<L>    Ca    8.1<L>      28 Mar 2022 04:30    TPro  4.7<L>  /  Alb  2.4<L>  /  TBili  1.3<H>  /  DBili      /  AST  36  /  ALT  24  /  AlkPhos  140<H>        Sodium, Serum: 131 mmol/L (22 @ 04:30)  Sodium, Serum: 127 mmol/L (22 @ 04:30)  Sodium, Serum: 130 mmol/L (22 @ 04:30)  Sodium, Serum: 129 mmol/L (22 @ 22:16)  Sodium, Serum: 128 mmol/L (22 @ 17:23)  Sodium, Serum: 131 mmol/L (22 @ 06:40)  Sodium, Serum: 126 mmol/L (22 @ 06:15)  Sodium, Serum: 123 mmol/L (22 @ 10:31)  Sodium, Serum: 121 mmol/L (22 @ 08:00)  Sodium, Serum: 128 mmol/L (22 @ 07:35)    Osmolality, Random Urine: 439 mos/kg [50 - 1200] (22 @ 17:13)  Osmolality, Random Urine: 156 mos/kg [50 - 1200] (22 @ 17:09)  Osmolality, Random Urine: 368 mos/kg [50 - 1200] (22 @ 09:50)    Sodium, Random Urine: <20.0 mmoL/L (22 @ 17:13)  Sodium, Random Urine: 20.0 mmoL/L (22 @ 17:09)  Sodium, Random Urine: <20.0 mmoL/L (22 @ 09:50)    Osmolality, Serum: 264 mos/kg [280 - 301] (22 @ 10:29)    Thyroid Stimulating Hormone, Serum: 1.13 uIU/mL [0.27 - 4.20] (22 @ 04:30)  Thyroid Stimulating Hormone, Serum: 1.72 uIU/mL [0.27 - 4.20] (22 @ 19:00)  Thyroid Stimulating Hormone, Serum: 1.49 uIU/mL [0.27 - 4.20] (21 @ 05:46)          Creatinine Trend:   Creatinine, Serum: 0.6 mg/dL (22 @ 04:30)  Creatinine, Serum: 0.6 mg/dL (22 @ 04:30)  Creatinine, Serum: 0.7 mg/dL (22 @ 04:30)  Creatinine, Serum: 0.8 mg/dL (22 @ 22:16)  Creatinine, Serum: 0.7 mg/dL (22 @ 17:23)                            9.7    8.52  )-----------( 222      ( 28 Mar 2022 04:30 )             29.4     Mean Cell Volume: 89.9 fL (22 @ 04:30)    Urine Studies:  Urinalysis Basic - ( 24 Mar 2022 20:28 )    Color: Light Yellow / Appearance: Clear / S.008 / pH:   Gluc:  / Ketone: Negative  / Bili: Negative / Urobili: <2 mg/dL   Blood:  / Protein: Negative / Nitrite: Negative   Leuk Esterase: Moderate / RBC: 2 /HPF / WBC 4 /HPF   Sq Epi:  / Non Sq Epi: 0 /HPF / Bacteria: 0.0      Sodium, Random Urine: <20.0 mmoL/L ( @ 17:13)  Osmolality, Random Urine: 439 mos/kg ( @ 17:13)            RADIOLOGY & ADDITIONAL STUDIES:        Xray Chest 1 View- PORTABLE-Urgent:   ACC: [id number] EXAM:  XR CHEST PORTABLE URGENT 1V                          PROCEDURE DATE:  2022          INTERPRETATION:  Clinical History / Reason for exam: Heart failure    Comparison : Chest radiograph 2022.    Technique/Positioning: Single AP view of the chest.    Findings:    Support devices: None.    Cardiac/mediastinum/hilum: Unchanged    Lung parenchyma/Pleura: Within normal limits.    Skeleton/soft tissues: Unchanged.    Impression:    No radiographic evidence of acute cardiopulmonary disease.        --- End of Report ---            ELLIOT LANDAU MD; Attending Radiologist  This document has been electronically signed. Mar 27 2022  2:28PM (22 @ 13:53)                     NEPHROLOGY CONSULTATION NOTE    LIZA HERNANDEZ  87y  Female  MRN-[id number]    CC:   Patient is a 87y old  Female who presents with a chief complaint of Displaced fracture of R femur 2/2 to Fall (25 Mar 2022 14:19)      HPI:  HPI: 86 F PMH HFpEF, Afib on Eliquis, Anemia suspected to be secondary to GIB ( but never proven s/p neg EGD and C-scope), HTN, and HLD, hx of tonsillar ca s/p resection w/obturator in placeto cover hole in pallate, BIBEMS s/p mechanical fall at home. Pt lives with her daughter and went to her old home to collect some documents and tripped in the doorway and landed on her right knee. Had immediate pain, unable to ambulate afterwards. Pt normally ambulates w/ a cane. Pt underwent left TKA by Dr. Morton in  and a right TKA by Dr. Perkins at [facility] approx 8 years ago. right PATRICK Type II periprosthetic distal femur fracture. Pt placed in a knee immobilizer.     ED COURSE: As above, HD stable (16 Mar 2022 20:55)      PAST MEDICAL & SURGICAL HISTORY:  High cholesterol    Kidney stone    Bladder polyp    Shingles  affecting right eye    Atrial flutter    H/O cystoscopy    H/O total knee replacement, right    H/O total knee replacement, left    Cancer of mouth    History of cardioversion      Allergies:  No Known Allergies    Home Medications Reviewed  Hospital Medications:   MEDICATIONS  (STANDING):  cholecalciferol 2000 Unit(s) Oral daily  enoxaparin Injectable 40 milliGRAM(s) SubCutaneous every 24 hours  HYDROmorphone PCA (1 mG/mL) 30 milliLiter(s) PCA Continuous PCA Continuous  metoprolol succinate ER 12.5 milliGRAM(s) Oral daily  pantoprazole    Tablet 40 milliGRAM(s) Oral before breakfast  polyethylene glycol 3350 17 Gram(s) Oral daily  prednisoLONE acetate 1% Suspension 1 Drop(s) Right EYE daily  senna 2 Tablet(s) Oral at bedtime  simvastatin 20 milliGRAM(s) Oral at bedtime    MEDICATIONS  (PRN):  acetaminophen     Tablet .. 650 milliGRAM(s) Oral every 6 hours PRN Moderate Pain (4 - 6), Severe Pain (7 - 10)  morphine  - Injectable 2 milliGRAM(s) IV Push every 4 hours PRN Moderate Pain (4 - 6)  naloxone Injectable 0.1 milliGRAM(s) IV Push every 3 minutes PRN For ANY of the following changes in patient status:  A. RR LESS THAN 10 breaths per minute, B. Oxygen saturation LESS THAN 90%, C. Sedation score of 6  ondansetron Injectable 4 milliGRAM(s) IV Push every 6 hours PRN Nausea    Home Medications:  Lasix 20 mg oral tablet: 1 tab(s) orally 2 times a week then the following week take it 3 times per week (16 Mar 2022 21:45)  metoprolol succinate 25 mg oral tablet, extended release: 0.5 tab(s) orally once a day (16 Mar 2022 21:45)  prednisoLONE acetate 1% ophthalmic suspension: 1 drop(s) in the right eye once a day (16 Mar 2022 21:45)  Vitamin D2 50 mcg (2000 intl units) oral capsule: 1 cap(s) orally once a day (16 Mar 2022 21:45)      SOCIAL HISTORY:  Social History:      FAMILY HISTORY:      REVIEW OF SYSTEMS:  All other review of systems is negative unless indicated above.    VITALS:  T(F): 96.1 (22 @ 14:05), Max: 97.8 (22 @ 05:37)  HR: 72 (22 @ 14:05)  BP: 122/58 (22 @ 14:05)  RR: 18 (22 @ 14:05)  SpO2: 98% (22 @ 17:44)      I&O's Detail    27 Mar 2022 07:  -  28 Mar 2022 07:00  --------------------------------------------------------  IN:    Oral Fluid: 1261 mL  Total IN: 1261 mL    OUT:    Indwelling Catheter - Urethral (mL): 900 mL    Voided (mL): 400 mL  Total OUT: 1300 mL    Total NET: -39 mL      28 Mar 2022 07:01  -  28 Mar 2022 15:33  --------------------------------------------------------  IN:    Oral Fluid: 680 mL  Total IN: 680 mL    OUT:    Indwelling Catheter - Urethral (mL): 200 mL  Total OUT: 200 mL    Total NET: 480 mL            I&O's Summary    27 Mar 2022 07:  -  28 Mar 2022 07:00  --------------------------------------------------------  IN: 1261 mL / OUT: 1300 mL / NET: -39 mL    28 Mar 2022 07:  -  28 Mar 2022 15:33  --------------------------------------------------------  IN: 680 mL / OUT: 200 mL / NET: 480 mL        PHYSICAL EXAM:  Gen: NAD  resp: CTAB  card: S1/S2  abd: soft  ext: +edema    LABS:          131<L>  |  98  |  24<H>  ----------------------------<  102<H>  5.1<H>   |  27  |  0.6<L>    Ca    8.1<L>      28 Mar 2022 04:30    TPro  4.7<L>  /  Alb  2.4<L>  /  TBili  1.3<H>  /  DBili      /  AST  36  /  ALT  24  /  AlkPhos  140<H>        Sodium, Serum: 131 mmol/L (22 @ 04:30)  Sodium, Serum: 127 mmol/L (22 @ 04:30)  Sodium, Serum: 130 mmol/L (22 @ 04:30)  Sodium, Serum: 129 mmol/L (22 @ 22:16)  Sodium, Serum: 128 mmol/L (22 @ 17:23)  Sodium, Serum: 131 mmol/L (22 @ 06:40)  Sodium, Serum: 126 mmol/L (22 @ 06:15)  Sodium, Serum: 123 mmol/L (22 @ 10:31)  Sodium, Serum: 121 mmol/L (22 @ 08:00)  Sodium, Serum: 128 mmol/L (22 @ 07:35)    Osmolality, Random Urine: 439 mos/kg [50 - 1200] (22 @ 17:13)  Osmolality, Random Urine: 156 mos/kg [50 - 1200] (22 @ 17:09)  Osmolality, Random Urine: 368 mos/kg [50 - 1200] (22 @ 09:50)    Sodium, Random Urine: <20.0 mmoL/L (22 @ 17:13)  Sodium, Random Urine: 20.0 mmoL/L (22 @ 17:09)  Sodium, Random Urine: <20.0 mmoL/L (22 @ 09:50)    Osmolality, Serum: 264 mos/kg [280 - 301] (22 @ 10:29)    Thyroid Stimulating Hormone, Serum: 1.13 uIU/mL [0.27 - 4.20] (22 @ 04:30)  Thyroid Stimulating Hormone, Serum: 1.72 uIU/mL [0.27 - 4.20] (22 @ 19:00)  Thyroid Stimulating Hormone, Serum: 1.49 uIU/mL [0.27 - 4.20] (21 @ 05:46)          Creatinine Trend:   Creatinine, Serum: 0.6 mg/dL (22 @ 04:30)  Creatinine, Serum: 0.6 mg/dL (22 @ 04:30)  Creatinine, Serum: 0.7 mg/dL (22 @ 04:30)  Creatinine, Serum: 0.8 mg/dL (22 @ 22:16)  Creatinine, Serum: 0.7 mg/dL (22 @ 17:23)                            9.7    8.52  )-----------( 222      ( 28 Mar 2022 04:30 )             29.4     Mean Cell Volume: 89.9 fL (22 @ 04:30)    Urine Studies:  Urinalysis Basic - ( 24 Mar 2022 20:28 )    Color: Light Yellow / Appearance: Clear / S.008 / pH:   Gluc:  / Ketone: Negative  / Bili: Negative / Urobili: <2 mg/dL   Blood:  / Protein: Negative / Nitrite: Negative   Leuk Esterase: Moderate / RBC: 2 /HPF / WBC 4 /HPF   Sq Epi:  / Non Sq Epi: 0 /HPF / Bacteria: 0.0      Sodium, Random Urine: <20.0 mmoL/L ( @ 17:13)  Osmolality, Random Urine: 439 mos/kg ( @ 17:13)            RADIOLOGY & ADDITIONAL STUDIES:        Xray Chest 1 View- PORTABLE-Urgent:   ACC: [id number] EXAM:  XR CHEST PORTABLE URGENT 1V                          PROCEDURE DATE:  2022          INTERPRETATION:  Clinical History / Reason for exam: Heart failure    Comparison : Chest radiograph 2022.    Technique/Positioning: Single AP view of the chest.    Findings:    Support devices: None.    Cardiac/mediastinum/hilum: Unchanged    Lung parenchyma/Pleura: Within normal limits.    Skeleton/soft tissues: Unchanged.    Impression:    No radiographic evidence of acute cardiopulmonary disease.        --- End of Report ---            ELLIOT LANDAU MD; Attending Radiologist  This document has been electronically signed. Mar 27 2022  2:28PM (22 @ 13:53)

## 2022-03-28 NOTE — CONSULT NOTE ADULT - ASSESSMENT
# Hypoosmolar hyponatremia / high urine osm, low urine Na c/w high ADH state w/ low EAV  # HFpEF    - strict i/o-  please document urine output  - limit fluid intake to 1 liter daily  - avoid iv fluids  - start torsemide 10mg po daily if Na level down-trending  - TSH noted wnl

## 2022-03-28 NOTE — PROGRESS NOTE ADULT - ASSESSMENT
88 y/o F a PMH of dyslipidemia, renal stone, shingles, HFpEF, GI bleeds, Aflutter s/p ablation, and hypertension presented to the ED after tripping and falling which resulted in a complete and displaced fracture of right femur.     # R Periprosthetic Femur Fx  - s/p R ORIF femur 03/21   - WBAT RLE; OOBTC per Ortho  - PT/Physiatry on board- plan for AR     #HFpEF not in exacerbation  #Aflutter s/p Ablation  - EKG demonstrating NSR  - HF team following, s/p IV lasix with prbc transfusion   - restart home metoprolol   - On PO Lasix 2 times per week/3 times per week alternating - Holding d/t Hyponatremia.   - s/p 3d IV Venofer     #Acute on chronic normocytic anemia   #Suspected Occult GIB   - s/p 2u PRBC with lasix 10 IV   - Iron = 26 // TIBC WNL // %Fe sat = 6  - Ferritin, folate and B12 WNL, MCV 90  - Had a previous colonoscopy ~ 7 years ago however cannot accurately recall results  - Patient was scheduled for capsule endoscopy next week by Dr. Paulino  - GI following: plan for egd/colonoscopy this week but cancelled now, recc OP follow up     # Hypovolemic Hyponatremia- resolved w/ IVF   # Euvolemic Hyponatremia   - pt with lower extremity edema, but on RA no infiltrates on CXR  - per IVC exam by Dr Whiteside pt is euvolemic  - Na previously improved with IVF when pt was anemic/hypovolemic   - now again with worsening Na, suspect euvolemic hyponatremia, poss SIADH vs poor solute intake   - encourage free water restriction, change diet to regular to optimize solute intake   - appreciate Nephro reccs     #Hematuria   - possible traumatic recinos   - will collect UA     # Hypomagnesia  replete prn    # DLD  - c/w Statin     # h/o Tonsillar Cancer  - s/p surgical resection of mass from soft palate, in future ensure anesthesia is aware pt has obturator covering hole in post oropharynx prior to surgery    # DVT PPX: SCD, Lovenox       #Progress Note Handoff:  Pending (specify): appreciate nephro and GI follow up   Family discussion: d/w family at bedside   Disposition: Home___/SNF___/Other________/Unknown at this time__x______    Eva Tawanda, DO    Total time spent to complete patient's bedside assessment, review medical chart, discuss medical plan of care with covering medical team was more than 35 minutes  with >50% of time spent face to face with patient, discussion with patient/family and/or coordination of care .

## 2022-03-28 NOTE — PROGRESS NOTE ADULT - SUBJECTIVE AND OBJECTIVE BOX
LENGTH OF HOSPITAL STAY: 12d      CHIEF COMPLAINT: Patient is a 87y old  Female who presents with a chief complaint of Displaced fracture of R femur 2/2 to Fall (25 Mar 2022 14:19)      OVER Past 24hrs:  No acute overnight events.     HISTORY OF PRESENTING ILLNESS:   HPI: 86 F PMH HFpEF, Afib on Eliquis, Anemia suspected to be secondary to GIB ( but never proven s/p neg EGD and C-scope), HTN, and HLD, hx of tonsillar ca s/p resection w/obturator in placeto cover hole in pallate, BIBEMS s/p mechanical fall at home. Pt lives with her daughter and went to her old home to collect some documents and tripped in the doorway and landed on her right knee. Had immediate pain, unable to ambulate afterwards. Pt normally ambulates w/ a cane. Pt underwent left TKA by Dr. Morton in 2007 and a right TKA by Dr. Perkins at [facility] approx 8 years ago. right PATRICK Type II periprosthetic distal femur fracture. Pt placed in a knee immobilizer.     ED COURSE: As above, HD stable (16 Mar 2022 20:55)    PAST MEDICAL & SURGICAL HISTORY  PAST MEDICAL & SURGICAL HISTORY:  High cholesterol    Kidney stone    Bladder polyp    Shingles  affecting right eye    Atrial flutter    H/O cystoscopy    H/O total knee replacement, right    H/O total knee replacement, left    Cancer of mouth    History of cardioversion          REVIEW OF SYSTEMS  Negative, except as in Physical exam    ALLERGIES:  No Known Allergies    MEDICATIONS:  STANDING MEDICATIONS  cholecalciferol 2000 Unit(s) Oral daily  enoxaparin Injectable 40 milliGRAM(s) SubCutaneous every 24 hours  HYDROmorphone PCA (1 mG/mL) 30 milliLiter(s) PCA Continuous PCA Continuous  metoprolol succinate ER 12.5 milliGRAM(s) Oral daily  pantoprazole    Tablet 40 milliGRAM(s) Oral two times a day  polyethylene glycol 3350 17 Gram(s) Oral daily  prednisoLONE acetate 1% Suspension 1 Drop(s) Right EYE daily  senna 2 Tablet(s) Oral at bedtime  simvastatin 20 milliGRAM(s) Oral at bedtime      PRN MEDICATIONS  acetaminophen     Tablet .. 650 milliGRAM(s) Oral every 6 hours PRN  morphine  - Injectable 2 milliGRAM(s) IV Push every 4 hours PRN  naloxone Injectable 0.1 milliGRAM(s) IV Push every 3 minutes PRN  ondansetron Injectable 4 milliGRAM(s) IV Push every 6 hours PRN    VITALS:   T(F): 96.1  HR: 72  BP: 122/58  RR: 18  SpO2: --        LABS:                        9.7    8.52  )-----------( 222      ( 28 Mar 2022 04:30 )             29.4     03-28    131<L>  |  98  |  24<H>  ----------------------------<  102<H>  5.1<H>   |  27  |  0.6<L>    Ca    8.1<L>      28 Mar 2022 04:30    TPro  4.7<L>  /  Alb  2.4<L>  /  TBili  1.3<H>  /  DBili  x   /  AST  36  /  ALT  24  /  AlkPhos  140<H>  03-28                  RADIOLOGY:    Assessment/Plan:  86 y/o F a PMH of dyslipidemia, renal stone, shingles, HFpEF, GI bleeds, Aflutter s/p ablation, and hypertension presented to the ED after tripping and falling which resulted in a complete and displaced fracture of right femur.     # R Periprosthetic Femur Fx - s/p R ORIF femur 03/21; WBAT RLE; Dispo Acute Rehab     # HFpEF - Not in exacerbation    # Aflutter - s/p Ablation    # Acute on Chronic Normocytic Anemia - Suspected Occult GIB; s/p 2u PRBC with lasix 10 IV ; OP EGD/Colonoscopy as per GI    # Euvolemic Hyponatremia   - pt with lower extremity edema, but on RA no infiltrates on CXR  - per IVC exam by Dr Whiteside pt is euvolemic  - Na previously improved with IVF when pt was anemic/hypovolemic   - now again with worsening Na, suspect euvolemic hyponatremia, poss SIADH vs poor solute intake   - encourage free water restriction, change diet to regular to optimize solute intake   - appreciate Nephro reccs     #Hematuria - Likely 2ry to Eck[place]    # Hypomagnesia - Resolved    # DLD - c/w Statin     # h/o Tonsillar Cancer - s/p surgical resection of mass from soft palate, in future ensure anesthesia is aware pt has obturator covering hole in post oropharynx prior to surgery    # DVT PPX: SCD, Lovenox       Diet: Regular  Activity: WBAT RLE  DVT ppx: Lovenox  GI ppx: Protonix  FULL CODE    Dispo: Acute Rehab

## 2022-03-28 NOTE — CHART NOTE - NSCHARTNOTEFT_GEN_A_CORE
Registered Dietitian Follow-Up     Patient Profile Reviewed                           Yes [x]   No []     Nutrition History Previously Obtained        Yes [x]  No []       Pertinent Subjective Information: Pt has a good appetite; consuming 50% of meals provided, 100% of Magic Cup, 100% of Ensure Enlive. Denies nausea or vomiting.      Pertinent Medical Information:  #HFpEF not in exacerbation  #Aflutter s/p Ablation  #Acute on chronic normocytic anemia   #Suspected Occult GIB   # Hypovolemic Hyponatremia- resolved w/ IVF   # Euvolemic Hyponatremia   # Hypomagnesia  # DLD  # h/o Tonsillar Cancer  # DVT     Diet order:   Clear Liquid diet order as of 3/28.  Previous diet order: regular texture, thin liquids; 1500 mL fluid restriction + Magic Cup 2x/day + Ensure Enlive 3x/day    Anthropometrics:  - Ht: 144.8 cm  - Wt: 75 KG - 3/21  - BMI: 35.8  - IBW: 42 KG     Pertinent Lab Data:  03/28 @ 04:30:  RBC 3.27  H/H 9.7/29.4  Na 131  K+ 5.1  BUN 24  Cr 0.6  Glu 102  Ca 8.1  Alkaline Phosphatase 140     Pertinent Meds:  MEDICATIONS  (STANDING):  cholecalciferol 2000 Unit(s) Oral daily  enoxaparin Injectable 40 milliGRAM(s) SubCutaneous every 24 hours  HYDROmorphone PCA (1 mG/mL) 30 milliLiter(s) PCA Continuous PCA Continuous  metoprolol succinate ER 12.5 milliGRAM(s) Oral daily  pantoprazole    Tablet 40 milliGRAM(s) Oral before breakfast  prednisoLONE acetate 1% Suspension 1 Drop(s) Right EYE daily  senna 2 Tablet(s) Oral at bedtime  simvastatin 20 milliGRAM(s) Oral at bedtime    MEDICATIONS  (PRN):  morphine  - Injectable 2 milliGRAM(s) IV Push every 4 hours PRN Moderate Pain (4 - 6)  naloxone Injectable 0.1 milliGRAM(s) IV Push every 3 minutes PRN For ANY of the following changes in patient status:  A. RR LESS THAN 10 breaths per minute, B. Oxygen saturation LESS THAN 90%, C. Sedation score of 6  ondansetron Injectable 4 milliGRAM(s) IV Push every 6 hours PRN Nausea       Physical Findings:  - Appearance: Edema 1+ to right foot per flowsheet  - GI function: WDL, LBM: 3/27 - 1x per pt  - Tubes: No feeding tubes  - Oral/Mouth cavity: Pt would like to change diet order to easy to chew once medically feasible to upgrade; no swallowing difficulties  - Skin: no PU, surgical incision to right leg  - Cognitive: Alert     Nutrition Requirements  Weight Used: Using ABW 75 KG -- with consideration for cancer and age     Estimated Energy Needs    Continue [x]  Adjust []  ENERGY: 4390-6382 kcal/day (MSJ 1.2-1.5 AF)        Estimated Protein Needs    Continue [x]  Adjust []  PROTEIN: 63-84 g/day (1.5-2 g/kg IBW)        Estimated Fluid Needs        Continue [x]  Adjust []  FLUID: 1875 mL/day (25 mL/kg)     Nutrient Intake: Consuming 50% of meals provided + 100% of Magic Cup + 100% of Ensure Enlive; meeting >75% of estimated energy needs.        [x] Previous Nutrition Diagnosis: Increased Nutrient Needs            [x] Ongoing          [] Resolved    [] No active nutrition diagnosis identified at this time       Goal/Expected Outcome: Continue to meet >75% of estimated energy needs.     Indicator/Monitoring: Diet order, PO intake, weights, labs, NFPF, body composition, tolerance to medical food supplements.    Recommendations:  1. Continue on a clear liquid diet order until medically feasible to upgrade to an easy to chew diet order.  2. Recommend to supplement with Ensure Clear 3x/day while on a clear liquid diet order to optimize kcal/protein intake -- will provide 720 kcal/day total, 24 g pro/day total  3. Encourage PO intake and assist during meals as needed.  4. Please take daily weights.   5. Pt is at mod risk; will f/u in 4-6 days.

## 2022-03-28 NOTE — CHART NOTE - NSCHARTNOTEFT_GEN_A_CORE
- will plan for EGD on 3/29 - Tuesday  - Please correct electrolytes (Target Na = 135-145 | Mg = 1.7-2.2 | K = 3.5-5)  - Please correct INR to <1.5  - Please target Hb  >8  - Please ensure there is one set of CBC BMP and Coagulation profile day prior to procedure and all labs to be optimized the day prior to procedure  - D/C AM Labs unless clinically required  - NPO after midnight Family wants inpatient endoscopic workup    - will plan for EGD on 3/29 - Tuesday  - Please correct electrolytes (Target Na = 135-145 | Mg = 1.7-2.2 | K = 3.5-5)  - Please correct INR to <1.5  - Please target Hb  >8  - Please ensure there is one set of CBC BMP and Coagulation profile day prior to procedure and all labs to be optimized the day prior to procedure  - D/C AM Labs unless clinically required  - NPO after midnight Family wants inpatient endoscopic workup    - will plan for EGD on 3/29 - Tuesday  - Please correct electrolytes (Target Na = 135-145 | Mg = 1.7-2.2 | K = 3.5-5)  - Please correct INR to <1.5  - Please target Hb  >8  - Please ensure there is one set of CBC BMP and Coagulation profile day prior to procedure and all labs to be optimized the day prior to procedure  - D/C AM Labs unless clinically required  - NPO after midnight  - IOC notified

## 2022-03-28 NOTE — PROGRESS NOTE ADULT - SUBJECTIVE AND OBJECTIVE BOX
Gastroenterology progress note:     Patient is a 87y old  Female who presents with a chief complaint of Displaced fracture of R femur 2/2 to Fall (25 Mar 2022 14:19)       Admitted on: 03-16-22    We are following the patient for:       Interval History:    No acute events overnight.   - Diet - tolerating  - last BM - 1 day ago brown  - Abdominal pain - none      PAST MEDICAL & SURGICAL HISTORY:  High cholesterol    Kidney stone    Bladder polyp    Shingles  affecting right eye    Atrial flutter    H/O cystoscopy    H/O total knee replacement, right    H/O total knee replacement, left    Cancer of mouth    History of cardioversion        MEDICATIONS  (STANDING):  cholecalciferol 2000 Unit(s) Oral daily  enoxaparin Injectable 40 milliGRAM(s) SubCutaneous every 24 hours  HYDROmorphone PCA (1 mG/mL) 30 milliLiter(s) PCA Continuous PCA Continuous  metoprolol succinate ER 12.5 milliGRAM(s) Oral daily  pantoprazole    Tablet 40 milliGRAM(s) Oral before breakfast  prednisoLONE acetate 1% Suspension 1 Drop(s) Right EYE daily  senna 2 Tablet(s) Oral at bedtime  simvastatin 20 milliGRAM(s) Oral at bedtime    MEDICATIONS  (PRN):  acetaminophen     Tablet .. 650 milliGRAM(s) Oral every 6 hours PRN Moderate Pain (4 - 6), Severe Pain (7 - 10)  morphine  - Injectable 2 milliGRAM(s) IV Push every 4 hours PRN Moderate Pain (4 - 6)  naloxone Injectable 0.1 milliGRAM(s) IV Push every 3 minutes PRN For ANY of the following changes in patient status:  A. RR LESS THAN 10 breaths per minute, B. Oxygen saturation LESS THAN 90%, C. Sedation score of 6  ondansetron Injectable 4 milliGRAM(s) IV Push every 6 hours PRN Nausea      Allergies  No Known Allergies      Review of Systems:   Cardiovascular:  No Chest Pain, No Palpitations  Respiratory:  No Cough, No Dyspnea  Gastrointestinal:  As described in HPI  Skin:  No Skin Lesions, No Jaundice  Neuro:  No Syncope, No Dizziness    Physical Examination:  T(C): 36.6 (03-28-22 @ 05:37), Max: 36.6 (03-28-22 @ 05:37)  HR: 80 (03-28-22 @ 05:37) (71 - 84)  BP: 132/63 (03-28-22 @ 05:37) (113/58 - 135/61)  RR: 18 (03-28-22 @ 05:37) (18 - 20)  SpO2: 98% (03-27-22 @ 17:44) (95% - 98%)      03-27-22 @ 07:01  -  03-28-22 @ 07:00  --------------------------------------------------------  IN: 1261 mL / OUT: 1300 mL / NET: -39 mL        GENERAL: AAOx3, no acute distress.  HEAD:  Atraumatic, Normocephalic  EYES: conjunctiva and sclera clear  NECK: Supple, no JVD or thyromegaly  CHEST/LUNG: Clear to auscultation bilaterally; No wheeze, rhonchi, or rales  HEART: Regular rate and rhythm; normal S1, S2, No murmurs.  ABDOMEN: Soft, nontender, nondistended; Bowel sounds present  NEUROLOGY: No asterixis or tremor.   SKIN: Intact, no jaundice     Data:                        9.7    8.52  )-----------( 222      ( 28 Mar 2022 04:30 )             29.4     Hgb trend:  9.7  03-28-22 @ 04:30  9.4  03-27-22 @ 04:30  9.3  03-26-22 @ 04:30  9.1  03-25-22 @ 22:16        03-28    131<L>  |  98  |  24<H>  ----------------------------<  102<H>  5.1<H>   |  27  |  0.6<L>    Ca    8.1<L>      28 Mar 2022 04:30    TPro  4.7<L>  /  Alb  2.4<L>  /  TBili  1.3<H>  /  DBili  x   /  AST  36  /  ALT  24  /  AlkPhos  140<H>  03-28    Liver panel trend:  TBili 1.3   /   AST 36   /   ALT 24   /   AlkP 140   /   Tptn 4.7   /   Alb 2.4    /   DBili --      03-28  TBili 1.1   /   AST 35   /   ALT 20   /   AlkP 130   /   Tptn 4.5   /   Alb 2.4    /   DBili --      03-27  TBili 1.3   /   AST 30   /   ALT 14   /   AlkP 121   /   Tptn 4.7   /   Alb 2.5    /   DBili --      03-26  TBili 1.5   /   AST 33   /   ALT 14   /   AlkP 117   /   Tptn 4.6   /   Alb 2.3    /   DBili --      03-25  TBili 1.1   /   AST 32   /   ALT 13   /   AlkP 108   /   Tptn 4.4   /   Alb 2.3    /   DBili --      03-25  TBili 0.8   /   AST 30   /   ALT 11   /   AlkP 102   /   Tptn 4.3   /   Alb 2.1    /   DBili --      03-25  TBili 0.8   /   AST 47   /   ALT 14   /   AlkP 115   /   Tptn 4.8   /   Alb 2.5    /   DBili --      03-24  TBili 0.6   /   AST 43   /   ALT 11   /   AlkP 95   /   Tptn 4.8   /   Alb 2.6    /   DBili --      03-23  TBili 0.7   /   AST 24   /   ALT 11   /   AlkP 96   /   Tptn 5.2   /   Alb 2.7    /   DBili --      03-22  TBili 1.0   /   AST 17   /   ALT 11   /   AlkP 95   /   Tptn 4.8   /   Alb 2.8    /   DBili --      03-21  TBili 1.2   /   AST 15   /   ALT 9   /   AlkP 85   /   Tptn 5.1   /   Alb 2.8    /   DBili --      03-20  TBili 1.0   /   AST 13   /   ALT 7   /   AlkP 79   /   Tptn 5.1   /   Alb 2.8    /   DBili --      03-19             Radiology:

## 2022-03-29 ENCOUNTER — RESULT REVIEW (OUTPATIENT)
Age: 87
End: 2022-03-29

## 2022-03-29 ENCOUNTER — TRANSCRIPTION ENCOUNTER (OUTPATIENT)
Age: 87
End: 2022-03-29

## 2022-03-29 LAB
ALBUMIN SERPL ELPH-MCNC: 2.2 G/DL — LOW (ref 3.5–5.2)
ALP SERPL-CCNC: 126 U/L — HIGH (ref 30–115)
ALT FLD-CCNC: 20 U/L — SIGNIFICANT CHANGE UP (ref 0–41)
ANION GAP SERPL CALC-SCNC: 7 MMOL/L — SIGNIFICANT CHANGE UP (ref 7–14)
APPEARANCE UR: ABNORMAL
APTT BLD: 30.8 SEC — SIGNIFICANT CHANGE UP (ref 27–39.2)
AST SERPL-CCNC: 26 U/L — SIGNIFICANT CHANGE UP (ref 0–41)
BACTERIA # UR AUTO: NEGATIVE — SIGNIFICANT CHANGE UP
BASOPHILS # BLD AUTO: 0.05 K/UL — SIGNIFICANT CHANGE UP (ref 0–0.2)
BASOPHILS NFR BLD AUTO: 0.6 % — SIGNIFICANT CHANGE UP (ref 0–1)
BILIRUB SERPL-MCNC: 1.1 MG/DL — SIGNIFICANT CHANGE UP (ref 0.2–1.2)
BILIRUB UR-MCNC: NEGATIVE — SIGNIFICANT CHANGE UP
BUN SERPL-MCNC: 24 MG/DL — HIGH (ref 10–20)
CALCIUM SERPL-MCNC: 7.8 MG/DL — LOW (ref 8.5–10.1)
CHLORIDE SERPL-SCNC: 97 MMOL/L — LOW (ref 98–110)
CO2 SERPL-SCNC: 24 MMOL/L — SIGNIFICANT CHANGE UP (ref 17–32)
COLOR SPEC: YELLOW — SIGNIFICANT CHANGE UP
CREAT SERPL-MCNC: 0.6 MG/DL — LOW (ref 0.7–1.5)
DIFF PNL FLD: ABNORMAL
EGFR: 87 ML/MIN/1.73M2 — SIGNIFICANT CHANGE UP
EOSINOPHIL # BLD AUTO: 0.21 K/UL — SIGNIFICANT CHANGE UP (ref 0–0.7)
EOSINOPHIL NFR BLD AUTO: 2.6 % — SIGNIFICANT CHANGE UP (ref 0–8)
EPI CELLS # UR: 13 /HPF — HIGH (ref 0–5)
GLUCOSE SERPL-MCNC: 105 MG/DL — HIGH (ref 70–99)
GLUCOSE UR QL: NEGATIVE — SIGNIFICANT CHANGE UP
HCT VFR BLD CALC: 28.4 % — LOW (ref 37–47)
HGB BLD-MCNC: 9.2 G/DL — LOW (ref 12–16)
HYALINE CASTS # UR AUTO: 19 /LPF — HIGH (ref 0–7)
IMM GRANULOCYTES NFR BLD AUTO: 2.8 % — HIGH (ref 0.1–0.3)
INR BLD: 1.17 RATIO — SIGNIFICANT CHANGE UP (ref 0.65–1.3)
KETONES UR-MCNC: NEGATIVE — SIGNIFICANT CHANGE UP
LEUKOCYTE ESTERASE UR-ACNC: ABNORMAL
LYMPHOCYTES # BLD AUTO: 1.45 K/UL — SIGNIFICANT CHANGE UP (ref 1.2–3.4)
LYMPHOCYTES # BLD AUTO: 17.7 % — LOW (ref 20.5–51.1)
MAGNESIUM SERPL-MCNC: 2 MG/DL — SIGNIFICANT CHANGE UP (ref 1.8–2.4)
MCHC RBC-ENTMCNC: 29.2 PG — SIGNIFICANT CHANGE UP (ref 27–31)
MCHC RBC-ENTMCNC: 32.4 G/DL — SIGNIFICANT CHANGE UP (ref 32–37)
MCV RBC AUTO: 90.2 FL — SIGNIFICANT CHANGE UP (ref 81–99)
MONOCYTES # BLD AUTO: 0.8 K/UL — HIGH (ref 0.1–0.6)
MONOCYTES NFR BLD AUTO: 9.8 % — HIGH (ref 1.7–9.3)
NEUTROPHILS # BLD AUTO: 5.45 K/UL — SIGNIFICANT CHANGE UP (ref 1.4–6.5)
NEUTROPHILS NFR BLD AUTO: 66.5 % — SIGNIFICANT CHANGE UP (ref 42.2–75.2)
NITRITE UR-MCNC: NEGATIVE — SIGNIFICANT CHANGE UP
NRBC # BLD: 0 /100 WBCS — SIGNIFICANT CHANGE UP (ref 0–0)
PH UR: 6.5 — SIGNIFICANT CHANGE UP (ref 5–8)
PLATELET # BLD AUTO: 215 K/UL — SIGNIFICANT CHANGE UP (ref 130–400)
POTASSIUM SERPL-MCNC: 4.7 MMOL/L — SIGNIFICANT CHANGE UP (ref 3.5–5)
POTASSIUM SERPL-SCNC: 4.7 MMOL/L — SIGNIFICANT CHANGE UP (ref 3.5–5)
PROT SERPL-MCNC: 4.4 G/DL — LOW (ref 6–8)
PROT UR-MCNC: NEGATIVE — SIGNIFICANT CHANGE UP
PROTHROM AB SERPL-ACNC: 13.4 SEC — HIGH (ref 9.95–12.87)
RBC # BLD: 3.15 M/UL — LOW (ref 4.2–5.4)
RBC # FLD: 20.7 % — HIGH (ref 11.5–14.5)
RBC CASTS # UR COMP ASSIST: 9 /HPF — HIGH (ref 0–4)
SARS-COV-2 RNA SPEC QL NAA+PROBE: SIGNIFICANT CHANGE UP
SODIUM SERPL-SCNC: 128 MMOL/L — LOW (ref 135–146)
SP GR SPEC: 1.01 — LOW (ref 1.01–1.03)
UROBILINOGEN FLD QL: SIGNIFICANT CHANGE UP
WBC # BLD: 8.19 K/UL — SIGNIFICANT CHANGE UP (ref 4.8–10.8)
WBC # FLD AUTO: 8.19 K/UL — SIGNIFICANT CHANGE UP (ref 4.8–10.8)
WBC UR QL: 183 /HPF — HIGH (ref 0–5)

## 2022-03-29 PROCEDURE — 43239 EGD BIOPSY SINGLE/MULTIPLE: CPT

## 2022-03-29 PROCEDURE — 88305 TISSUE EXAM BY PATHOLOGIST: CPT | Mod: 26

## 2022-03-29 PROCEDURE — 88312 SPECIAL STAINS GROUP 1: CPT | Mod: 26

## 2022-03-29 PROCEDURE — 99233 SBSQ HOSP IP/OBS HIGH 50: CPT

## 2022-03-29 RX ORDER — ENOXAPARIN SODIUM 100 MG/ML
40 INJECTION SUBCUTANEOUS EVERY 24 HOURS
Refills: 0 | Status: DISCONTINUED | OUTPATIENT
Start: 2022-03-30 | End: 2022-03-31

## 2022-03-29 RX ORDER — FLUCONAZOLE 150 MG/1
200 TABLET ORAL DAILY
Refills: 0 | Status: DISCONTINUED | OUTPATIENT
Start: 2022-03-30 | End: 2022-03-31

## 2022-03-29 RX ORDER — FLUCONAZOLE 150 MG/1
400 TABLET ORAL ONCE
Refills: 0 | Status: DISCONTINUED | OUTPATIENT
Start: 2022-03-29 | End: 2022-03-31

## 2022-03-29 RX ADMIN — Medication 2000 UNIT(S): at 13:04

## 2022-03-29 RX ADMIN — Medication 1 DROP(S): at 13:03

## 2022-03-29 RX ADMIN — Medication 12.5 MILLIGRAM(S): at 05:53

## 2022-03-29 RX ADMIN — POLYETHYLENE GLYCOL 3350 17 GRAM(S): 17 POWDER, FOR SOLUTION ORAL at 13:05

## 2022-03-29 RX ADMIN — SIMVASTATIN 20 MILLIGRAM(S): 20 TABLET, FILM COATED ORAL at 22:44

## 2022-03-29 RX ADMIN — PANTOPRAZOLE SODIUM 40 MILLIGRAM(S): 20 TABLET, DELAYED RELEASE ORAL at 05:54

## 2022-03-29 RX ADMIN — Medication 10 MILLIGRAM(S): at 13:04

## 2022-03-29 RX ADMIN — PANTOPRAZOLE SODIUM 40 MILLIGRAM(S): 20 TABLET, DELAYED RELEASE ORAL at 18:05

## 2022-03-29 RX ADMIN — SENNA PLUS 2 TABLET(S): 8.6 TABLET ORAL at 22:44

## 2022-03-29 NOTE — CHART NOTE - NSCHARTNOTESELECT_GEN_ALL_CORE
Event Note
EGD Procedure Date
EGD Procedure Note
Medical Optimization and Risk Stratification/Event Note
Nutrition/Event Note
PACU Admission/Event Note
Trauma Tertiary
anesthesia/Transfer Note

## 2022-03-29 NOTE — PROGRESS NOTE ADULT - SUBJECTIVE AND OBJECTIVE BOX
LENGTH OF HOSPITAL STAY: 13d      CHIEF COMPLAINT: Patient is a 87y old  Female who presents with a chief complaint of Displaced fracture of R femur 2/2 to Fall (25 Mar 2022 14:19)      OVER Past 24hrs:  No acute overnight events.     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 Pt lives with her daughter and went to her old home to collect some documents and tripped in the doorway and landed on her right knee. Had immediate pain, unable to ambulate afterwards. Pt normally ambulates w/ a cane. Pt underwent left TKA by Dr. Morton in 2007 and a right TKA by Dr. Perkins at [facility] approx 8 years ago. right PATRICK Type II periprosthetic distal femur fracture. Pt placed in a knee immobilizer.     ED COURSE: As above, HD stable (16 Mar 2022 20:55)    PAST MEDICAL & SURGICAL HISTORY  PAST MEDICAL & SURGICAL HISTORY:  High cholesterol    Kidney stone    Bladder polyp    Shingles  affecting right eye    Atrial flutter    H/O cystoscopy    H/O total knee replacement, right    H/O total knee replacement, left    Cancer of mouth    History of cardioversion          REVIEW OF SYSTEMS  Negative, except as in Physical exam    ALLERGIES:  No Known Allergies    MEDICATIONS:  STANDING MEDICATIONS  cholecalciferol 2000 Unit(s) Oral daily  fluconAZOLE IVPB 400 milliGRAM(s) IV Intermittent once  metoprolol succinate ER 12.5 milliGRAM(s) Oral daily  pantoprazole    Tablet 40 milliGRAM(s) Oral two times a day  polyethylene glycol 3350 17 Gram(s) Oral daily  prednisoLONE acetate 1% Suspension 1 Drop(s) Right EYE daily  senna 2 Tablet(s) Oral at bedtime  simvastatin 20 milliGRAM(s) Oral at bedtime  torsemide 10 milliGRAM(s) Oral daily      PRN MEDICATIONS  acetaminophen     Tablet .. 650 milliGRAM(s) Oral every 6 hours PRN  naloxone Injectable 0.1 milliGRAM(s) IV Push every 3 minutes PRN  ondansetron Injectable 4 milliGRAM(s) IV Push every 6 hours PRN    VITALS:   T(F): 96.5  HR: 65  BP: 121/57  RR: 18  SpO2: 98%        LABS:                        9.2    8.19  )-----------( 215      ( 29 Mar 2022 04:30 )             28.4     03-29    128<L>  |  97<L>  |  24<H>  ----------------------------<  105<H>  4.7   |  24  |  0.6<L>    Ca    7.8<L>      29 Mar 2022 04:30  Mg     2.0     03-29    TPro  4.4<L>  /  Alb  2.2<L>  /  TBili  1.1  /  DBili  x   /  AST  26  /  ALT  20  /  AlkPhos  126<H>  03-29    PT/INR - ( 29 Mar 2022 01:22 )   PT: 13.40 sec;   INR: 1.17 ratio         PTT - ( 29 Mar 2022 01:22 )  PTT:30.8 sec              RADIOLOGY:    Assessment/Plan:  88 y/o F a PMH of dyslipidemia, renal stone, shingles, HFpEF, GI bleeds, Aflutter s/p ablation, and hypertension presented to the ED after tripping and falling which resulted in a complete and displaced fracture of right femur.     # R Periprosthetic Femur Fx - s/p R ORIF femur 03/21; WBAT RLE; Dispo Acute Rehab     # HFpEF - Not in exacerbation    # Aflutter - s/p Ablation    # Acute on Chronic Normocytic Anemia - s/p 2u PRBC with lasix 10 IV ;   - Suspected Occult GIB>> s/p EGD negative except for patchy grade I candidiasis was seen in the middle third of the esophagus;  - fu OP GI for Colonoscopy;    # Candida Esophagitis - Fluconazole 400mg x1 then PO 200mg Daily for 14 days;     # Euvolemic Hyponatremia   - pt with lower extremity edema, but on RA no infiltrates on CXR  - per IVC exam by Dr Whiteside pt is euvolemic  - Na previously improved with IVF when pt was anemic/hypovolemic   - now again with worsening Na, suspect euvolemic hyponatremia, poss SIADH vs poor solute intake   - encourage free water restriction, change diet to regular to optimize solute intake   - appreciate Nephro reccs     #Hematuria - Likely 2ry to Eckert    # Hypomagnesia - Resolved    # DLD - c/w Statin     # h/o Tonsillar Cancer - s/p surgical resection of mass from soft palate, in future ensure anesthesia is aware pt has obturator covering hole in post oropharynx prior to surgery    # DVT PPX: SCD, Lovenox       Diet: Regular  Activity: WBAT RLE  DVT ppx: Lovenox  GI ppx: Protonix  FULL CODE    Dispo: Acute Rehab           LENGTH OF HOSPITAL STAY: 13d      CHIEF COMPLAINT: Patient is a 87y old  Female who presents with a chief complaint of Displaced fracture of R femur 2/2 to Fall (25 Mar 2022 14:19)      OVER Past 24hrs:  No acute overnight events.     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 Pt lives with her daughter and went to her old home to collect some documents and tripped in the doorway and landed on her right knee. Had immediate pain, unable to ambulate afterwards. Pt normally ambulates w/ a cane. Pt underwent left TKA by Dr. Morton in 2007 and a right TKA by Dr. Perkins at [facility] approx 8 years ago. right PATRICK Type II periprosthetic distal femur fracture. Pt placed in a knee immobilizer.     ED COURSE: As above, HD stable (16 Mar 2022 20:55)    PAST MEDICAL & SURGICAL HISTORY  PAST MEDICAL & SURGICAL HISTORY:  High cholesterol    Kidney stone    Bladder polyp    Shingles  affecting right eye    Atrial flutter    H/O cystoscopy    H/O total knee replacement, right    H/O total knee replacement, left    Cancer of mouth    History of cardioversion          REVIEW OF SYSTEMS  Negative, except as in Physical exam    ALLERGIES:  No Known Allergies    MEDICATIONS:  STANDING MEDICATIONS  cholecalciferol 2000 Unit(s) Oral daily  fluconAZOLE IVPB 400 milliGRAM(s) IV Intermittent once  metoprolol succinate ER 12.5 milliGRAM(s) Oral daily  pantoprazole    Tablet 40 milliGRAM(s) Oral two times a day  polyethylene glycol 3350 17 Gram(s) Oral daily  prednisoLONE acetate 1% Suspension 1 Drop(s) Right EYE daily  senna 2 Tablet(s) Oral at bedtime  simvastatin 20 milliGRAM(s) Oral at bedtime  torsemide 10 milliGRAM(s) Oral daily      PRN MEDICATIONS  acetaminophen     Tablet .. 650 milliGRAM(s) Oral every 6 hours PRN  naloxone Injectable 0.1 milliGRAM(s) IV Push every 3 minutes PRN  ondansetron Injectable 4 milliGRAM(s) IV Push every 6 hours PRN    VITALS:   T(F): 96.5  HR: 65  BP: 121/57  RR: 18  SpO2: 98%        LABS:                        9.2    8.19  )-----------( 215      ( 29 Mar 2022 04:30 )             28.4     03-29    128<L>  |  97<L>  |  24<H>  ----------------------------<  105<H>  4.7   |  24  |  0.6<L>    Ca    7.8<L>      29 Mar 2022 04:30  Mg     2.0     03-29    TPro  4.4<L>  /  Alb  2.2<L>  /  TBili  1.1  /  DBili  x   /  AST  26  /  ALT  20  /  AlkPhos  126<H>  03-29    PT/INR - ( 29 Mar 2022 01:22 )   PT: 13.40 sec;   INR: 1.17 ratio         PTT - ( 29 Mar 2022 01:22 )  PTT:30.8 sec        PHYSICAL EXAM:  GENERAL: resting in bed comfortably  HNENT: NCAT MMM   PULMONARY: Clear to auscultation bilaterally. No rales, rhonchi, or wheezing.  CARDIOVASCULAR: Regular rate and rhythm, S1-S2, no murmurs  GASTROINTESTINAL: Soft, non-tender, non-distended, no guarding.  MSK: Warm well perfused, 2+ LE edema   Neuro: AAOx3, moving extremities appropriately       RADIOLOGY:    Assessment/Plan:  88 y/o F a PMH of dyslipidemia, renal stone, shingles, HFpEF, GI bleeds, Aflutter s/p ablation, and hypertension presented to the ED after tripping and falling which resulted in a complete and displaced fracture of right femur.     # R Periprosthetic Femur Fx - s/p R ORIF femur 03/21; WBAT RLE; Dispo Acute Rehab     # HFpEF - Not in exacerbation    # Aflutter - s/p Ablation    # Acute on Chronic Normocytic Anemia - s/p 2u PRBC with lasix 10 IV ;   - Suspected Occult GIB>> s/p EGD negative except for patchy grade I candidiasis was seen in the middle third of the esophagus;  - fu OP GI for Colonoscopy;    # Candida Esophagitis - Fluconazole 400mg x1 then PO 200mg Daily for 14 days;     # Euvolemic Hyponatremia   - pt with lower extremity edema, but on RA no infiltrates on CXR  - per IVC exam by Dr Whiteside pt is euvolemic  - Na previously improved with IVF when pt was anemic/hypovolemic   - now again with worsening Na, suspect euvolemic hyponatremia, poss SIADH vs poor solute intake   - encourage free water restriction, change diet to regular to optimize solute intake   - appreciate Nephro reccs     #Hematuria - Likely 2ry to Eckert    # Hypomagnesia - Resolved    # DLD - c/w Statin     # h/o Tonsillar Cancer - s/p surgical resection of mass from soft palate, in future ensure anesthesia is aware pt has obturator covering hole in post oropharynx prior to surgery    # DVT PPX: SCD, Lovenox       Diet: Regular  Activity: WBAT RLE  DVT ppx: Lovenox  GI ppx: Protonix  FULL CODE    Dispo: Acute Rehab

## 2022-03-29 NOTE — PROGRESS NOTE ADULT - ATTENDING COMMENTS
rehab   oob  follow up labs
***My note supersedes any discrepancies that may be above in the resident's note***    86 y/o F a PMH of dyslipidemia, renal stone, shingles, HFpEF, GI bleeds, A-fib (not on AC due to GI bleed) and hypertension presented to the ED after tripping and falling which resulted in a complete and displaced fracture of right femur    # R Periprosthetic Femur Fx  - NWB Right LE, Knee immobilizer  - Displaced fracture  - DVT ppx: SCD, lovenox  - Pain control w 2mg morphine   - medically optimized  - cardiology: Elevated-risk patient for a Moderate-risk surgery/procedure  - Rapid COVID test    # HFpEF not in exacerbation  # pAFib no longer on AC   -   - c/w Metoprolol Succ ER 12.5 QD  - c/w Lasix 2 times per week/3 times per week alternating   - Ensure Euvolemia (or as close to it as feasible) prior to surgery  - EKG demonstrating NSR    # Anemia suspected 2/2 GIB but workup neg to date  - Iron = 26  - TIBC WNL  -%Fe sat = 6  -Ferratin, folate and B12 WNL   - Ortho requesting Hgb to be 10 as this type of fracture repair is associated with blood loss  - Per HF team, Give Furosemide 10mg IVP after each unit of blood transfusions. Start iv iron sucrose 200 mg daily x5    # Hyponatremia  - 136-->130  - patient is euvolemic on exam. will encourage increased PO intake and monitor for now    # Hypomagnesia  - 1.7, s/p repletement  - continue to trend and monitor    # DLD  - c/w Statin     # h/o Tonsillar Cancer  - s/p surgical resection of mass from soft pallate, ensure anesthesia is aware pt has obturator covering hole in post oropharynx prior to surgery      #Progress Note Handoff  Pending (specify):  OR with Ortho on Monday with Dr. Tiwari  Family discussion: patient and family updated and in agreement of plan  Disposition: Unknown at this time________ .
86 y/o F a PMH of dyslipidemia, renal stone, shingles, HFpEF, GI bleeds, A-fib (not on AC due to GI bleed) and hypertension presented to the ED after tripping and falling which resulted in a complete and displaced fracture of right femur      # R Periprosthetic Femur Fx  - NWB Right LE, Knee immobilizer  - Displaced fracture  - DVT ppx: SCD, lovenox  - Pain control w 2mg morphine   - medically optimized  - cardiology: Elevated-risk patient for a Moderate-risk surgery/procedure  - Rapid COVID test    # HFpEF not in exacerbation  # pAFib no longer on AC   -   - c/w Metoprolol Succ ER 12.5 QD  - c/w Lasix 2 times per week/3 times per week alternating   - Ensure Euvolemia (or as close to it as feasible) prior to surgery  - EKG demonstrating NSR    # Anemia suspected 2/2 GIB but workup neg to date  - Iron = 26  - TIBC WNL  -%Fe sat = 6  -Ferratin, folate and B12 WNL   - Check fecal occult immuno     # DLD  - c/w Statin     # h/o Tonsillar Cancer  - s/p surgical resection of mass from soft pallate, ensure anesthesia is aware pt has obturator covering hole in post oropharynx prior to surgery      #Progress Note Handoff  Pending (specify):  OR with Ortho_  Family discussion: patient and family updated and in agreement of plan  Disposition: Unknown at this time________
86 y/o F a PMH of dyslipidemia, renal stone, shingles, HFpEF, GI bleeds, Aflutter s/p ablation, and hypertension presented to the ED after tripping and falling which resulted in a complete and displaced fracture of right femur.     # R Periprosthetic Femur Fx  - s/p R ORIF femur 03/21   - WBAT RLE; OOBTC per Ortho  - PT/Physiatry on board- plan for AR     #HFpEF not in exacerbation  #Aflutter s/p Ablation  - EKG demonstrating NSR  - HF team following, s/p IV lasix with prbc transfusion   - restart home metoprolol   - On PO Lasix 2 times per week/3 times per week alternating - will switch to Torsemide as per Nephro   - s/p 3d IV Venofer     #Acute on chronic normocytic anemia   #Suspected Occult GIB   - s/p 2u PRBC with lasix 10 IV   - Iron = 26 // TIBC WNL // %Fe sat = 6  - Ferritin, folate and B12 WNL, MCV 90  - Had a previous colonoscopy ~ 7 years ago however cannot accurately recall results  - Patient was scheduled for capsule endoscopy next week by Dr. Paulino  - GI following: EGD today with candida esophagitis, colonoscopy to be performed outpatient ( family is agreeable )     #Candida Esophagitis   Follow up in GI MAP clinic  Fluconazole 400mg x1 and then 200mg qdaily for 14 days.    # Hypovolemic Hyponatremia- resolved w/ IVF   # Hypoosmolar hyponatremia  - pt with lower extremity edema, but on RA no infiltrates on CXR  - per IVC exam by Dr Whiteside pt is euvolemic  - Na previously improved with IVF when pt was anemic/hypovolemic   - now again with worsening Na, suspect euvolemic hyponatremia, poss SIADH vs poor solute intake   - encourage free water restriction ( 1L ), change diet to regular to optimize solute intake   - appreciate Nephro reccs - hypoosmolar hyponatremia, suggest 1L FW restriction, and starting torsemide 10 daily   - strict Is and Os     #Hematuria - improved today   - possible traumatic recinos   - will collect UA - still pending (  nursing communication placed )     # Hypomagnesia  replete prn    # DLD  - c/w Statin     # h/o Tonsillar Cancer  - s/p surgical resection of mass from soft palate, in future ensure anesthesia is aware pt has obturator covering hole in post oropharynx prior to surgery    # DVT PPX: SCD, Lovenox       #Progress Note Handoff:  Pending (specify): poss dc to AR in 24- 48 hours, covid received today   Family discussion: d/w family at bedside   Disposition: Home___/SNF___/Other________/Unknown at this time__x______    Eva Neff DO    Total time spent to complete patient's bedside assessment, review medical chart, discuss medical plan of care with covering medical team was more than 35 minutes  with >50% of time spent face to face with patient, discussion with patient/family and/or coordination of care .
88 y/o F a PMH of dyslipidemia, renal stone, shingles, HFpEF, GI bleeds, A-fib (not on AC due to GI bleed) and hypertension presented to the ED after tripping and falling which resulted in a complete and displaced fracture of right femur    # R Periprosthetic Femur Fx  - s/p R ORIF femur 03/21   - ortho following   - s/p ancef  - WBAT RLE; OOBTC if Ortho permits.   - PT/Physiatry on board.   - DVT ppx: SCD, lovenox  - Pain control   - Hb 5.4 this morning, repeat Hb 7.3. expected loss from surgery. no transfusion. Monitor.       # HFpEF not in exacerbation  # P-Afib  no longer on AC   - last    - 3/23 BP borderline - hold home metoprolol 12.5 mg QD.   - On Lasix 2 times per week/3 times per week alternating - Holding d/t Hyponatremia.   - EKG demonstrating NSR  - Started on IV iron for 5 days as per HF     # Anemia suspected 2/2 GIB but workup neg to date  - s/p 1 PRBC  pre-op for Hgb>10, with lasix 10 IV   - Iron = 26 // TIBC WNL // %Fe sat = 6  -Ferratin, folate and B12 WNL     # Hyponatremia  - 136-->130-->126-->129->128->123  - Uosm/Cara ordered  - patient is euvolemic on exam. will encourage increased PO intake and monitor for now  - Received 1L NS over 4 hours. Repeat Na in AM.     # Hypomagnesia  - 1.9 today , s/p repletement  - continue to trend and monitor      # DLD  - c/w Statin     # h/o Tonsillar Cancer  - s/p surgical resection of mass from soft pallate, ensure anesthesia is aware pt has obturator covering hole in post oropharynx prior to surgery    # DVT PPX: SCD, Lovenox   # GI PPX: PPI   # Activity :OOBTC   # Full code
As described above,87 yo female  patient with anemia without evidence of active bleeding.  She prefers to pursue workup with her private gastroenterologist, Dr Lara as an outpatient.  See above for details  I have reviewed  the above note and agree with the impressions and findings and recommendations
follow hg, agree with above
***My note supersedes any discrepancies that may be above in the resident's note***    88 y/o F a PMH of dyslipidemia, renal stone, shingles, HFpEF, GI bleeds, A-fib (not on AC due to GI bleed) and hypertension presented to the ED after tripping and falling which resulted in a complete and displaced fracture of right femur    # R Periprosthetic Femur Fx  - NWB Right LE, Knee immobilizer  - Displaced fracture  - DVT ppx: SCD, lovenox  - Pain control w 2mg morphine   - medically optimized  - cardiology: Elevated-risk patient for a Moderate-risk surgery/procedure  # HFpEF not in exacerbation  # pAFib no longer on AC   -   - c/w Metoprolol Succ ER 12.5 QD  - c/w Lasix 2 times per week/3 times per week alternating   - Ensure Euvolemia (or as close to it as feasible) prior to surgery  - EKG demonstrating NSR    # Anemia suspected 2/2 GIB but workup neg to date  - Iron = 26  - TIBC WNL  -%Fe sat = 6  -Ferratin, folate and B12 WNL   - Ortho requesting Hgb to be 10 as this type of fracture repair is associated with blood loss  - Per HF team, Give Furosemide 10mg IVP after each unit of blood transfusions. Start iv iron sucrose 200 mg daily x5    # Hyponatremia  - 136-->130-->126-->129, stable at 128  - unsure volume status on exam  - ordering CXR to assess pulmonary congestion in the setting of multiple units of pRBC; possible hypervolemic hyponatremia?  - if so will trial lasis 20mg x1 IV otherwise, patient will need to be encouraged to increase PO as this is notably an acute drop from a normal baseline.     # Leukocytosis  - 14-->13k  - no fevers, or focal signs or symptoms of infectious etiology  - suspect reactionary from surgery  - monitor off abx for now  # Hypomagnesia  - 1.7, s/p repletement  - continue to trend and monitor    # DLD  - c/w Statin     # h/o Tonsillar Cancer  - s/p surgical resection of mass from soft pallate, ensure anesthesia is aware pt has obturator covering hole in post oropharynx prior to surgery      #Progress Note Handoff  Pending (specify):  rehab placement  Family discussion: patient and family updated and in agreement of plan  Disposition: Unknown at this time________ .
86 y/o F a PMH of dyslipidemia, renal stone, shingles, HFpEF, GI bleeds, Aflutter s/p ablation, and hypertension presented to the ED after tripping and falling which resulted in a complete and displaced fracture of right femur.     # R Periprosthetic Femur Fx  - s/p R ORIF femur 03/21   - WBAT RLE; OOBTC per Ortho  - PT/Physiatry on board- plan for AR     #HFpEF not in exacerbation  #Aflutter s/p Ablation  - EKG demonstrating NSR  - HF team following, will give IV lasix with prbc transfusion   - 3/23 BP borderline - held home metoprolol 12.5 mg QD > Resume by 3/25 if BP tolerates.   - On PO Lasix 2 times per week/3 times per week alternating - Holding d/t Hyponatremia.   - s/p 3d IV Venofer     #Acute on chronic normocytic anemia   #Suspected Occult GIB   - s/p 2u PRBC with lasix 10 IV   - Iron = 26 // TIBC WNL // %Fe sat = 6  - Ferritin, folate and B12 WNL, MCV 90  - Had a previous colonoscopy ~ 7 years ago however cannot accurately recall results  - Patient was scheduled for capsule endoscopy next week by Dr. Paulino  - GI following: plan for egd/colonoscopy next week     # Hyponatremia  - 136-->130-->126-->129->128->123> 126>131  NS @ 60cc/hr from 3/24.   - Uosm/Cara ordered  - patient is euvolemic on exam. will encourage increased PO intake and monitor for now    # Hypomagnesia  replete prn    # DLD  - c/w Statin     # h/o Tonsillar Cancer  - s/p surgical resection of mass from soft palate, in future ensure anesthesia is aware pt has obturator covering hole in post oropharynx prior to surgery    # DVT PPX: SCD, Lovenox       #Progress Note Handoff:  Pending (specify):  egd colono next week and then dc to rehab   Family discussion: d/w family at bedside   Disposition: Home___/SNF___/Other________/Unknown at this time__x______    Eva Neff,     Total time spent to complete patient's bedside assessment, review medical chart, discuss medical plan of care with covering medical team was more than 35 minutes  with >50% of time spent face to face with patient, discussion with patient/family and/or coordination of care
***My note supersedes any discrepancies that may be above in the resident's note***    88 y/o F a PMH of dyslipidemia, renal stone, shingles, HFpEF, GI bleeds, A-fib (not on AC due to GI bleed) and hypertension presented to the ED after tripping and falling which resulted in a complete and displaced fracture of right femur    # R Periprosthetic Femur Fx  - NWB Right LE, Knee immobilizer  - Displaced fracture  - DVT ppx: SCD, lovenox  - Pain control w 2mg morphine   - medically optimized  - cardiology: Elevated-risk patient for a Moderate-risk surgery/procedure  # HFpEF not in exacerbation  # pAFib no longer on AC   -   - c/w Metoprolol Succ ER 12.5 QD  - c/w Lasix 2 times per week/3 times per week alternating   - Ensure Euvolemia (or as close to it as feasible) prior to surgery  - EKG demonstrating NSR    # Anemia suspected 2/2 GIB but workup neg to date  - Iron = 26  - TIBC WNL  -%Fe sat = 6  -Ferratin, folate and B12 WNL   - Ortho requesting Hgb to be 10 as this type of fracture repair is associated with blood loss  - Per HF team, Give Furosemide 10mg IVP after each unit of blood transfusions. Start iv iron sucrose 200 mg daily x5    # Hyponatremia  - 136-->130-->126-->129  - patient is euvolemic on exam. will encourage increased PO intake and monitor for now  - continue to monitor on serial BMPs    # Hypomagnesia  - 1.7, s/p repletement  - continue to trend and monitor    # DLD  - c/w Statin     # h/o Tonsillar Cancer  - s/p surgical resection of mass from soft pallate, ensure anesthesia is aware pt has obturator covering hole in post oropharynx prior to surgery      #Progress Note Handoff  Pending (specify):  OR with Ortho today with Dr. Tiwari  Family discussion: patient and family updated and in agreement of plan  Disposition: Unknown at this time________ .

## 2022-03-29 NOTE — CHART NOTE - NSCHARTNOTEFT_GEN_A_CORE
Esophagus: Patchy grade I candidiasis was seen in the middle third of the esophagus. 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 Multiple cold forceps biopsies were performed for histology.    Stomach: A large size hiatal hernia was seen. Retroflexion view in the stomach confirmed the size and morphology of the hernia. Hill Grade IV. Diffuse erythema of the mucosa was noted in the antrum and body. These findings are compatible with non-erosive gastritis. Multiple cold forceps biopsies were performed for histology.    Duodenum: Normal mucosa was noted in the first part of the duodenum and second part of the duodenum. Multiple cold forceps biopsies were performed for histology.    PLAN  Resume Diet  Await pathology results  Follow up in GI MAP clinic  Outpatient Colonoscopy  Target Hb >8  Fluconazole 400mg x1 and then 200mg qdaily for 14 days

## 2022-03-29 NOTE — CHART NOTE - NSCHARTNOTEFT_GEN_A_CORE
PACU ANESTHESIA ADMISSION NOTE      Procedure: EGD      Post op diagnosis:  hiatial hernia      __x__  Patent Airway    __x__  Full return of protective reflexes    __x__  Full recovery from anesthesia / back to baseline     Vitals:   T: 98.4           R:  14                BP:    117/69              Sat:   98%                P: 61      Mental Status:  __x__ Awake   _____ Alert   _____ Drowsy   _____ Sedated    Nausea/Vomiting:  ____ NO  ______Yes,   See Post - Op Orders          Pain Scale (0-10):  _____    Treatment: ____ None    ___x_ See Post - Op/PCA Orders    Post - Operative Fluids:   ____ Oral   __x__ See Post - Op Orders    Plan: Discharge:   ____Home       __ ___Floor     _____Critical Care    _____  Other:_________________    Comments: Pt tolerated procedure well, no anesthesia related complications. Care of pt endorsed to PACU, report given to PACU RN. Discharge when criteria are met.

## 2022-03-30 ENCOUNTER — TRANSCRIPTION ENCOUNTER (OUTPATIENT)
Age: 87
End: 2022-03-30

## 2022-03-30 DIAGNOSIS — T78.40XA ALLERGY, UNSPECIFIED, INITIAL ENCOUNTER: ICD-10-CM

## 2022-03-30 LAB
ALBUMIN SERPL ELPH-MCNC: 2.6 G/DL — LOW (ref 3.5–5.2)
ALP SERPL-CCNC: 149 U/L — HIGH (ref 30–115)
ALT FLD-CCNC: 22 U/L — SIGNIFICANT CHANGE UP (ref 0–41)
ANION GAP SERPL CALC-SCNC: 9 MMOL/L — SIGNIFICANT CHANGE UP (ref 7–14)
AST SERPL-CCNC: 27 U/L — SIGNIFICANT CHANGE UP (ref 0–41)
BASOPHILS # BLD AUTO: 0.04 K/UL — SIGNIFICANT CHANGE UP (ref 0–0.2)
BASOPHILS NFR BLD AUTO: 0.5 % — SIGNIFICANT CHANGE UP (ref 0–1)
BILIRUB SERPL-MCNC: 1.3 MG/DL — HIGH (ref 0.2–1.2)
BUN SERPL-MCNC: 25 MG/DL — HIGH (ref 10–20)
CALCIUM SERPL-MCNC: 8.4 MG/DL — LOW (ref 8.5–10.1)
CHLORIDE SERPL-SCNC: 99 MMOL/L — SIGNIFICANT CHANGE UP (ref 98–110)
CO2 SERPL-SCNC: 25 MMOL/L — SIGNIFICANT CHANGE UP (ref 17–32)
CREAT SERPL-MCNC: 0.7 MG/DL — SIGNIFICANT CHANGE UP (ref 0.7–1.5)
EGFR: 84 ML/MIN/1.73M2 — SIGNIFICANT CHANGE UP
EOSINOPHIL # BLD AUTO: 0.13 K/UL — SIGNIFICANT CHANGE UP (ref 0–0.7)
EOSINOPHIL NFR BLD AUTO: 1.5 % — SIGNIFICANT CHANGE UP (ref 0–8)
GLUCOSE SERPL-MCNC: 117 MG/DL — HIGH (ref 70–99)
HCT VFR BLD CALC: 33 % — LOW (ref 37–47)
HGB BLD-MCNC: 10.6 G/DL — LOW (ref 12–16)
IMM GRANULOCYTES NFR BLD AUTO: 1.2 % — HIGH (ref 0.1–0.3)
LYMPHOCYTES # BLD AUTO: 1.14 K/UL — LOW (ref 1.2–3.4)
LYMPHOCYTES # BLD AUTO: 13.2 % — LOW (ref 20.5–51.1)
MAGNESIUM SERPL-MCNC: 2 MG/DL — SIGNIFICANT CHANGE UP (ref 1.8–2.4)
MCHC RBC-ENTMCNC: 29.3 PG — SIGNIFICANT CHANGE UP (ref 27–31)
MCHC RBC-ENTMCNC: 32.1 G/DL — SIGNIFICANT CHANGE UP (ref 32–37)
MCV RBC AUTO: 91.2 FL — SIGNIFICANT CHANGE UP (ref 81–99)
MONOCYTES # BLD AUTO: 0.73 K/UL — HIGH (ref 0.1–0.6)
MONOCYTES NFR BLD AUTO: 8.4 % — SIGNIFICANT CHANGE UP (ref 1.7–9.3)
NEUTROPHILS # BLD AUTO: 6.52 K/UL — HIGH (ref 1.4–6.5)
NEUTROPHILS NFR BLD AUTO: 75.2 % — SIGNIFICANT CHANGE UP (ref 42.2–75.2)
NRBC # BLD: 0 /100 WBCS — SIGNIFICANT CHANGE UP (ref 0–0)
PLATELET # BLD AUTO: 245 K/UL — SIGNIFICANT CHANGE UP (ref 130–400)
POTASSIUM SERPL-MCNC: 4.5 MMOL/L — SIGNIFICANT CHANGE UP (ref 3.5–5)
POTASSIUM SERPL-SCNC: 4.5 MMOL/L — SIGNIFICANT CHANGE UP (ref 3.5–5)
PROT SERPL-MCNC: 5.1 G/DL — LOW (ref 6–8)
RBC # BLD: 3.62 M/UL — LOW (ref 4.2–5.4)
RBC # FLD: 20.2 % — HIGH (ref 11.5–14.5)
SODIUM SERPL-SCNC: 133 MMOL/L — LOW (ref 135–146)
WBC # BLD: 8.66 K/UL — SIGNIFICANT CHANGE UP (ref 4.8–10.8)
WBC # FLD AUTO: 8.66 K/UL — SIGNIFICANT CHANGE UP (ref 4.8–10.8)

## 2022-03-30 PROCEDURE — 99233 SBSQ HOSP IP/OBS HIGH 50: CPT

## 2022-03-30 RX ORDER — FLUCONAZOLE 150 MG/1
1 TABLET ORAL
Qty: 0 | Refills: 0 | DISCHARGE
Start: 2022-03-30

## 2022-03-30 RX ORDER — SENNA PLUS 8.6 MG/1
2 TABLET ORAL
Qty: 0 | Refills: 0 | DISCHARGE
Start: 2022-03-30

## 2022-03-30 RX ORDER — FUROSEMIDE 40 MG
20 TABLET ORAL ONCE
Refills: 0 | Status: COMPLETED | OUTPATIENT
Start: 2022-03-30 | End: 2022-03-30

## 2022-03-30 RX ORDER — POLYETHYLENE GLYCOL 3350 17 G/17G
17 POWDER, FOR SOLUTION ORAL
Qty: 0 | Refills: 0 | DISCHARGE
Start: 2022-03-30

## 2022-03-30 RX ORDER — ERGOCALCIFEROL 1.25 MG/1
1 CAPSULE ORAL
Qty: 0 | Refills: 0 | DISCHARGE

## 2022-03-30 RX ORDER — IRON SUCROSE 20 MG/ML
200 INJECTION, SOLUTION INTRAVENOUS EVERY 24 HOURS
Refills: 0 | Status: COMPLETED | OUTPATIENT
Start: 2022-03-30 | End: 2022-03-31

## 2022-03-30 RX ORDER — MAGNESIUM SULFATE 500 MG/ML
2 VIAL (ML) INJECTION ONCE
Refills: 0 | Status: COMPLETED | OUTPATIENT
Start: 2022-03-30 | End: 2022-03-30

## 2022-03-30 RX ORDER — ACETAMINOPHEN 500 MG
2 TABLET ORAL
Qty: 0 | Refills: 0 | DISCHARGE
Start: 2022-03-30

## 2022-03-30 RX ORDER — CHOLECALCIFEROL (VITAMIN D3) 125 MCG
2000 CAPSULE ORAL
Qty: 0 | Refills: 0 | DISCHARGE
Start: 2022-03-30

## 2022-03-30 RX ORDER — ENOXAPARIN SODIUM 100 MG/ML
40 INJECTION SUBCUTANEOUS
Qty: 0 | Refills: 0 | DISCHARGE
Start: 2022-03-30

## 2022-03-30 RX ADMIN — ENOXAPARIN SODIUM 40 MILLIGRAM(S): 100 INJECTION SUBCUTANEOUS at 06:32

## 2022-03-30 RX ADMIN — IRON SUCROSE 110 MILLIGRAM(S): 20 INJECTION, SOLUTION INTRAVENOUS at 19:43

## 2022-03-30 RX ADMIN — PANTOPRAZOLE SODIUM 40 MILLIGRAM(S): 20 TABLET, DELAYED RELEASE ORAL at 06:33

## 2022-03-30 RX ADMIN — Medication 25 GRAM(S): at 19:43

## 2022-03-30 RX ADMIN — Medication 12.5 MILLIGRAM(S): at 06:33

## 2022-03-30 RX ADMIN — Medication 2000 UNIT(S): at 11:23

## 2022-03-30 RX ADMIN — FLUCONAZOLE 200 MILLIGRAM(S): 150 TABLET ORAL at 11:23

## 2022-03-30 RX ADMIN — SENNA PLUS 2 TABLET(S): 8.6 TABLET ORAL at 21:49

## 2022-03-30 RX ADMIN — Medication 1 DROP(S): at 11:24

## 2022-03-30 RX ADMIN — Medication 10 MILLIGRAM(S): at 06:34

## 2022-03-30 RX ADMIN — SIMVASTATIN 20 MILLIGRAM(S): 20 TABLET, FILM COATED ORAL at 21:49

## 2022-03-30 RX ADMIN — PANTOPRAZOLE SODIUM 40 MILLIGRAM(S): 20 TABLET, DELAYED RELEASE ORAL at 17:29

## 2022-03-30 RX ADMIN — Medication 20 MILLIGRAM(S): at 19:43

## 2022-03-30 NOTE — DISCHARGE NOTE PROVIDER - NSDCCPCAREPLAN_GEN_ALL_CORE_FT
PRINCIPAL DISCHARGE DIAGNOSIS  Diagnosis: Displaced fracture of right femur  Assessment and Plan of Treatment: -When you fell, your R femur was fractured. This was repair by orthopaedic surgery.  -Please complete physical therapy for rehabilitation  -Please follow up with Dr. Tiwari as needed      SECONDARY DISCHARGE DIAGNOSES  Diagnosis: Acute anemia  Assessment and Plan of Treatment: -You had an episode of low red blood cell levels likely due to bleeding in your gastrointestinal tract.  -You had an EGD which did not reveal any clear source of bleeding.  -Please follow up with the GI clinic at [facility] for further evaluation    Diagnosis: Essential hypertension  Assessment and Plan of Treatment: -Your blood pressure is currently stable off of antihypertensive medications.  -Please see your primary care provider for further management    Diagnosis: Atrial flutter  Assessment and Plan of Treatment: -Please continue your home medications    Diagnosis: Dyslipidemia  Assessment and Plan of Treatment: -Please continue your home medications    Diagnosis: Candidal esophagitis  Assessment and Plan of Treatment: -Please continue to take fluconazole finishing 4/11/22 at night    Diagnosis: Acute hyponatremia  Assessment and Plan of Treatment: -This is likely to resolve on its own with proper diet and rehabilitation  -Please continue to take torsemide daily  -Please follow up with your primary care provider for outside the hospital lab work to determine if this has resolved     PRINCIPAL DISCHARGE DIAGNOSIS  Diagnosis: Displaced fracture of right femur  Assessment and Plan of Treatment: -When you fell, your R femur was fractured. This was repair by orthopaedic surgery. ORIF on 3/21/22  -Please complete physical therapy for rehabilitation  -Please follow up with Dr. Tiwari as needed  - WBAT  - staple removal 14 days after surgery      SECONDARY DISCHARGE DIAGNOSES  Diagnosis: Atrial flutter  Assessment and Plan of Treatment: -Please continue your home medications    Diagnosis: Dyslipidemia  Assessment and Plan of Treatment: -Please continue your home medications    Diagnosis: Acute anemia  Assessment and Plan of Treatment: -You had an episode of low red blood cell levels likely due to bleeding in your gastrointestinal tract.  -You had an EGD which did not reveal any clear source of bleeding.  -Please follow up with the GI clinic at [facility] for further evaluation    Diagnosis: Essential hypertension  Assessment and Plan of Treatment: -Your blood pressure is currently stable off of antihypertensive medications.  -Please see your primary care provider for further management    Diagnosis: Candidal esophagitis  Assessment and Plan of Treatment: -Please continue to take fluconazole finishing 4/11/22 at night    Diagnosis: Acute hyponatremia  Assessment and Plan of Treatment: -This is likely to resolve on its own with proper diet and rehabilitation  -Please continue to take torsemide daily  -Please follow up with your primary care provider for outside the hospital lab work to determine if this has resolved

## 2022-03-30 NOTE — PROGRESS NOTE ADULT - SUBJECTIVE AND OBJECTIVE BOX
LIZA HERNANDEZ 87y Female  MRN#: [id number]   CODE STATUS:________    Hospital Day: 14d    Pt is currently admitted with the primary diagnosis of     SUBJECTIVE  Overnight events   -No major overnight events  Subjective complaints                                             ----------------------------------------------------------  OBJECTIVE  PAST MEDICAL & SURGICAL HISTORY  High cholesterol    Kidney stone    Bladder polyp    Shingles  affecting right eye    Atrial flutter    H/O cystoscopy    H/O total knee replacement, right    H/O total knee replacement, left    Cancer of mouth    History of cardioversion                                              -----------------------------------------------------------  ALLERGIES:  No Known Allergies                                            ------------------------------------------------------------    HOME MEDICATIONS  Home Medications:  Lasix 20 mg oral tablet: 1 tab(s) orally 2 times a week then the following week take it 3 times per week (16 Mar 2022 21:45)  metoprolol succinate 25 mg oral tablet, extended release: 0.5 tab(s) orally once a day (16 Mar 2022 21:45)  prednisoLONE acetate 1% ophthalmic suspension: 1 drop(s) in the right eye once a day (16 Mar 2022 21:45)  Vitamin D2 50 mcg (2000 intl units) oral capsule: 1 cap(s) orally once a day (16 Mar 2022 21:45)                           MEDICATIONS:  STANDING MEDICATIONS  cholecalciferol 2000 Unit(s) Oral daily  enoxaparin Injectable 40 milliGRAM(s) SubCutaneous every 24 hours  fluconAZOLE   Tablet 200 milliGRAM(s) Oral daily  fluconAZOLE IVPB 400 milliGRAM(s) IV Intermittent once  metoprolol succinate ER 12.5 milliGRAM(s) Oral daily  pantoprazole    Tablet 40 milliGRAM(s) Oral two times a day  polyethylene glycol 3350 17 Gram(s) Oral daily  prednisoLONE acetate 1% Suspension 1 Drop(s) Right EYE daily  senna 2 Tablet(s) Oral at bedtime  simvastatin 20 milliGRAM(s) Oral at bedtime  torsemide 10 milliGRAM(s) Oral daily    PRN MEDICATIONS  acetaminophen     Tablet .. 650 milliGRAM(s) Oral every 6 hours PRN  naloxone Injectable 0.1 milliGRAM(s) IV Push every 3 minutes PRN  ondansetron Injectable 4 milliGRAM(s) IV Push every 6 hours PRN                                            ------------------------------------------------------------  VITAL SIGNS: Last 24 Hours  T(C): 36.3 (30 Mar 2022 05:59), Max: 36.9 (29 Mar 2022 11:13)  T(F): 97.4 (30 Mar 2022 05:59), Max: 98.4 (29 Mar 2022 11:13)  HR: 68 (30 Mar 2022 05:59) (61 - 75)  BP: 162/72 (30 Mar 2022 05:59) (103/51 - 162/72)  BP(mean): --  RR: 18 (30 Mar 2022 05:59) (18 - 18)  SpO2: 98% (29 Mar 2022 11:28) (98% - 99%)      22 @ 07:01  -  22 @ 07:00  --------------------------------------------------------  IN: 0 mL / OUT: 1500 mL / NET: -1500 mL                                             --------------------------------------------------------------  LABS:                        9.2    8.19  )-----------( 215      ( 29 Mar 2022 04:30 )             28.4     03-    128<L>  |  97<L>  |  24<H>  ----------------------------<  105<H>  4.7   |  24  |  0.6<L>    Ca    7.8<L>      29 Mar 2022 04:30  Mg     2.0         TPro  4.4<L>  /  Alb  2.2<L>  /  TBili  1.1  /  DBili  x   /  AST  26  /  ALT  20  /  AlkPhos  126<H>  03    PT/INR - ( 29 Mar 2022 01:22 )   PT: 13.40 sec;   INR: 1.17 ratio         PTT - ( 29 Mar 2022 01:22 )  PTT:30.8 sec  Urinalysis Basic - ( 28 Mar 2022 19:22 )    Color: Yellow / Appearance: Turbid / S.006 / pH: x  Gluc: x / Ketone: Negative  / Bili: Negative / Urobili: <2 mg/dL   Blood: x / Protein: Negative / Nitrite: Negative   Leuk Esterase: Large / RBC: 9 /HPF /  /HPF   Sq Epi: x / Non Sq Epi: 13 /HPF / Bacteria: Negative                                            --------------------------------------------------------------    PHYSICAL EXAM:  GENERAL: resting in bed comfortably  HNENT: NCAT MMM   PULMONARY: Clear to auscultation bilaterally. No rales, rhonchi, or wheezing.  CARDIOVASCULAR: Regular rate and rhythm, S1-S2, no murmurs  GASTROINTESTINAL: Soft, non-tender, non-distended, no guarding.  MSK: Warm well perfused, 2+ LE edema   Neuro: AAOx3, moving extremities appropriately                                            --------------------------------------------------------------  88 y/o F a PMH of dyslipidemia, renal stone, shingles, HFpEF, GI bleeds, Aflutter s/p ablation, and hypertension presented to the ED after tripping and falling which resulted in a complete and displaced fracture of right femur.     # R Periprosthetic Femur Fx   - s/p R ORIF femur ; WBAT RLE; Dispo Acute Rehab     # HFpEF   - Not in exacerbation    # Aflutter   - s/p Ablation    # Acute on Chronic Normocytic Anemia - s/p 2u PRBC with lasix 10 IV ;   - Suspected Occult GIB>> s/p EGD patchy grade I candidiasis was seen in the middle third of the esophagus; large hiatal hernia, diffuse gastric erytema (non-erosive gastritis)  - fu OP GI for Colonoscopy;    # Candida Esophagitis   - Fluconazole 400mg x1 then PO 200mg Daily for 14 days;     # Euvolemic Hyponatremia   - pt with lower extremity edema, but on RA no infiltrates on CXR  - per IVC exam by Dr Whiteside pt is euvolemic  - Na previously improved with IVF when pt was anemic/hypovolemic   - now again with worsening Na, suspect euvolemic hyponatremia, poss SIADH vs poor solute intake   - encourage free water restriction, change diet to regular to optimize solute intake   - appreciate Nephro reccs     #Hematuria - Likely 2ry to Eckert    # Hypomagnesia - Resolved    # DLD - c/w Statin     # 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 Fully resolved as of this AM. Otherwise denies complaints                                          ----------------------------------------------------------  OBJECTIVE  PAST MEDICAL & SURGICAL HISTORY  High cholesterol    Kidney stone    Bladder polyp    Shingles  affecting right eye    Atrial flutter    H/O cystoscopy    H/O total knee replacement, right    H/O total knee replacement, left    Cancer of mouth    History of cardioversion                                              -----------------------------------------------------------  ALLERGIES:  No Known Allergies                                            ------------------------------------------------------------    HOME MEDICATIONS  Home Medications:  Lasix 20 mg oral tablet: 1 tab(s) orally 2 times a week then the following week take it 3 times per week (16 Mar 2022 21:45)  metoprolol succinate 25 mg oral tablet, extended release: 0.5 tab(s) orally once a day (16 Mar 2022 21:45)  prednisoLONE acetate 1% ophthalmic suspension: 1 drop(s) in the right eye once a day (16 Mar 2022 21:45)  Vitamin D2 50 mcg (2000 intl units) oral capsule: 1 cap(s) orally once a day (16 Mar 2022 21:45)                           MEDICATIONS:  STANDING MEDICATIONS  cholecalciferol 2000 Unit(s) Oral daily  enoxaparin Injectable 40 milliGRAM(s) SubCutaneous every 24 hours  fluconAZOLE   Tablet 200 milliGRAM(s) Oral daily  fluconAZOLE IVPB 400 milliGRAM(s) IV Intermittent once  metoprolol succinate ER 12.5 milliGRAM(s) Oral daily  pantoprazole    Tablet 40 milliGRAM(s) Oral two times a day  polyethylene glycol 3350 17 Gram(s) Oral daily  prednisoLONE acetate 1% Suspension 1 Drop(s) Right EYE daily  senna 2 Tablet(s) Oral at bedtime  simvastatin 20 milliGRAM(s) Oral at bedtime  torsemide 10 milliGRAM(s) Oral daily    PRN MEDICATIONS  acetaminophen     Tablet .. 650 milliGRAM(s) Oral every 6 hours PRN  naloxone Injectable 0.1 milliGRAM(s) IV Push every 3 minutes PRN  ondansetron Injectable 4 milliGRAM(s) IV Push every 6 hours PRN                                            ------------------------------------------------------------  VITAL SIGNS: Last 24 Hours  T(C): 36.3 (30 Mar 2022 05:59), Max: 36.9 (29 Mar 2022 11:13)  T(F): 97.4 (30 Mar 2022 05:59), Max: 98.4 (29 Mar 2022 11:13)  HR: 68 (30 Mar 2022 05:59) (61 - 75)  BP: 162/72 (30 Mar 2022 05:59) (103/51 - 162/72)  BP(mean): --  RR: 18 (30 Mar 2022 05:59) (18 - 18)  SpO2: 98% (29 Mar 2022 11:28) (98% - 99%)      22 @ 07:01  -  22 @ 07:00  --------------------------------------------------------  IN: 0 mL / OUT: 1500 mL / NET: -1500 mL                                             --------------------------------------------------------------  LABS:                        9.2    8.19  )-----------( 215      ( 29 Mar 2022 04:30 )             28.4     03    128<L>  |  97<L>  |  24<H>  ----------------------------<  105<H>  4.7   |  24  |  0.6<L>    Ca    7.8<L>      29 Mar 2022 04:30  Mg     2.0         TPro  4.4<L>  /  Alb  2.2<L>  /  TBili  1.1  /  DBili  x   /  AST  26  /  ALT  20  /  AlkPhos  126<H>      PT/INR - ( 29 Mar 2022 01:22 )   PT: 13.40 sec;   INR: 1.17 ratio         PTT - ( 29 Mar 2022 01:22 )  PTT:30.8 sec  Urinalysis Basic - ( 28 Mar 2022 19:22 )    Color: Yellow / Appearance: Turbid / S.006 / pH: x  Gluc: x / Ketone: Negative  / Bili: Negative / Urobili: <2 mg/dL   Blood: x / Protein: Negative / Nitrite: Negative   Leuk Esterase: Large / RBC: 9 /HPF /  /HPF   Sq Epi: x / Non Sq Epi: 13 /HPF / Bacteria: Negative                                            --------------------------------------------------------------    PHYSICAL EXAM:  GENERAL: resting in bed comfortably  HNENT: NCAT MMM   PULMONARY: Clear to auscultation bilaterally. No rales, rhonchi, or wheezing.  CARDIOVASCULAR: Regular rate and rhythm, S1-S2, no murmurs  GASTROINTESTINAL: Soft, non-tender, non-distended, no guarding.  MSK: Warm well perfused, 2+ LE edema   Neuro: AAOx3, moving extremities appropriately                                            --------------------------------------------------------------

## 2022-03-30 NOTE — DISCHARGE NOTE PROVIDER - PROVIDER TOKENS
PROVIDER:[TOKEN:[[id number]],FOLLOWUP:[1 week]],PROVIDER:[TOKEN:[[id number]],FOLLOWUP:[Routine]],PROVIDER:[TOKEN:[[id number]],FOLLOWUP:[1 month]]

## 2022-03-30 NOTE — DISCHARGE NOTE PROVIDER - NSDCMRMEDTOKEN_GEN_ALL_CORE_FT
Lasix 20 mg oral tablet: 1 tab(s) orally 2 times a week then the following week take it 3 times per week  metoprolol succinate 25 mg oral tablet, extended release: 0.5 tab(s) orally once a day  pantoprazole 40 mg oral delayed release tablet: 1 tab(s) orally 2 times a day   prednisoLONE acetate 1% ophthalmic suspension: 1 drop(s) in the right eye once a day  simvastatin 20 mg oral tablet: 1 tab(s) orally once a day (at bedtime)  Vitamin D2 50 mcg (2000 intl units) oral capsule: 1 cap(s) orally once a day   acetaminophen 325 mg oral tablet: 2 tab(s) orally every 6 hours, As needed, Moderate Pain (4 - 6), Severe Pain (7 - 10)  cholecalciferol oral tablet: 2000 unit(s) orally once a day  enoxaparin: 40 milligram(s) subcutaneous once a day  fluconazole 200 mg oral tablet: 1 tab(s) orally once a day for 12 days  metoprolol succinate 25 mg oral tablet, extended release: 0.5 tab(s) orally once a day  pantoprazole 40 mg oral delayed release tablet: 1 tab(s) orally 2 times a day   polyethylene glycol 3350 oral powder for reconstitution: 17 gram(s) orally once a day  prednisoLONE acetate 1% ophthalmic suspension: 1 drop(s) in the right eye once a day  senna oral tablet: 2 tab(s) orally once a day (at bedtime)  simvastatin 20 mg oral tablet: 1 tab(s) orally once a day (at bedtime)  torsemide 10 mg oral tablet: 1 tab(s) orally once a day

## 2022-03-30 NOTE — PROGRESS NOTE ADULT - SUBJECTIVE AND OBJECTIVE BOX
LIZA HERNANDEZ  87y Female    INTERVAL HPI/OVERNIGHT EVENTS:    Pt sitting in a recliner. She feels well and has no complaints  Daughter Dorie at bedside  swallowing improved per daughter  pt denies chest pain, SOB, N/V, abdominal pain, urinary complaints.   no fever  recinos removed for TOV today - hematuria much improved per staff    T(F): 97.4 (03-30-22 @ 05:59), Max: 97.4 (03-30-22 @ 05:59)  HR: 68 (03-30-22 @ 05:59) (65 - 71)  BP: 162/72 (03-30-22 @ 05:59) (110/48 - 162/72)  RR: 18 (03-30-22 @ 05:59) (18 - 18)  SpO2: 98% (03-29-22 @ 11:28) (98% - 98%)  I&O's Summary    29 Mar 2022 07:01  -  30 Mar 2022 07:00  --------------------------------------------------------  IN: 0 mL / OUT: 1500 mL / NET: -1500 mL      PHYSICAL EXAM:  GENERAL: NAD in a recliner  HEAD:  Normocephalic  EYES:  conjunctiva and sclera clear  ENMT: Moist mucous membranes  NERVOUS SYSTEM:  Alert, awake, Good concentration  CHEST/LUNG: CTA b/l (only anterior auscultated)  HEART: Regular rate and rhythm  ABDOMEN: Soft, Nontender, Nondistended; Bowel sounds present  EXTREMITIES:   + LE edema b/l  SKIN: right thigh with staples over multiple incisions - clean, intact, no drainage    Consultant(s) Notes Reviewed:  [x ] YES  [ ] NO  Care Discussed with Consultants/Other Providers [ x] YES  [ ] NO    MEDICATIONS  (STANDING):  cholecalciferol 2000 Unit(s) Oral daily  enoxaparin Injectable 40 milliGRAM(s) SubCutaneous every 24 hours  fluconAZOLE   Tablet 200 milliGRAM(s) Oral daily  fluconAZOLE IVPB 400 milliGRAM(s) IV Intermittent once  metoprolol succinate ER 12.5 milliGRAM(s) Oral daily  pantoprazole    Tablet 40 milliGRAM(s) Oral two times a day  polyethylene glycol 3350 17 Gram(s) Oral daily  prednisoLONE acetate 1% Suspension 1 Drop(s) Right EYE daily  senna 2 Tablet(s) Oral at bedtime  simvastatin 20 milliGRAM(s) Oral at bedtime  torsemide 10 milliGRAM(s) Oral daily    MEDICATIONS  (PRN):  acetaminophen     Tablet .. 650 milliGRAM(s) Oral every 6 hours PRN Moderate Pain (4 - 6), Severe Pain (7 - 10)  naloxone Injectable 0.1 milliGRAM(s) IV Push every 3 minutes PRN For ANY of the following changes in patient status:  A. RR LESS THAN 10 breaths per minute, B. Oxygen saturation LESS THAN 90%, C. Sedation score of 6  ondansetron Injectable 4 milliGRAM(s) IV Push every 6 hours PRN Nausea      LABS:                        10.6   8.66  )-----------( 245      ( 30 Mar 2022 04:30 )             33.0     03-30    133<L>  |  99  |  25<H>  ----------------------------<  117<H>  4.5   |  25  |  0.7    Ca    8.4<L>      30 Mar 2022 04:30  Mg     2.0     03-30    TPro  5.1<L>  /  Alb  2.6<L>  /  TBili  1.3<H>  /  DBili  x   /  AST  27  /  ALT  22  /  AlkPhos  149<H>  03-30    PT/INR - ( 29 Mar 2022 01:22 )   PT: 13.40 sec;   INR: 1.17 ratio         PTT - ( 29 Mar 2022 01:22 )  PTT:30.8 sec      RADIOLOGY & ADDITIONAL TESTS:    Imaging or report Personally Reviewed:  [x ] YES  [ ] NO    < from: TTE Echo Complete w/ Contrast w/ Doppler (03.18.22 @ 11:56) >  Summary:   1. Normal global left ventricular systolic function.   2. Moderately enlarged left atrium.   3. Normal right atrial size.   4. Mild mitral annular calcification.   5. Mild mitral valve regurgitation.   6. Mild tricuspid regurgitation.   7. PSAP 45.   8. Mild aortic regurgitation.   9. Sclerotic aortic valve with decreased opening.  10. Peak gradient of 20 with a mean of 11 c/w Mild AS.  11. Mild pulmonic valve regurgitation.  12. Asc aorta 3.6cm.    < end of copied text >      < from: [facility] Duplex Lower Ext Vein Scan, Bilat (03.28.22 @ 19:19) >  Impression:    No evidence of deep venous thrombosis or superficial thrombophlebitis in   the bilateral lower extremities.  Right peroneal vein was not visualized due to edema.    < end of copied text >      < from: Xray Chest 1 View- PORTABLE-Urgent (Xray Chest 1 View- PORTABLE-Urgent .) (03.27.22 @ 13:53) >    Impression:    No radiographic evidence of acute cardiopulmonary disease.    < end of copied text >      Case discussed with resident and RN today    Care discussed with pt and family

## 2022-03-30 NOTE — PROGRESS NOTE ADULT - ASSESSMENT
# Hypoosmolar hyponatremia / high urine osm, low urine Na c/w high ADH state w/ low EAV  # HFpEF    - sodium level improving with the addition of torsemide  - limit fluid intake to 1 liter daily  - avoid iv fluids  - cont 10mg po daily if Na level down-trending  - TSH noted wnl  - pt stable for discharge from nephrology stand-point / cont fluid restriction and torsemide outpatient

## 2022-03-30 NOTE — DISCHARGE NOTE PROVIDER - NSDCCPTREATMENT_GEN_ALL_CORE_FT
PRINCIPAL PROCEDURE  Procedure: XR femur RT 2V  Findings and Treatment: Right hip: No acute fracture or dislocation. Moderate right hip   osteoarthritis. Similar appearing moderate left hip osteoarthritis.   Lumbosacral spine degenerative changes.  Right femur: Acute displaced right distal femoral metadiaphyseal fracture   completely with approximately 2.6 cm of lateral displacement of the   distal component and approximately 3 cm of overlap.  Right knee: Right total knee arthroplasty. Periprosthetic fracture of the   distal femoral metadiaphysis with displacement as above. No dislocation   of the knee joint.  Right tibia/fibula: Partially imaged proximal portions appear okay   without fracture.  IMPRESSION:  1.  Periprosthetic fracture of the right distal femur complete just   proximal to the total knee arthroplasty with approximately 2.6 cm of   lateral displacement and approximately 3 cm of bony overlap.  2.  Knee joint arthroplasty appears in anatomic alignment without   dislocation.        SECONDARY PROCEDURE  Procedure: EGD  Findings and Treatment: Esophagus: Patchy grade I candidiasis was seen in the middle third of the esophagus. 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 Multiple cold forceps biopsies were performed for histology.  Stomach: A large size hiatal hernia was seen. Retroflexion view in the stomach confirmed the size and morphology of the hernia. Hill Grade IV. Diffuse erythema of the mucosa was noted in the antrum and body. These findings are compatible with non-erosive gastritis. Multiple cold forceps biopsies were performed for histology.  Duodenum: Normal mucosa was noted in the first part of the duodenum and second part of the duodenum. Multiple cold forceps biopsies were performed for histology.  PLAN  Resume Diet  Await pathology results  Follow up in GI [facility] clinic  Outpatient Colonoscopy  Fluconazole 400mg x1 and then 200mg qdaily for 14 days (through 4/12/22)

## 2022-03-30 NOTE — DISCHARGE NOTE PROVIDER - HOSPITAL COURSE
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 Pt lives with her daughter and went to her old home to collect some documents and tripped in the doorway and landed on her right knee. Had immediate pain, unable to ambulate afterwards. Pt normally ambulates w/ a cane. Pt underwent left TKA by Dr. Morton in 2007 and a right TKA by Dr. Perkins at [facility] approx 8 years ago. right PATRICK Type II periprosthetic distal femur fracture. Pt placed in a knee immobilizer.     Hospital Course:  Pt admitted following fall at home, found to have R periprosthetic femur fx fixed per ortho surgery with ORIF on 3/21. Pt evaluated and determined to be good candidate for rehab, planning for discharge to acute rehab facility. Stay complicated by acute on chronic normocytic anemia. Pt evaluated for possible GI source. workup included EGD which revealed esophageal candidiasis + non-erosive gastritis. Biopsies taken, pt to receive fluconazole until 4/12/22 and to follow up with Gi outpatient for colonoscopy. Pt Hg stable following 2u pRBC with no apparent episodes of drop in Hg. Pt also noted to have hyponatremia, followed by HF and nephrology. 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 Pt lives with her daughter and went to her old home to collect some documents and tripped in the doorway and landed on her right knee. Had immediate pain, unable to ambulate afterwards. Pt normally ambulates w/ a cane. Pt underwent left TKA by Dr. Morton in 2007 and a right TKA by Dr. Perkins at [facility] approx 8 years ago. right PATRICK Type II periprosthetic distal femur fracture. Pt placed in a knee immobilizer.     Hospital Course:  Pt admitted following fall at home, found to have R periprosthetic femur fx fixed per ortho surgery with ORIF on 3/21. Pt evaluated and determined to be good candidate for rehab, planning for discharge to acute rehab facility. Stay complicated by acute on chronic normocytic anemia. Pt evaluated for possible GI source. workup included EGD which revealed esophageal candidiasis + non-erosive gastritis. Biopsies taken, pt to receive fluconazole until 4/12/22 and to follow up with Gi outpatient for colonoscopy. Pt Hg stable following 2u pRBC with no apparent episodes of drop in Hg. Pt also noted to have hyponatremia, followed by HF and nephrology. 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 Pt determined to be stable to rehabilitation outside the hospital with proper outpatient follow up.

## 2022-03-30 NOTE — DISCHARGE NOTE PROVIDER - CARE PROVIDERS DIRECT ADDRESSES
[email or web address] [email or web address]

## 2022-03-30 NOTE — PROGRESS NOTE ADULT - SUBJECTIVE AND OBJECTIVE BOX
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 Pt lives with her daughter and went to her old home to collect some documents and tripped in the doorway and landed on her right knee. Had immediate pain, unable to ambulate afterwards. Pt normally ambulates w/ a cane. Pt underwent left TKA by Dr. Morton in  and a right TKA by Dr. Perkins at [facility] approx 8 years ago. right PATRICK Type II periprosthetic distal femur fracture. Pt placed in a knee immobilizer.   ED COURSE: As above, HD stable (16 Mar 2022 20:55)    Patient and daughter bedside report patient had a mechanical fall at home, denies LOC/dizziness/lightheadedness. Also denies head trauma. Patient sustained injury to R femur. Prior to fall, patient states she was ambulating without issue. Endorses a low Na diet and compliance with home medications. Denies chest pain, palpitations, SOB, abdominal discomfort, n/v, or loss of appetite.    PAST MEDICAL & SURGICAL HISTORY:  HLD  HFpEF       FAMILY HISTORY:  Mother:  at 99 old age  Father:  at 92 MRSA    SOCIAL HISTORY:    Patient denies smoking, alcohol or drug use    Allergies  No Known Allergies    Intolerances    PHYSICAL EXAM:  General Appearance: well appearing, normal for age and gender. 	  Neck: normal JVP, no bruit.   Eyes:  Extra Ocular muscles intact.   Cardiovascular: regular rate and rhythm S1 S2, No JVD, No murmurs   Respiratory: Lungs clear to auscultation	  Psychiatry: Alert and oriented x 3, Mood & affect appropriate  Gastrointestinal:  Soft, Non-tender  Skin/Integumen: No rashes, No ecchymoses, No cyanosis	  Neurologic: Non-focal  Musculoskeletal/ extremities: RLE limited ROM, No clubbing, cyanosis, +1-2 B/L LE edema  Vascular: Peripheral pulses palpable 2+ bilaterally      CARDIAC MARKERS:  Serum Pro-Brain Natriuretic Peptide: 669 pg/mL (22 @ 19:55)        PREVIOUS DIAGNOSTIC TESTING:    TTE 3/18/22    Summary:   1. Normal global left ventricular systolic function.   2. Moderately enlarged left atrium.   3. Normal right atrial size.   4. Mild mitral annular calcification.   5. Mild mitral valve regurgitation.   6. Mild tricuspid regurgitation.   7. PSAP 45.   8. Mild aortic regurgitation.   9. Sclerotic aortic valve with decreased opening.  10. Peak gradient of 20 with a mean of 11 c/w Mild AS.  11. Mild pulmonic valve regurgitation.  12. Asc aorta 3.6cm.      TTE 21  Summary:   1. LV Ejection Fraction by Pinto's Method with a biplane EF of 65 %.   2. Normal global left ventricular systolic function.   3. Moderate left ventricular hypertrophy.   4. Spectral Doppler shows pseudonormal pattern of left ventricular myocardial filling (Grade II diastolic dysfunction).   5. Severely enlarged left atrium.   6. Severely enlarged right atrium.   7. Sclerotic aortic valve with normal opening.   8. Mild aortic regurgitation.   9. Thickening and calcification of the anterior and posterior mitral leaflets.  10. Mild-to-moderate mitral regurgitation.  11. Moderate tricuspid regurgitation.  12. Estimated pulmonary artery systolic pressure is 36.7 mmHg assuming a right atrial pressure of 15 mmHg, which is consistent with borderline pulmonary hypertension.      Home Medications:    MEDICATIONS  (STANDING):  apixaban 5 milliGRAM(s) Oral two times a day  metoprolol tartrate 25 milliGRAM(s) Oral two times a day  simvastatin 20 milliGRAM(s) Oral at bedtime    MEDICATIONS  (PRN):

## 2022-03-30 NOTE — PROGRESS NOTE ADULT - ASSESSMENT
88 y/o F a PMH of dyslipidemia, renal stone, shingles, HFpEF, GI bleeds, Aflutter s/p ablation, and hypertension presented to the ED after tripping and falling which resulted in a complete and displaced fracture of right femur.     # R Periprosthetic Femur Fx   - s/p R ORIF femur 03/21  -WBAT RLE  -Dispo Acute Rehab     # Acute on Chronic Normocytic Anemia - s/p 2u PRBC with lasix 10 IV ;   - Suspected Occult GIB>> s/p EGD patchy grade I candidiasis was seen in the middle third of the esophagus; large hiatal hernia, diffuse gastric erytema (non-erosive gastritis)  - fu OP GI for Colonoscopy;    # Candida Esophagitis   - Fluconazole 400mg x1 then PO 200mg Daily for 14 days (stop 4/12/22)    # HFpEF   - Not in exacerbation    # Aflutter   - s/p Ablation    # Euvolemic Hyponatremia   - pt with lower extremity edema, but on RA no infiltrates on CXR  - per IVC exam by Dr Whiteside pt is euvolemic  - Na previously improved with IVF when pt was anemic/hypovolemic   - now again with worsening Na, suspect euvolemic hyponatremia, poss SIADH vs poor solute intake   - encourage free water restriction, change diet to regular to optimize solute intake   - appreciate Nephro reccs     #Hematuria  - Likely 2ry to Eckert  -TOV 3/30/22    # Hypomagnesia - Resolved    # DLD - c/w Statin     # h/o Tonsillar Cancer - s/p surgical resection of mass from soft palate, in future ensure anesthesia is aware pt has obturator covering hole in post oropharynx prior to surgery    # DVT PPX: SCD, Lovenox     Diet: Regular  Activity: WBAT RLE  DVT ppx: Lovenox  GI ppx: Protonix  FULL CODE    Dispo: Acute Rehab (family wants [facility] vs [facility])

## 2022-03-30 NOTE — PROGRESS NOTE ADULT - ASSESSMENT
Chronic diastolic heart failure / Anemia / Atrial flutter / CAD/ Distal Fx fracture s/p mechanical fall    Patient appears close to euvolemic on exam   Give Furosemide 20mg IVP x1 today  Continue Torsemide 10mg daily   Get BMP daily   3 out of 5 day course of IV iron given- would give IV iron x1 today and x1 tomorrow to complete course   Obtain iron profile with AM labs   Mag 2.0- replete   Maintain potassium >4.0, Mg >2.1  Strict intake and output  Daily weight   Femur fx as per ortho/trauma team  Patient is cleared from cardiac standpoint to participate in acute rehab  Plan discussed with primary team  Follow up with outpatient heart failure office   Chronic diastolic heart failure / Anemia / Atrial flutter / CAD/ Distal Fx fracture s/p mechanical fall    Patient appears close to euvolemic on exam   Give Furosemide 20mg IVP x1 today  Continue Torsemide 10mg daily   Get BMP daily   3 out of 5 day course of IV iron given- would give IV iron x1 today and x1 tomorrow to complete course   Obtain iron profile with AM labs   Mag 2.0- replete   Maintain potassium >4.0, Mg >2.1  Strict intake and output  Daily weight   Femur fx as per ortho/trauma team  Patient is cleared from cardiac standpoint to participate in acute rehab  Plan discussed with primary team  Follow up with outpatient heart failure office.

## 2022-03-30 NOTE — DISCHARGE NOTE PROVIDER - NSDCFUSCHEDAPPT_GEN_ALL_CORE_FT
LIZA HERNANDEZ ; 05/13/2022 ; [facility] [address] LIZA Schuler ; 05/18/2022 ; [facility] Cardio [address]

## 2022-03-30 NOTE — PROGRESS NOTE ADULT - ASSESSMENT
86 y/o woman with PMH of dyslipidemia, renal stone, shingles, HFpEF, GI bleed, Aflutter s/p ablation and hypertension presented to the ED after tripping and falling which resulted in a right periprosthetic distal femur fracture.     1. s/p mechanical fall resulting in a right Periprosthetic Femur Fracture  - s/p Right ORIF femur 03/21   - WBAT RLE; OOBTC per Ortho  - case discussed with Ortho today - wound can be left open to air and staples should be removed 2 weeks postop  - seen by PT and rehab  - family wants [facility] on discharge  - fall precautions  - on lovenox for DVT prophylaxis and continue on discharge    2. Chronic HFpEF   - not in exacerbation  - HF team following, s/p IV lasix with prbc transfusion  - now on torsemide 10mg daily  - heart failure f/u for clearance for acute rehab needed prior to discharge    3. Aflutter s/p Ablation  - EKG report:  NSR  - restarted home metoprolol     4. Acute on chronic normocytic anemia   Suspected Occult GI Bleed  - s/p 2u PRBC   - Iron = 26 // TIBC WNL // %Fe sat = 6  - Ferritin, folate and B12 WNL, MCV 90  - Had a previous colonoscopy ~ 7 years ago however cannot accurately recall results  - Patient was scheduled for capsule endoscopy next week by Dr. Paulino  - GI following:  s/p EGD 3/29 which showed candidal esophagitis, colonoscopy to be performed outpatient ( family is agreeable )     5. Candidal Esophagitis   Follow up in GI MAP clinic for pathology results  Fluconazole 400mg x1 and then 200mg qdaily for 14 days.    6. Hypovolemic Hyponatremia- resolved w/ IVF   Hypoosmolar hyponatremia  - pt with lower extremity edema, but on RA and no infiltrates on CXR  - per IVC exam by Dr Whiteside pt is euvolemic  - Na previously improved with IVF when pt was anemic/hypovolemic   - then had worsening hyponatremia - suspect euvolemic hyponatremia, poss SIADH vs poor solute intake   - encourage free water restriction ( 1L ), changed diet to regular to optimize solute intake   - appreciate Nephro recs - hypoosmolar hyponatremia, suggest 1L Free water restriction, and starting torsemide 10 daily   - strict Is and Os   - Na level improved today    7. Hematuria - improved today per staff (recinos was removed before I saw the pt)  - possible traumatic recinos   - UA reviewed - pt asymptomatic, no leukocytosis or fever - likely asymptomatic pyuria  with large blood   - voiding trial in progress    8. Dyslipidemia  - c/w Statin     9. h/o Tonsillar Cancer  - s/p surgical resection of mass from soft palate, in future ensure anesthesia is aware pt has obturator covering hole in post oropharynx prior to surgery    10. ?allergic reaction to propofol - pt had itching yesterday per daughter and aide  don't see note about this event - family wants reaction documented        PROGRESS NOTE HANDOFF    Pending: heart failure f/u for clearance for acute rehab, voiding trial today    Family discussion: with daughter Dorie at bedside today    Disposition: [facility] acute rehab 86 y/o woman with PMH of dyslipidemia, renal stone, shingles, HFpEF, GI bleed, Aflutter s/p ablation and hypertension presented to the ED after tripping and falling which resulted in a right periprosthetic distal femur fracture.     1. s/p mechanical fall resulting in a right Periprosthetic Femur Fracture  - s/p Right ORIF femur 03/21   - WBAT RLE; OOBTC per Ortho  - case discussed with Ortho today - wound can be left open to air and staples should be removed 2 weeks postop  - seen by PT and rehab  - family wants [facility] on discharge  - fall precautions  - on lovenox for DVT prophylaxis and continue on discharge    2. Chronic HFpEF   - not in exacerbation  - HF team following, s/p IV lasix with prbc transfusion  - now on torsemide 10mg daily  - heart failure f/u for clearance for acute rehab needed prior to discharge    3. Aflutter s/p Ablation  - EKG report:  NSR  - restarted home metoprolol     4. Acute on chronic normocytic anemia   Suspected Occult GI Bleed  - s/p 2u PRBC   - Iron = 26 // TIBC WNL // %Fe sat = 6  - Ferritin, folate and B12 WNL, MCV 90  - Had a previous colonoscopy ~ 7 years ago however cannot accurately recall results  - Patient was scheduled for capsule endoscopy next week by Dr. Paulino  - GI following:  s/p EGD 3/29 which showed candidal esophagitis, colonoscopy to be performed outpatient ( family is agreeable )     5. Candidal Esophagitis   Follow up in GI MAP clinic for pathology results  Fluconazole 400mg x1 and then 200mg qdaily for 14 days.    6. Hypovolemic Hyponatremia- resolved w/ IVF   Hypoosmolar hyponatremia  - pt with lower extremity edema, but on RA and no infiltrates on CXR  - per IVC exam by Dr Whiteside pt is euvolemic  - Na previously improved with IVF when pt was anemic/hypovolemic   - then had worsening hyponatremia - suspect euvolemic hyponatremia, poss SIADH vs poor solute intake   - encourage free water restriction ( 1L ), changed diet to regular to optimize solute intake   - appreciate Nephro recs - hypoosmolar hyponatremia, suggest 1L Free water restriction, and starting torsemide 10 daily   - strict Is and Os   - Na level improved today    7. Hematuria - improved today per staff (recinos was removed before I saw the pt)  - possible traumatic recinos   - UA reviewed - pt asymptomatic, no leukocytosis or fever - likely asymptomatic pyuria  with large blood   - voiding trial in progress    8. Dyslipidemia  - c/w Statin     9. h/o Tonsillar Cancer  - s/p surgical resection of mass from soft palate, in future ensure anesthesia is aware pt has obturator covering hole in post oropharynx prior to surgery    10. ?allergic reaction to propofol - pt had itching yesterday per daughter and aide  don't see note about this event - family wants reaction documented        PROGRESS NOTE HANDOFF    Pending: heart failure f/u for clearance for acute rehab, voiding trial today    Family discussion: with daughter Dorie at bedside today    Disposition: [facility] acute rehab      medication reconciliation and discharge papers reviewed by me    spent 45 minutes on the discharge process of this pt

## 2022-03-30 NOTE — DISCHARGE NOTE PROVIDER - CARE PROVIDER_API CALL
Dalia Arredondo (DO)  Family Medicine  [address] [place] [address]  Phone: [phone]  Fax: [phone]  Follow Up Time: 1 week    Tevin Tiwari)  Orthopaedic Surgery  [address] [place] [address]  Phone: [phone]  Fax: [phone]  Follow Up Time: Routine    Kailash Woodard)  Gastroenterology; Internal Medicine  [address] [place] [address]  Phone: [phone]  Fax: [phone]  Follow Up Time: 1 month

## 2022-03-30 NOTE — PROGRESS NOTE ADULT - SUBJECTIVE AND OBJECTIVE BOX
Nephrology Progress Note    LIZA HERNANDEZ  MRN-[id number]  87y  Female    S:  Patient is seen and examined, events over the last 24h noted.    O:  Allergies:  propofol (Urticaria)    Hospital Medications:   MEDICATIONS  (STANDING):  cholecalciferol 2000 Unit(s) Oral daily  enoxaparin Injectable 40 milliGRAM(s) SubCutaneous every 24 hours  fluconAZOLE   Tablet 200 milliGRAM(s) Oral daily  fluconAZOLE IVPB 400 milliGRAM(s) IV Intermittent once  iron sucrose IVPB 200 milliGRAM(s) IV Intermittent every 24 hours  metoprolol succinate ER 12.5 milliGRAM(s) Oral daily  pantoprazole    Tablet 40 milliGRAM(s) Oral two times a day  polyethylene glycol 3350 17 Gram(s) Oral daily  prednisoLONE acetate 1% Suspension 1 Drop(s) Right EYE daily  senna 2 Tablet(s) Oral at bedtime  simvastatin 20 milliGRAM(s) Oral at bedtime  torsemide 10 milliGRAM(s) Oral daily    MEDICATIONS  (PRN):  acetaminophen     Tablet .. 650 milliGRAM(s) Oral every 6 hours PRN Moderate Pain (4 - 6), Severe Pain (7 - 10)  naloxone Injectable 0.1 milliGRAM(s) IV Push every 3 minutes PRN For ANY of the following changes in patient status:  A. RR LESS THAN 10 breaths per minute, B. Oxygen saturation LESS THAN 90%, C. Sedation score of 6  ondansetron Injectable 4 milliGRAM(s) IV Push every 6 hours PRN Nausea    Home Medications:  acetaminophen 325 mg oral tablet: 2 tab(s) orally every 6 hours, As needed, Moderate Pain (4 - 6), Severe Pain (7 - 10) (30 Mar 2022 12:29)  cholecalciferol oral tablet: 2000 unit(s) orally once a day (30 Mar 2022 12:29)  enoxaparin: 40 milligram(s) subcutaneous once a day (30 Mar 2022 12:29)  fluconazole 200 mg oral tablet: 1 tab(s) orally once a day for 12 days (30 Mar 2022 12:29)  metoprolol succinate 25 mg oral tablet, extended release: 0.5 tab(s) orally once a day (16 Mar 2022 21:45)  polyethylene glycol 3350 oral powder for reconstitution: 17 gram(s) orally once a day (30 Mar 2022 12:29)  prednisoLONE acetate 1% ophthalmic suspension: 1 drop(s) in the right eye once a day (16 Mar 2022 21:45)  senna oral tablet: 2 tab(s) orally once a day (at bedtime) (30 Mar 2022 12:29)  torsemide 10 mg oral tablet: 1 tab(s) orally once a day (30 Mar 2022 12:29)      VITALS:  Daily     Daily   T(F): 97.8 (22 @ 20:00), Max: 97.8 (22 @ 20:00)  HR: 66 (22 @ 20:00)  BP: 98/52 (22 @ 20:00)  RR: 18 (22 @ 20:00)  SpO2: 96% (22 @ 20:00)  Wt(kg): --  I&O's Detail    29 Mar 2022 07:  -  30 Mar 2022 07:00  --------------------------------------------------------  IN:  Total IN: 0 mL    OUT:    Indwelling Catheter - Urethral (mL): 1500 mL  Total OUT: 1500 mL    Total NET: -1500 mL        I&O's Summary    29 Mar 2022 07:01  -  30 Mar 2022 07:00  --------------------------------------------------------  IN: 0 mL / OUT: 1500 mL / NET: -1500 mL          PHYSICAL EXAM:  Gen: NAD  Resp: CTAB  Card: S1/S2  Abd: soft  Extremities: b/l LE edema      LABS:          133<L>  |  99  |  25<H>  ----------------------------<  117<H>  4.5   |  25  |  0.7    Ca    8.4<L>      30 Mar 2022 04:30  Mg     2.0         TPro  5.1<L>  /  Alb  2.6<L>  /  TBili  1.3<H>  /  DBili      /  AST  27  /  ALT  22  /  AlkPhos  149<H>      eGFR if Non African American: 66 mL/min/1.73M2 (22 @ 06:05)  eGFR if African American: 77 mL/min/1.73M2 (22 @ 06:05)    Phosphorus Level, Serum: 3.6 mg/dL (22 @ 04:30)    Osmolality, Serum: 264 mos/kg (22 @ 10:29)                          10.6   8.66  )-----------( 245      ( 30 Mar 2022 04:30 )             33.0     Mean Cell Volume: 91.2 fL (22 @ 04:30)    Ferritin, Serum: 195 ng/mL (22 @ 04:30)  % Saturation, Iron: 6 % (22 @ 19:00)      Urine Studies:  Urinalysis Basic - ( 28 Mar 2022 19:22 )    Color: Yellow / Appearance: Turbid / S.006 / pH:   Gluc:  / Ketone: Negative  / Bili: Negative / Urobili: <2 mg/dL   Blood:  / Protein: Negative / Nitrite: Negative   Leuk Esterase: Large / RBC: 9 /HPF /  /HPF   Sq Epi:  / Non Sq Epi: 13 /HPF / Bacteria: Negative        Urea Nitrogen,  Random Urine: 838 mg/dL (22 @ 17:13)    Sodium, Random Urine: <20.0 mmoL/L ( @ 17:13)  Osmolality, Random Urine: 439 mos/kg ( @ 17:13)    Creatinine trend:  Creatinine, Serum: 0.7 mg/dL (22 @ 04:30)  Creatinine, Serum: 0.6 mg/dL (22 @ 04:30)  Creatinine, Serum: 0.6 mg/dL (22 @ 04:30)  Creatinine, Serum: 0.6 mg/dL (22 @ 04:30)  Creatinine, Serum: 0.7 mg/dL (22 @ 04:30)  Creatinine, Serum: 0.8 mg/dL (22 @ 22:16)      Sodium, Serum: 133 mmol/L (22 @ 04:30)  Sodium, Serum: 128 mmol/L (22 @ 04:30)  Sodium, Serum: 131 mmol/L (22 @ 04:30)  Sodium, Serum: 127 mmol/L (22 @ 04:30)  Sodium, Serum: 130 mmol/L (22 @ 04:30)  Sodium, Serum: 129 mmol/L (22 @ 22:16)  Sodium, Serum: 128 mmol/L (22 @ 17:23)    Osmolality, Random Urine: 439 mos/kg [50 - 1200] (22 @ 17:13)  Sodium, Random Urine: <20.0 mmoL/L (22 @ 17:13)  Osmolality, Serum: 264 mos/kg [280 - 301] (22 @ 10:29)    Thyroid Stimulating Hormone, Serum: 1.13 uIU/mL [0.27 - 4.20] (22 @ 04:30)

## 2022-03-31 ENCOUNTER — TRANSCRIPTION ENCOUNTER (OUTPATIENT)
Age: 87
End: 2022-03-31

## 2022-03-31 VITALS
SYSTOLIC BLOOD PRESSURE: 107 MMHG | TEMPERATURE: 97 F | HEART RATE: 68 BPM | RESPIRATION RATE: 18 BRPM | OXYGEN SATURATION: 97 % | DIASTOLIC BLOOD PRESSURE: 53 MMHG

## 2022-03-31 LAB
ALBUMIN SERPL ELPH-MCNC: 2.5 G/DL — LOW (ref 3.5–5.2)
ALP SERPL-CCNC: 135 U/L — HIGH (ref 30–115)
ALT FLD-CCNC: 18 U/L — SIGNIFICANT CHANGE UP (ref 0–41)
ANION GAP SERPL CALC-SCNC: 10 MMOL/L — SIGNIFICANT CHANGE UP (ref 7–14)
AST SERPL-CCNC: 23 U/L — SIGNIFICANT CHANGE UP (ref 0–41)
BILIRUB SERPL-MCNC: 1.2 MG/DL — SIGNIFICANT CHANGE UP (ref 0.2–1.2)
BUN SERPL-MCNC: 23 MG/DL — HIGH (ref 10–20)
CALCIUM SERPL-MCNC: 8.2 MG/DL — LOW (ref 8.5–10.1)
CHLORIDE SERPL-SCNC: 99 MMOL/L — SIGNIFICANT CHANGE UP (ref 98–110)
CO2 SERPL-SCNC: 28 MMOL/L — SIGNIFICANT CHANGE UP (ref 17–32)
CREAT SERPL-MCNC: 0.7 MG/DL — SIGNIFICANT CHANGE UP (ref 0.7–1.5)
EGFR: 84 ML/MIN/1.73M2 — SIGNIFICANT CHANGE UP
GLUCOSE SERPL-MCNC: 112 MG/DL — HIGH (ref 70–99)
HCT VFR BLD CALC: 31.4 % — LOW (ref 37–47)
HGB BLD-MCNC: 9.9 G/DL — LOW (ref 12–16)
IRON SATN MFR SERPL: 229 UG/DL — HIGH (ref 35–150)
IRON SATN MFR SERPL: 85 % — HIGH (ref 15–50)
MCHC RBC-ENTMCNC: 28.9 PG — SIGNIFICANT CHANGE UP (ref 27–31)
MCHC RBC-ENTMCNC: 31.5 G/DL — LOW (ref 32–37)
MCV RBC AUTO: 91.8 FL — SIGNIFICANT CHANGE UP (ref 81–99)
NRBC # BLD: 0 /100 WBCS — SIGNIFICANT CHANGE UP (ref 0–0)
PLATELET # BLD AUTO: 202 K/UL — SIGNIFICANT CHANGE UP (ref 130–400)
POTASSIUM SERPL-MCNC: 4.2 MMOL/L — SIGNIFICANT CHANGE UP (ref 3.5–5)
POTASSIUM SERPL-SCNC: 4.2 MMOL/L — SIGNIFICANT CHANGE UP (ref 3.5–5)
PROT SERPL-MCNC: 4.7 G/DL — LOW (ref 6–8)
RBC # BLD: 3.42 M/UL — LOW (ref 4.2–5.4)
RBC # FLD: 20.4 % — HIGH (ref 11.5–14.5)
SODIUM SERPL-SCNC: 137 MMOL/L — SIGNIFICANT CHANGE UP (ref 135–146)
TIBC SERPL-MCNC: 269 UG/DL — SIGNIFICANT CHANGE UP (ref 220–430)
UIBC SERPL-MCNC: 40 UG/DL — LOW (ref 110–370)
WBC # BLD: 7.6 K/UL — SIGNIFICANT CHANGE UP (ref 4.8–10.8)
WBC # FLD AUTO: 7.6 K/UL — SIGNIFICANT CHANGE UP (ref 4.8–10.8)

## 2022-03-31 PROCEDURE — 99239 HOSP IP/OBS DSCHRG MGMT >30: CPT

## 2022-03-31 RX ADMIN — IRON SUCROSE 110 MILLIGRAM(S): 20 INJECTION, SOLUTION INTRAVENOUS at 13:31

## 2022-03-31 RX ADMIN — Medication 2000 UNIT(S): at 12:37

## 2022-03-31 RX ADMIN — ENOXAPARIN SODIUM 40 MILLIGRAM(S): 100 INJECTION SUBCUTANEOUS at 05:22

## 2022-03-31 RX ADMIN — Medication 10 MILLIGRAM(S): at 05:20

## 2022-03-31 RX ADMIN — Medication 12.5 MILLIGRAM(S): at 05:20

## 2022-03-31 RX ADMIN — Medication 1 DROP(S): at 12:37

## 2022-03-31 RX ADMIN — PANTOPRAZOLE SODIUM 40 MILLIGRAM(S): 20 TABLET, DELAYED RELEASE ORAL at 05:20

## 2022-03-31 RX ADMIN — FLUCONAZOLE 200 MILLIGRAM(S): 150 TABLET ORAL at 12:37

## 2022-03-31 NOTE — DISCHARGE NOTE NURSING/CASE MANAGEMENT/SOCIAL WORK - PATIENT PORTAL LINK FT
You can access the FollowMyHealth Patient Portal offered by [facility] by registering at the following website: [email or web address]. By joining [facility]’s FollowMyHealth portal, you will also be able to view your health information using other applications (apps) compatible with our system.

## 2022-03-31 NOTE — PROGRESS NOTE ADULT - PROVIDER SPECIALTY LIST ADULT
Hospitalist
Hospitalist
Internal Medicine
Orthopedics
Gastroenterology
Heart Failure
Internal Medicine
Nephrology
Orthopedics
Heart Failure
Hospitalist
Internal Medicine

## 2022-03-31 NOTE — DISCHARGE NOTE NURSING/CASE MANAGEMENT/SOCIAL WORK - NSDCPEFALRISK_GEN_ALL_CORE
For information on Fall & Injury Prevention, visit: [email or web address] OR  [email or web address] OR  https://www.cdc.gov/steadi/patient.html

## 2022-03-31 NOTE — PROGRESS NOTE ADULT - ASSESSMENT
86 y/o woman with PMH of dyslipidemia, renal stone, shingles, HFpEF, GI bleed, Aflutter s/p ablation and hypertension presented to the ED after tripping and falling which resulted in a right periprosthetic distal femur fracture.     1. s/p mechanical fall resulting in a right Periprosthetic Femur Fracture  - s/p Right ORIF femur 03/21   - WBAT RLE; OOBTC per Ortho  - case discussed with Ortho - wound can be left open to air and staples should be removed 2 weeks postop  - seen by PT and rehab  - family wants Yuly on discharge  - fall precautions  - on lovenox for DVT prophylaxis and continue on discharge    2. Chronic HFpEF   - not in exacerbation  - HF team f/u appreciated, s/p IV lasix with prbc transfusion and s/p lasix 20mg IV x 1 yesterday  - LE edema improved  - now on torsemide 10mg daily  - cleared by heart failure for acute rehab  - outpt f/u with Dr. Whiteside    3. h/o Aflutter s/p Ablation  - EKG report: NSR  - restarted home metoprolol     4. Acute on chronic normocytic anemia   Suspected Occult GI Bleed  - s/p 2u PRBC   - Iron = 26 // TIBC WNL // %Fe sat = 6  - Ferritin, folate and B12 WNL, MCV 90  - Had a previous colonoscopy ~ 7 years ago however cannot accurately recall results  - Patient was scheduled for capsule endoscopy next week by Dr. Paulino  - GI following: s/p EGD 3/29 which showed candidal esophagitis, colonoscopy to be performed outpatient ( family is agreeable )   - s/p >6 doses of venofer this admission     5. Candidal Esophagitis   Follow up in GI MAP clinic for pathology results  Fluconazole 400mg x1 and then 200mg qdaily for 14 days.    6. Hypovolemic Hyponatremia- resolved w/ IVF   Hypoosmolar hyponatremia  - pt with lower extremity edema, but on RA and no infiltrates on CXR  - per IVC exam by Dr Whiteside pt is euvolemic  - Na previously improved with IVF when pt was anemic/hypovolemic   - then had worsening hyponatremia - suspect euvolemic hyponatremia, poss SIADH vs poor solute intake   - encourage free water restriction ( 1L ), changed diet to regular to optimize solute intake   - appreciate Nephro recs - hypoosmolar hyponatremia, suggest 1L Free water restriction, and starting torsemide 10 daily   - strict Is and Os   - Na level continues to improve    7. Hematuria - now resolved   - possible traumatic recinos   - UA reviewed - pt asymptomatic, no leukocytosis or fever - likely asymptomatic pyuria  with large blood   - passed voiding trial    8. Dyslipidemia  - c/w Statin     9. h/o Tonsillar Cancer  - s/p surgical resection of mass from soft palate, in future ensure anesthesia is aware pt has obturator covering hole in post oropharynx prior to surgery    10. ?allergic reaction to propofol - pt had itching on 3/29 per daughter and aide  don't see note about this event - family wants reaction documented        PROGRESS NOTE HANDOFF    Pending: discharge to [facility]    Family discussion: with son Dr. Prescott at bedside today    Disposition: [facility] acute rehab      medication reconciliation and discharge papers reviewed by me    spent 45 minutes on the discharge process of this pt

## 2022-03-31 NOTE — PROGRESS NOTE ADULT - SUBJECTIVE AND OBJECTIVE BOX
Discharge note    DAVIDERNALIZA  87y Female    INTERVAL HPI/OVERNIGHT EVENTS:  Pt in bed - son Dr. Prescott at bedside    she feels well - voiding - primafit in place and almost full  no fever, chest pain, N/V, abdominal pain, SOB  ROS negative  LE edema much improved from last week per son    T(F): 97.6 (03-31-22 @ 06:26), Max: 97.8 (03-30-22 @ 20:00)  HR: 65 (03-31-22 @ 06:26) (65 - 66)  BP: 128/59 (03-31-22 @ 06:26) (98/52 - 128/59)  RR: 17 (03-31-22 @ 06:26) (17 - 18)  SpO2: 96% (03-30-22 @ 20:00) (96% - 96%) on RA  I&O's Summary    30 Mar 2022 07:01  -  31 Mar 2022 07:00  --------------------------------------------------------  IN: 0 mL / OUT: 700 mL / NET: -700 mL    31 Mar 2022 07:01  -  31 Mar 2022 14:04  --------------------------------------------------------  IN: 0 mL / OUT: 900 mL / NET: -900 mL      PHYSICAL EXAM:  GENERAL: NAD  HEAD:  Normocephalic  EYES:  conjunctiva and sclera clear  ENMT: Moist mucous membranes  NERVOUS SYSTEM:  Alert, awake, Good concentration  CHEST/LUNG: CTA b/l  HEART: Regular rate and rhythm  ABDOMEN: Soft, Nontender, Nondistended; Bowel sounds present  EXTREMITIES:  decreased LE edema  SKIN: dressings over right thigh  : primafit    Consultant(s) Notes Reviewed:  [x ] YES  [ ] NO  Care Discussed with Consultants/Other Providers [ x] YES  [ ] NO    MEDICATIONS  (STANDING):  cholecalciferol 2000 Unit(s) Oral daily  enoxaparin Injectable 40 milliGRAM(s) SubCutaneous every 24 hours  fluconAZOLE   Tablet 200 milliGRAM(s) Oral daily  fluconAZOLE IVPB 400 milliGRAM(s) IV Intermittent once  metoprolol succinate ER 12.5 milliGRAM(s) Oral daily  pantoprazole    Tablet 40 milliGRAM(s) Oral two times a day  polyethylene glycol 3350 17 Gram(s) Oral daily  prednisoLONE acetate 1% Suspension 1 Drop(s) Right EYE daily  senna 2 Tablet(s) Oral at bedtime  simvastatin 20 milliGRAM(s) Oral at bedtime  torsemide 10 milliGRAM(s) Oral daily    MEDICATIONS  (PRN):  acetaminophen     Tablet .. 650 milliGRAM(s) Oral every 6 hours PRN Moderate Pain (4 - 6), Severe Pain (7 - 10)  naloxone Injectable 0.1 milliGRAM(s) IV Push every 3 minutes PRN For ANY of the following changes in patient status:  A. RR LESS THAN 10 breaths per minute, B. Oxygen saturation LESS THAN 90%, C. Sedation score of 6  ondansetron Injectable 4 milliGRAM(s) IV Push every 6 hours PRN Nausea      LABS:                        9.9    7.60  )-----------( 202      ( 31 Mar 2022 07:04 )             31.4     03-31    137  |  99  |  23<H>  ----------------------------<  112<H>  4.2   |  28  |  0.7    Ca    8.2<L>      31 Mar 2022 07:04  Mg     2.0     03-30    TPro  4.7<L>  /  Alb  2.5<L>  /  TBili  1.2  /  DBili  x   /  AST  23  /  ALT  18  /  AlkPhos  135<H>  03-31            Case discussed with resident and RN today    Care discussed with pt and family

## 2022-04-02 LAB — SURGICAL PATHOLOGY STUDY: SIGNIFICANT CHANGE UP

## 2022-04-03 LAB — FERRITIN SERPL-MCNC: 2478 NG/ML — HIGH (ref 15–150)

## 2022-04-13 ENCOUNTER — NON-APPOINTMENT (OUTPATIENT)
Age: 87
End: 2022-04-13

## 2022-04-15 DIAGNOSIS — S72.491A OTHER FRACTURE OF LOWER END OF RIGHT FEMUR, INITIAL ENCOUNTER FOR CLOSED FRACTURE: ICD-10-CM

## 2022-04-15 DIAGNOSIS — E78.5 HYPERLIPIDEMIA, UNSPECIFIED: ICD-10-CM
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
## 2022-04-15 DIAGNOSIS — B37.81 CANDIDAL ESOPHAGITIS: ICD-10-CM

## 2022-04-15 DIAGNOSIS — K44.9 DIAPHRAGMATIC HERNIA WITHOUT OBSTRUCTION OR GANGRENE: ICD-10-CM

## 2022-04-15 DIAGNOSIS — I50.32 CHRONIC DIASTOLIC (CONGESTIVE) HEART FAILURE: ICD-10-CM

## 2022-04-15 DIAGNOSIS — Z96.653 PRESENCE OF ARTIFICIAL KNEE JOINT, BILATERAL: ICD-10-CM

## 2022-04-15 DIAGNOSIS — I11.0 HYPERTENSIVE HEART DISEASE WITH HEART FAILURE: ICD-10-CM

## 2022-04-15 DIAGNOSIS — Z85.819 PERSONAL HISTORY OF MALIGNANT NEOPLASM OF UNSPECIFIED SITE OF LIP, ORAL CAVITY, AND PHARYNX: ICD-10-CM

## 2022-04-15 DIAGNOSIS — E22.2 SYNDROME OF INAPPROPRIATE SECRETION OF ANTIDIURETIC HORMONE: ICD-10-CM

## 2022-04-15 DIAGNOSIS — I48.91 UNSPECIFIED ATRIAL FIBRILLATION: ICD-10-CM

## 2022-04-15 DIAGNOSIS — D62 ACUTE POSTHEMORRHAGIC ANEMIA: ICD-10-CM

## 2022-04-15 DIAGNOSIS — Z20.822 CONTACT WITH AND (SUSPECTED) EXPOSURE TO COVID-19: ICD-10-CM

## 2022-04-15 DIAGNOSIS — E83.42 HYPOMAGNESEMIA: ICD-10-CM

## 2022-04-15 DIAGNOSIS — Z79.01 LONG TERM (CURRENT) USE OF ANTICOAGULANTS: ICD-10-CM

## 2022-04-15 DIAGNOSIS — N20.0 CALCULUS OF KIDNEY: ICD-10-CM

## 2022-04-15 DIAGNOSIS — M97.11XA PERIPROSTHETIC FRACTURE AROUND INTERNAL PROSTHETIC RIGHT KNEE JOINT, INITIAL ENCOUNTER: ICD-10-CM
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
## 2022-04-15 DIAGNOSIS — B02.30 ZOSTER OCULAR DISEASE, UNSPECIFIED: ICD-10-CM

## 2022-04-15 DIAGNOSIS — K29.71 GASTRITIS, UNSPECIFIED, WITH BLEEDING: ICD-10-CM

## 2022-04-15 DIAGNOSIS — M16.31 UNILATERAL OSTEOARTHRITIS RESULTING FROM HIP DYSPLASIA, RIGHT HIP: ICD-10-CM

## 2022-05-13 ENCOUNTER — APPOINTMENT (OUTPATIENT)
Dept: HEMATOLOGY ONCOLOGY | Facility: CLINIC | Age: 87
End: 2022-05-13

## 2022-05-18 ENCOUNTER — APPOINTMENT (OUTPATIENT)
Dept: CARDIOLOGY | Facility: CLINIC | Age: 87
End: 2022-05-18

## 2022-05-25 ENCOUNTER — APPOINTMENT (OUTPATIENT)
Dept: CARDIOLOGY | Facility: CLINIC | Age: 87
End: 2022-05-25
Payer: MEDICARE

## 2022-05-25 VITALS
HEIGHT: 58 IN | OXYGEN SATURATION: 97 % | HEART RATE: 94 BPM | BODY MASS INDEX: 33.8 KG/M2 | DIASTOLIC BLOOD PRESSURE: 64 MMHG | SYSTOLIC BLOOD PRESSURE: 130 MMHG | WEIGHT: 161 LBS

## 2022-05-25 DIAGNOSIS — D69.6 THROMBOCYTOPENIA, UNSPECIFIED: ICD-10-CM

## 2022-05-25 PROCEDURE — 99214 OFFICE O/P EST MOD 30 MIN: CPT

## 2022-05-25 PROCEDURE — 93000 ELECTROCARDIOGRAM COMPLETE: CPT

## 2022-05-25 RX ORDER — PANTOPRAZOLE 40 MG/1
40 TABLET, DELAYED RELEASE ORAL DAILY
Qty: 30 | Refills: 2 | Status: DISCONTINUED | COMMUNITY
Start: 2022-01-25 | End: 2022-05-25

## 2022-05-26 PROBLEM — D69.6 THROMBOCYTOPENIA: Status: ACTIVE | Noted: 2022-03-04

## 2022-05-31 NOTE — HISTORY OF PRESENT ILLNESS
[FreeTextEntry1] : 87 F PMH HFpEF, Afib  eliquis on hold , Anemia suspected to be secondary to GIB ( but never proven s/p neg EGD and C-scope), HTN, and HLD, R. femur Fx s/p ORIF.\par \par Patient is here today with her son for HFU. patient states she is feeling better. leaving the hospital patient was in rehab facility ( [facility]) for 2 weeks. she is now living with her daughter where  she received PT/OT at home for 4 week and she has an Aid 12 hrs/day. she walks with a walker\par patients state that her ambulation has improved since receiving the PT/OT  Patient wants to do cardiac rehab but has no one that can take her and ask if she could continue with her home the therapy. she is getting her services through [facility].\par  \par \par

## 2022-05-31 NOTE — REASON FOR VISIT
[FreeTextEntry1] : Patient is a 86 y/o female  that is here in office for HFU, s/p R femur ORIF due to fall on 3/16/22. pt. was evaluated  by heart failure team in hospital  found to be anemic, s/p  blood transfusion x 2units\par \par

## 2022-06-28 NOTE — ED ADULT NURSE NOTE - NS ED NURSE REPORT GIVEN DT
16-Mar-2022 21:40 Glycopyrrolate Counseling:  I discussed with the patient the risks of glycopyrrolate including but not limited to skin rash, drowsiness, dry mouth, difficulty urinating, and blurred vision.

## 2022-07-27 ENCOUNTER — APPOINTMENT (OUTPATIENT)
Dept: CARDIOLOGY | Facility: CLINIC | Age: 87
End: 2022-07-27

## 2022-07-27 PROCEDURE — 99442: CPT | Mod: 95

## 2022-08-04 LAB
BASOPHILS # BLD AUTO: 0.04 K/UL
BASOPHILS NFR BLD AUTO: 0.8 %
EOSINOPHIL # BLD AUTO: 0.12 K/UL
EOSINOPHIL NFR BLD AUTO: 2.4 %
HCT VFR BLD CALC: 39.1 %
HGB BLD-MCNC: 12.1 G/DL
IMM GRANULOCYTES NFR BLD AUTO: 0.2 %
LYMPHOCYTES # BLD AUTO: 1.52 K/UL
LYMPHOCYTES NFR BLD AUTO: 31 %
MAN DIFF?: NORMAL
MCHC RBC-ENTMCNC: 30.9 G/DL
MCHC RBC-ENTMCNC: 32.4 PG
MCV RBC AUTO: 104.8 FL
MONOCYTES # BLD AUTO: 0.51 K/UL
MONOCYTES NFR BLD AUTO: 10.4 %
NEUTROPHILS # BLD AUTO: 2.7 K/UL
NEUTROPHILS NFR BLD AUTO: 55.2 %
PLATELET # BLD AUTO: 133 K/UL
RBC # BLD: 3.73 M/UL
RBC # FLD: 12.7 %
WBC # FLD AUTO: 4.9 K/UL

## 2022-09-14 NOTE — HISTORY OF PRESENT ILLNESS
[FreeTextEntry1] : 87 F PMH HFpEF, Afib eliquis on hold , Anemia suspected to be secondary to GIB ( but never proven s/p neg EGD and C-scope), HTN, and HLD, R. femur Fx s/p ORIF. \par \par She is stable, walking at home with a walker. No c/o SOB. She denies any chest pain, dizziness. She takes diuretics 4 times/week and metoprolol 12.5 mg daily. Her weight has been stable around 168-170 lbs. \par \par Her vitals today are as follows, -120/ 50-76    HR 60-80s. \par \par She is on Eliquis 2.5 mg BID with no evidence of bleeding.  \par \par \par \par

## 2022-09-14 NOTE — PLAN
[FreeTextEntry1] : Labs \par Letter \par Furosemide 4 times/week \par metoprolol 12.5 mg 5 days /week \par Eliquis 2.5 mg BID\par Continue PT at home \par RTO 4 months  \par \par \par \par Andrew Byrnes MD, FACC, FSA \par Advanced Heart Failure/ Mechanical Circulatory Support\par Pulmonary Hypertension and Cardiac Amyloidosis \par [facility]\par [facility]  \par \par

## 2022-09-19 LAB
ANION GAP SERPL CALC-SCNC: 13 MMOL/L
ANION GAP SERPL CALC-SCNC: 18 MMOL/L
BASOPHILS # BLD AUTO: 0.04 K/UL
BASOPHILS # BLD AUTO: 0.05 K/UL
BASOPHILS NFR BLD AUTO: 0.8 %
BASOPHILS NFR BLD AUTO: 1 %
BUN SERPL-MCNC: 27 MG/DL
BUN SERPL-MCNC: 37 MG/DL
CALCIUM SERPL-MCNC: 9.5 MG/DL
CALCIUM SERPL-MCNC: 9.7 MG/DL
CHLORIDE SERPL-SCNC: 94 MMOL/L
CHLORIDE SERPL-SCNC: 98 MMOL/L
CO2 SERPL-SCNC: 27 MMOL/L
CO2 SERPL-SCNC: 28 MMOL/L
CREAT SERPL-MCNC: 0.9 MG/DL
CREAT SERPL-MCNC: 1.2 MG/DL
EGFR: 44 ML/MIN/1.73M2
EGFR: 62 ML/MIN/1.73M2
EOSINOPHIL # BLD AUTO: 0.09 K/UL
EOSINOPHIL # BLD AUTO: 0.13 K/UL
EOSINOPHIL NFR BLD AUTO: 2.1 %
EOSINOPHIL NFR BLD AUTO: 2.3 %
FERRITIN SERPL-MCNC: 1455 NG/ML
FERRITIN SERPL-MCNC: 2499 NG/ML
FERRITIN SERPL-MCNC: 852 NG/ML
GLUCOSE SERPL-MCNC: 106 MG/DL
GLUCOSE SERPL-MCNC: 120 MG/DL
HCT VFR BLD CALC: 36.6 %
HCT VFR BLD CALC: 41.4 %
HGB BLD-MCNC: 11.6 G/DL
HGB BLD-MCNC: 13.3 G/DL
IMM GRANULOCYTES NFR BLD AUTO: 0.3 %
IMM GRANULOCYTES NFR BLD AUTO: 0.3 %
IRON SATN MFR SERPL: 38 %
IRON SATN MFR SERPL: 44 %
IRON SERPL-MCNC: 106 UG/DL
IRON SERPL-MCNC: 80 UG/DL
IRON SERPL-MCNC: 83 UG/DL
LYMPHOCYTES # BLD AUTO: 1.32 K/UL
LYMPHOCYTES # BLD AUTO: 1.95 K/UL
LYMPHOCYTES NFR BLD AUTO: 32.2 %
LYMPHOCYTES NFR BLD AUTO: 33.8 %
MAGNESIUM SERPL-MCNC: 2 MG/DL
MAN DIFF?: NORMAL
MAN DIFF?: NORMAL
MCHC RBC-ENTMCNC: 30.5 PG
MCHC RBC-ENTMCNC: 31.7 G/DL
MCHC RBC-ENTMCNC: 32.1 G/DL
MCHC RBC-ENTMCNC: 32.2 PG
MCV RBC AUTO: 100.2 FL
MCV RBC AUTO: 96.3 FL
MONOCYTES # BLD AUTO: 0.35 K/UL
MONOCYTES # BLD AUTO: 0.6 K/UL
MONOCYTES NFR BLD AUTO: 9 %
MONOCYTES NFR BLD AUTO: 9.9 %
NEUTROPHILS # BLD AUTO: 2.1 K/UL
NEUTROPHILS # BLD AUTO: 3.3 K/UL
NEUTROPHILS NFR BLD AUTO: 53.6 %
NEUTROPHILS NFR BLD AUTO: 54.7 %
PLATELET # BLD AUTO: 154 K/UL
PLATELET # BLD AUTO: 160 K/UL
POTASSIUM SERPL-SCNC: 3.6 MMOL/L
POTASSIUM SERPL-SCNC: 4.6 MMOL/L
RBC # BLD: 3.8 M/UL
RBC # BLD: 4.13 M/UL
RBC # FLD: 15 %
RBC # FLD: 19 %
SODIUM SERPL-SCNC: 139 MMOL/L
SODIUM SERPL-SCNC: 139 MMOL/L
TIBC SERPL-MCNC: 218 UG/DL
TIBC SERPL-MCNC: 242 UG/DL
TRANSFERRIN SERPL-MCNC: 179 MG/DL
TRANSFERRIN SERPL-MCNC: 192 MG/DL
TRANSFERRIN SERPL-MCNC: 194 MG/DL
UIBC SERPL-MCNC: 135 UG/DL
UIBC SERPL-MCNC: 136 UG/DL
WBC # FLD AUTO: 3.91 K/UL
WBC # FLD AUTO: 6.05 K/UL

## 2022-11-16 ENCOUNTER — APPOINTMENT (OUTPATIENT)
Dept: CARDIOLOGY | Facility: CLINIC | Age: 87
End: 2022-11-16

## 2022-11-16 VITALS
HEIGHT: 58 IN | SYSTOLIC BLOOD PRESSURE: 102 MMHG | DIASTOLIC BLOOD PRESSURE: 64 MMHG | HEART RATE: 54 BPM | OXYGEN SATURATION: 96 % | WEIGHT: 167.3 LBS | BODY MASS INDEX: 35.12 KG/M2

## 2022-11-16 PROCEDURE — 99214 OFFICE O/P EST MOD 30 MIN: CPT

## 2022-11-16 PROCEDURE — 93000 ELECTROCARDIOGRAM COMPLETE: CPT

## 2022-11-17 LAB
ANION GAP SERPL CALC-SCNC: 13 MMOL/L
BUN SERPL-MCNC: 40 MG/DL
CALCIUM SERPL-MCNC: 9.8 MG/DL
CHLORIDE SERPL-SCNC: 101 MMOL/L
CO2 SERPL-SCNC: 27 MMOL/L
CREAT SERPL-MCNC: 1.2 MG/DL
EGFR: 44 ML/MIN/1.73M2
GLUCOSE SERPL-MCNC: 90 MG/DL
POTASSIUM SERPL-SCNC: 5.5 MMOL/L
SODIUM SERPL-SCNC: 141 MMOL/L

## 2022-11-18 RX ORDER — PANTOPRAZOLE 40 MG/1
40 TABLET, DELAYED RELEASE ORAL DAILY
Refills: 0 | Status: ACTIVE | COMMUNITY

## 2022-11-18 NOTE — PLAN
[FreeTextEntry1] : HFpEF/A-Flutter / non compliance with diet\par \par Fluid overloaded on exam\par Increase Furosemide to  5 times/week If weight gain of 2 lbs or more in a day, take a second tablet for that day \par Continue Metoprolol 12.5 mg 5 days /week \par Continue Eliquis 2.5 mg BID\par Counseling regarding importance of low sodium diet provided \par Blood work today\par RTO 4 months  \par \par \par \par Andrew Byrnes MD, FACC, FHFSA \par Advanced Heart Failure/ Mechanical Circulatory Support\par Pulmonary Hypertension and Cardiac Amyloidosis \par [facility]\par [facility]  \par \par

## 2022-11-18 NOTE — HISTORY OF PRESENT ILLNESS
[FreeTextEntry1] : 88 F PMH HFpEF, Afib eliquis on hold , Anemia suspected to be secondary to GIB ( but never proven s/p neg EGD and C-scope), HTN, and HLD, R. femur Fx s/p ORIF. She is here for a follow-up. \par \par The patient reports she is doing well, has no limitations on her ET. Has noted BLE edema that worsens at the end of the day. Her SBP at home fluctuates from the 100s to the 140s, and her weight ranges from 166-169 lbs. The patient denies CP, bleeding, SOB at rest, PND, abdominal discomfort, LH/dizziness, BLE edema, palpitations, and syncope.  She does not restrict fluid or salt strictly and takes her medications as directed. \par \par \par

## 2022-12-28 LAB
ANION GAP SERPL CALC-SCNC: 9 MMOL/L
BUN SERPL-MCNC: 35 MG/DL
CALCIUM SERPL-MCNC: 9.4 MG/DL
CHLORIDE SERPL-SCNC: 101 MMOL/L
CO2 SERPL-SCNC: 30 MMOL/L
CREAT SERPL-MCNC: 1.1 MG/DL
EGFR: 48 ML/MIN/1.73M2
GLUCOSE SERPL-MCNC: 116 MG/DL
POTASSIUM SERPL-SCNC: 4.8 MMOL/L
SODIUM SERPL-SCNC: 140 MMOL/L

## 2023-03-08 ENCOUNTER — APPOINTMENT (OUTPATIENT)
Dept: CARDIOLOGY | Facility: CLINIC | Age: 88
End: 2023-03-08
Payer: MEDICARE

## 2023-03-08 VITALS
HEART RATE: 54 BPM | HEIGHT: 58 IN | WEIGHT: 166.6 LBS | OXYGEN SATURATION: 98 % | SYSTOLIC BLOOD PRESSURE: 106 MMHG | DIASTOLIC BLOOD PRESSURE: 70 MMHG | BODY MASS INDEX: 34.97 KG/M2

## 2023-03-08 DIAGNOSIS — E78.00 PURE HYPERCHOLESTEROLEMIA, UNSPECIFIED: ICD-10-CM

## 2023-03-08 PROCEDURE — 93000 ELECTROCARDIOGRAM COMPLETE: CPT

## 2023-03-08 PROCEDURE — 99214 OFFICE O/P EST MOD 30 MIN: CPT

## 2023-03-08 RX ORDER — METOPROLOL SUCCINATE 25 MG/1
25 TABLET, EXTENDED RELEASE ORAL
Qty: 45 | Refills: 3 | Status: ACTIVE | COMMUNITY
Start: 2021-06-01 | End: 1900-01-01

## 2023-03-08 RX ORDER — APIXABAN 2.5 MG/1
2.5 TABLET, FILM COATED ORAL
Qty: 60 | Refills: 3 | Status: ACTIVE | COMMUNITY
Start: 2022-05-26 | End: 1900-01-01

## 2023-03-20 PROBLEM — E78.00 HYPERCHOLESTEROLEMIA: Status: ACTIVE | Noted: 2018-04-11

## 2023-03-20 NOTE — PLAN
[FreeTextEntry1] : HFpEF/A-Flutter / non compliance with diet\par \par Euvolemic on exam\par NYHA class II\par Continue Torsemide 10mg 5 days / week \par Continue Metoprolol 12.5 mg 5 days /week \par Continue Eliquis 2.5 mg BID\par Counseling regarding importance of low sodium diet provided \par Follow up with PMD\par RTO 6 months  \par \par \par \par Andrew Byrnes MD, FACC, FSA \par Advanced Heart Failure/ Mechanical Circulatory Support\par Pulmonary Hypertension and Cardiac Amyloidosis \par [facility]\par [facility]  \par \par

## 2023-03-20 NOTE — HISTORY OF PRESENT ILLNESS
[FreeTextEntry1] : 88 F PMH HFpEF, Afib eliquis on hold , Anemia suspected to be secondary to GIB ( but never proven s/p neg EGD and C-scope), HTN, and HLD, R. femur Fx s/p ORIF. She is here for a follow-up. \par \par The patient reports she is doing well, has no limitations on her ET. Has noted BLE edema worse on the left leg that worsens at the end of the day and is usually normal in the morning. Her SBP at home fluctuates from the 100s to the 130s, and her weight is usually 165lbs but has been eating badly lately. The patient denies CP, bleeding, SOB at rest, PND, abdominal discomfort, LH/dizziness, BLE edema, palpitations, and syncope.  She does not restrict fluid or salt strictly and takes her medications as directed. Patient started on iron by PMD. \par Pt takes torsemide 5 days a week. \par \par BUN: 38\par CR: 1.15 \par GFR: 46\par \par

## 2023-09-06 ENCOUNTER — APPOINTMENT (OUTPATIENT)
Dept: CARDIOLOGY | Facility: CLINIC | Age: 88
End: 2023-09-06
Payer: MEDICARE

## 2023-09-06 VITALS
HEART RATE: 59 BPM | SYSTOLIC BLOOD PRESSURE: 100 MMHG | OXYGEN SATURATION: 90 % | BODY MASS INDEX: 34.43 KG/M2 | HEIGHT: 58 IN | DIASTOLIC BLOOD PRESSURE: 58 MMHG | WEIGHT: 164 LBS

## 2023-09-06 DIAGNOSIS — D50.9 IRON DEFICIENCY ANEMIA, UNSPECIFIED: ICD-10-CM

## 2023-09-06 PROCEDURE — 93000 ELECTROCARDIOGRAM COMPLETE: CPT

## 2023-09-06 PROCEDURE — 99214 OFFICE O/P EST MOD 30 MIN: CPT

## 2023-09-08 PROBLEM — D50.9 IRON DEFICIENCY ANEMIA: Status: ACTIVE | Noted: 2022-01-25

## 2023-09-08 NOTE — HISTORY OF PRESENT ILLNESS
[FreeTextEntry1] : 88 Female with a PMHx HFpEF, Afib, Anemia suspected to be secondary to GIB, HTN, and HLD, R. femur Fx s/p ORIF. She is here for a follow-up.   The patient reports she is doing well; as per her son in the room, she is not very active and gets SOB when walking less than 50 feet. Her SBP at home fluctuates from the 110s to the 120s, and her weight is usually 163 lbs. The patient denies CP, bleeding, SOB at rest, PND, abdominal discomfort, LH/dizziness, BLE edema, palpitations, and syncope. She does not restrict fluid or salt strictly and takes her medications as directed.

## 2023-09-08 NOTE — PLAN
[FreeTextEntry1] : HFpEF/A-Flutter / noncompliance with diet  NYHA class III  Euvolemic on exam Continue Torsemide 10mg 5 days / week  Continue Metoprolol 12.5 mg 5 days / week  Continue Eliquis 2.5 mg BID Counseling regarding importance of low sodium diet provided  Follow up with PMD RTO 6 months      Andrew Byrnes MD, FAC, [facility]  Advanced Heart Failure/ Mechanical Circulatory Support Pulmonary Hypertension and Cardiac Amyloidosis  [facility]

## 2023-12-03 NOTE — CHART NOTE - NSCHARTNOTEFT_GEN_A_CORE
I had several conversations with family througout the day. There was concern with restarting Eliquis in setting of anemia with unclear etiology. Family aware of risk of stroke but are more concerned of risk of bleeding. Family opted to hold Eliquis.
None

## 2024-03-27 ENCOUNTER — APPOINTMENT (OUTPATIENT)
Dept: CARDIOLOGY | Facility: CLINIC | Age: 89
End: 2024-03-27
Payer: MEDICARE

## 2024-03-27 VITALS
WEIGHT: 169 LBS | SYSTOLIC BLOOD PRESSURE: 106 MMHG | HEIGHT: 58 IN | OXYGEN SATURATION: 96 % | BODY MASS INDEX: 35.48 KG/M2 | HEART RATE: 64 BPM | DIASTOLIC BLOOD PRESSURE: 66 MMHG

## 2024-03-27 DIAGNOSIS — I48.91 UNSPECIFIED ATRIAL FIBRILLATION: ICD-10-CM

## 2024-03-27 DIAGNOSIS — I48.92 UNSPECIFIED ATRIAL FLUTTER: ICD-10-CM

## 2024-03-27 DIAGNOSIS — I50.30 UNSPECIFIED DIASTOLIC (CONGESTIVE) HEART FAILURE: ICD-10-CM

## 2024-03-27 DIAGNOSIS — I10 ESSENTIAL (PRIMARY) HYPERTENSION: ICD-10-CM

## 2024-03-27 PROCEDURE — 99214 OFFICE O/P EST MOD 30 MIN: CPT

## 2024-03-27 PROCEDURE — 93308 TTE F-UP OR LMTD: CPT

## 2024-03-27 PROCEDURE — 93000 ELECTROCARDIOGRAM COMPLETE: CPT

## 2024-03-27 PROCEDURE — G2211 COMPLEX E/M VISIT ADD ON: CPT

## 2024-03-27 RX ORDER — PREDNISONE 20 MG/1
20 TABLET ORAL
Qty: 13 | Refills: 0 | Status: DISCONTINUED | COMMUNITY
Start: 2023-07-17 | End: 2024-03-27

## 2024-03-27 RX ORDER — DICLOFENAC SODIUM 1% 10 MG/G
1 GEL TOPICAL
Qty: 100 | Refills: 0 | Status: DISCONTINUED | COMMUNITY
Start: 2022-10-05 | End: 2024-03-27

## 2024-03-27 RX ORDER — BENZONATATE 200 MG/1
200 CAPSULE ORAL
Qty: 30 | Refills: 0 | Status: DISCONTINUED | COMMUNITY
Start: 2023-07-05 | End: 2024-03-27

## 2024-03-27 RX ORDER — PROMETHAZINE HYDROCHLORIDE 6.25 MG/5ML
6.25 SOLUTION ORAL
Qty: 70 | Refills: 0 | Status: DISCONTINUED | COMMUNITY
Start: 2023-05-16 | End: 2024-03-27

## 2024-04-09 NOTE — HISTORY OF PRESENT ILLNESS
[FreeTextEntry1] : 89 Female with a PMHx HFpEF, Afib, Anemia suspected to be secondary to GIB, HTN, and HLD, R. femur Fx s/p ORIF. She is here for a follow-up.   Patient reports doing well, she walks around at home and attends social gatherings. No change in her weight. She denies any N/V, chest pain, LOC.

## 2024-04-09 NOTE — ASSESSMENT
[FreeTextEntry1] : HFpEF/A-Flutter / noncompliance with diet   NYHA class II Euvolemic on exam POCUS shows IVC 1.2 cm and collapsing >50%  ECG today showed afib with controlled rate  Continue Torsemide 10mg 5 days / week  Continue Metoprolol 12.5 mg 5 days / week  Start amiodarone 200 mg BID for one week then once per day Continue Eliquis 2.5 mg BID Counseling regarding importance of low sodium diet provided  Follow up with PMD Labs before next appt RTO 2 months to reassess rhythm and get TTE      Andrew Byrnes MD, FACC, FSA  Advanced Heart Failure/ Mechanical Circulatory Support Pulmonary Hypertension and Cardiac Amyloidosis  [facility]

## 2024-04-15 NOTE — PATIENT PROFILE ADULT - FALL HARM RISK - CONCLUSION
[No Acute Distress] : no acute distress [Well Nourished] : well nourished [Well Developed] : well developed [Well-Appearing] : well-appearing [Normal Sclera/Conjunctiva] : normal sclera/conjunctiva [Normal Outer Ear/Nose] : the outer ears and nose were normal in appearance [No JVD] : no jugular venous distention [No Lymphadenopathy] : no lymphadenopathy [Supple] : supple [Thyroid Normal, No Nodules] : the thyroid was normal and there were no nodules present [No Respiratory Distress] : no respiratory distress  [No Accessory Muscle Use] : no accessory muscle use [Clear to Auscultation] : lungs were clear to auscultation bilaterally [Normal Rate] : normal rate  [Regular Rhythm] : with a regular rhythm [Normal S1, S2] : normal S1 and S2 [No Murmur] : no murmur heard [Pedal Pulses Present] : the pedal pulses are present [No Edema] : there was no peripheral edema [No Extremity Clubbing/Cyanosis] : no extremity clubbing/cyanosis [Soft] : abdomen soft [Non Tender] : non-tender [Non-distended] : non-distended [Normal Posterior Cervical Nodes] : no posterior cervical lymphadenopathy [Normal Anterior Cervical Nodes] : no anterior cervical lymphadenopathy [No CVA Tenderness] : no CVA  tenderness [No Spinal Tenderness] : no spinal tenderness [No Joint Swelling] : no joint swelling [Grossly Normal Strength/Tone] : grossly normal strength/tone [No Rash] : no rash [Coordination Grossly Intact] : coordination grossly intact [No Focal Deficits] : no focal deficits [Normal Gait] : normal gait [Normal Affect] : the affect was normal [Normal Insight/Judgement] : insight and judgment were intact Fall with Harm Risk

## 2024-05-22 ENCOUNTER — APPOINTMENT (OUTPATIENT)
Dept: CARDIOLOGY | Facility: CLINIC | Age: 89
End: 2024-05-22
Payer: MEDICARE

## 2024-05-22 ENCOUNTER — APPOINTMENT (OUTPATIENT)
Dept: HEART FAILURE | Facility: CLINIC | Age: 89
End: 2024-05-22
Payer: MEDICARE

## 2024-05-22 VITALS
HEART RATE: 55 BPM | SYSTOLIC BLOOD PRESSURE: 100 MMHG | WEIGHT: 168 LBS | OXYGEN SATURATION: 98 % | BODY MASS INDEX: 35.26 KG/M2 | DIASTOLIC BLOOD PRESSURE: 62 MMHG | HEIGHT: 58 IN

## 2024-05-22 PROCEDURE — G2211 COMPLEX E/M VISIT ADD ON: CPT

## 2024-05-22 PROCEDURE — 93306 TTE W/DOPPLER COMPLETE: CPT

## 2024-05-22 PROCEDURE — 93000 ELECTROCARDIOGRAM COMPLETE: CPT

## 2024-05-22 PROCEDURE — 99214 OFFICE O/P EST MOD 30 MIN: CPT

## 2024-05-22 RX ORDER — TORSEMIDE 10 MG/1
10 TABLET ORAL
Qty: 90 | Refills: 4 | Status: ACTIVE | COMMUNITY
Start: 1900-01-01 | End: 1900-01-01

## 2024-05-22 RX ORDER — DAPAGLIFLOZIN 10 MG/1
10 TABLET, FILM COATED ORAL DAILY
Qty: 30 | Refills: 2 | Status: ACTIVE | COMMUNITY
Start: 2024-05-22 | End: 1900-01-01

## 2024-05-22 RX ORDER — AMIODARONE HYDROCHLORIDE 100 MG/1
100 TABLET ORAL DAILY
Qty: 90 | Refills: 3 | Status: ACTIVE | COMMUNITY
Start: 2024-03-27 | End: 1900-01-01

## 2024-06-01 NOTE — ASSESSMENT
[FreeTextEntry1] : HFpEF/A-Flutter / not fully compliance with diet  NYHA class II Euvolemic on exam POCUS shows IVC <2 cm and collapsing   ECG showed sinus rhythm   Decrease torsemide to 10mg 2 days / week Start Farxiga 10 mg given A1c of 6.0 and Creat 1.4, this will allow to decrease maintenance dose of torsemide  Continue Metoprolol 12.5 mg 5 days / week  Decrease amiodarone to 100 mg daily Continue Eliquis 2.5 mg BID Counseling regarding importance of low sodium diet provided  Follow up with PMD Repeat labs for kidney function before next appt RTO in 6-8 weeks       Andrew Byrnes MD, FACC, [facility]  Advanced Heart Failure/ Mechanical Circulatory Support Pulmonary Hypertension and Cardiac Amyloidosis  [facility]

## 2024-06-01 NOTE — HISTORY OF PRESENT ILLNESS
[FreeTextEntry1] : 89 Female with a PMHx HFpEF, Afib, Anemia suspected to be secondary to GIB, HTN, and HLD, R. femur Fx s/p ORIF. She is here for a follow-up.   Patient reports doing well, she walks around at home and attends social gatherings. On the previous visit, she was found to be in afib with RVR. She was started on amiodarone. Today's ECG shows she converted to sinus rhythm. No change in her weight. She denies any N/V, chest pain, LOC.   Blood work done May 10th showed Creat 1.4 and BUN 42, Hba1c 6.0

## 2024-06-08 NOTE — DISCHARGE NOTE NURSING/CASE MANAGEMENT/SOCIAL WORK - NSTRANSFERBELONGINGSDISPO_GEN_A_NUR
EDPD Note: Lab/Chart Reviewed    Reviewed Ms. Carolina Chowdhury 's chart regarding a multiple organisms present, probable contamination urine culture/result that was taken during their recent emergency room visit. Influenza and Sars-Cov-2 tests returned normal. The patient was transferred back to their rehab/LTC facility .Therefore, I contacted [facility] by calling [phone] to obtain a good fax number to forward results ([phone]). Patient discharged with a prescription for cephalexin 250 mg/ 5 mL oral suspension: Take 10 mL by g-tube BID x 7 days. Remainder of patient's care to be managed by SNF.       Contains abnormal data Urine Culture  Order: [id number] - Reflex for Order [id number]   Collected 6/5/2024 15:43       Status: Final result       Visible to patient: No (inaccessible in [facility])    Specimen Information: Clean Catch/Voided; Urine   1 Result Note       1 Follow-up Encounter  Urine Culture Multiple organisms present, probable contamination. Repeat culture if clinically indicated. Abnormal            Resulting Agency: [facility]           Specimen Collected: 06/05/24 15:43 Last Resulted: 06/07/24 08:37               No further follow up needed from EDPD Team.     Tila Schaefer, YoungD       
with patient

## 2024-08-14 ENCOUNTER — APPOINTMENT (OUTPATIENT)
Dept: HEART FAILURE | Facility: CLINIC | Age: 89
End: 2024-08-14

## 2024-08-14 VITALS
HEART RATE: 51 BPM | OXYGEN SATURATION: 99 % | WEIGHT: 168 LBS | BODY MASS INDEX: 35.26 KG/M2 | DIASTOLIC BLOOD PRESSURE: 68 MMHG | SYSTOLIC BLOOD PRESSURE: 122 MMHG | HEIGHT: 58 IN

## 2024-08-14 DIAGNOSIS — I50.30 UNSPECIFIED DIASTOLIC (CONGESTIVE) HEART FAILURE: ICD-10-CM

## 2024-08-14 PROCEDURE — 99214 OFFICE O/P EST MOD 30 MIN: CPT

## 2024-08-14 PROCEDURE — 93308 TTE F-UP OR LMTD: CPT

## 2024-08-14 PROCEDURE — G2211 COMPLEX E/M VISIT ADD ON: CPT

## 2024-08-14 PROCEDURE — 93000 ELECTROCARDIOGRAM COMPLETE: CPT

## 2024-08-14 RX ORDER — EMPAGLIFLOZIN 10 MG/1
10 TABLET, FILM COATED ORAL DAILY
Qty: 10 | Refills: 3 | Status: ACTIVE | COMMUNITY
Start: 2024-08-14 | End: 1900-01-01

## 2024-08-14 NOTE — HISTORY OF PRESENT ILLNESS
[FreeTextEntry1] : 89 Female with a PMHx HFpEF, Afib, Anemia suspected to be secondary to GIB, HTN, and HLD, R. femur Fx s/p ORIF. She is here for a follow-up.   Patient reports doing well, she walks around at home and attends social gatherings. On the previous visit, she was found to be in afib with RVR. She was started on amiodarone. Today's ECG shows she converted to sinus rhythm. No change in her weight. She denies any N/V, chest pain, LOC.   Blood work done May 10th showed Creat 1.4 and BUN 42, Hba1c 6.0    8/14 - Patient coming for routine visit. She reports she's doing well, no c/o SOB, N/V, LOC, chest pain. She is compliant with meds and low sodium diet. She was started on Farxiga but developed a rash and had to stop it.

## 2024-08-14 NOTE — ASSESSMENT
[FreeTextEntry1] : HFpEF/A-Flutter   NYHA class II Euvolemic on exam POCUS shows IVC <2 cm and collapsing   ECG showed sinus rhythm   Continue torsemide to 10mg 2 days / week Start Jardiance 10 mg daily  Continue Metoprolol 12.5 mg 5 days / week  Decrease amiodarone to 100 mg every other daily Continue Eliquis 2.5 mg BID Counseling regarding importance of low sodium diet provided  Follow up with PMD Repeat labs for kidney function before next appt RTO in 6 months     Andrew Byrnes MD, FACC, FSA  Advanced Heart Failure/ Mechanical Circulatory Support Pulmonary Hypertension and Cardiac Amyloidosis  [facility]

## 2024-08-23 ENCOUNTER — NON-APPOINTMENT (OUTPATIENT)
Age: 89
End: 2024-08-23

## 2025-02-05 ENCOUNTER — APPOINTMENT (OUTPATIENT)
Dept: HEART FAILURE | Facility: CLINIC | Age: 89
End: 2025-02-05
Payer: MEDICARE

## 2025-02-05 ENCOUNTER — NON-APPOINTMENT (OUTPATIENT)
Age: 89
End: 2025-02-05

## 2025-02-05 VITALS
DIASTOLIC BLOOD PRESSURE: 64 MMHG | OXYGEN SATURATION: 98 % | HEIGHT: 58 IN | BODY MASS INDEX: 35.48 KG/M2 | SYSTOLIC BLOOD PRESSURE: 116 MMHG | HEART RATE: 59 BPM | WEIGHT: 169 LBS

## 2025-02-05 DIAGNOSIS — I50.30 UNSPECIFIED DIASTOLIC (CONGESTIVE) HEART FAILURE: ICD-10-CM

## 2025-02-05 DIAGNOSIS — I48.91 UNSPECIFIED ATRIAL FIBRILLATION: ICD-10-CM

## 2025-02-05 DIAGNOSIS — E78.00 PURE HYPERCHOLESTEROLEMIA, UNSPECIFIED: ICD-10-CM

## 2025-02-05 LAB
ALBUMIN SERPL ELPH-MCNC: 4.2 G/DL
ALP BLD-CCNC: 128 U/L
ALT SERPL-CCNC: 9 U/L
ANION GAP SERPL CALC-SCNC: 11 MMOL/L
AST SERPL-CCNC: 17 U/L
BILIRUB SERPL-MCNC: 0.4 MG/DL
BUN SERPL-MCNC: 60 MG/DL
CALCIUM SERPL-MCNC: 9.9 MG/DL
CHLORIDE SERPL-SCNC: 101 MMOL/L
CO2 SERPL-SCNC: 26 MMOL/L
CREAT SERPL-MCNC: 1.4 MG/DL
EGFR: 36 ML/MIN/1.73M2
GLUCOSE SERPL-MCNC: 108 MG/DL
NT-PROBNP SERPL-MCNC: 719 PG/ML
POTASSIUM SERPL-SCNC: 5.5 MMOL/L
PROT SERPL-MCNC: 6.9 G/DL
SODIUM SERPL-SCNC: 138 MMOL/L
TSH SERPL-ACNC: 3.72 UIU/ML

## 2025-02-05 PROCEDURE — 93000 ELECTROCARDIOGRAM COMPLETE: CPT

## 2025-02-05 PROCEDURE — 99215 OFFICE O/P EST HI 40 MIN: CPT

## 2025-02-05 PROCEDURE — G2211 COMPLEX E/M VISIT ADD ON: CPT

## 2025-02-25 ENCOUNTER — APPOINTMENT (OUTPATIENT)
Age: 89
End: 2025-02-25
Payer: MEDICARE

## 2025-02-25 DIAGNOSIS — M20.41 OTHER HAMMER TOE(S) (ACQUIRED), RIGHT FOOT: ICD-10-CM

## 2025-02-25 DIAGNOSIS — I87.2 VENOUS INSUFFICIENCY (CHRONIC) (PERIPHERAL): ICD-10-CM

## 2025-02-25 DIAGNOSIS — M20.42 OTHER HAMMER TOE(S) (ACQUIRED), RIGHT FOOT: ICD-10-CM

## 2025-02-25 DIAGNOSIS — M79.674 TINEA UNGUIUM: ICD-10-CM

## 2025-02-25 DIAGNOSIS — B35.1 TINEA UNGUIUM: ICD-10-CM

## 2025-02-25 DIAGNOSIS — M20.11 HALLUX VALGUS (ACQUIRED), RIGHT FOOT: ICD-10-CM

## 2025-02-25 DIAGNOSIS — M20.12 HALLUX VALGUS (ACQUIRED), RIGHT FOOT: ICD-10-CM

## 2025-02-25 DIAGNOSIS — M79.675 TINEA UNGUIUM: ICD-10-CM

## 2025-02-25 PROCEDURE — 17110 DESTRUCTION B9 LES UP TO 14: CPT | Mod: 59

## 2025-02-25 PROCEDURE — 11721 DEBRIDE NAIL 6 OR MORE: CPT | Mod: 59

## 2025-02-25 PROCEDURE — 99213 OFFICE O/P EST LOW 20 MIN: CPT | Mod: 25

## 2025-02-25 PROCEDURE — 11055 PARING/CUTG B9 HYPRKER LES 1: CPT

## 2025-04-29 ENCOUNTER — APPOINTMENT (OUTPATIENT)
Age: 89
End: 2025-04-29
Payer: MEDICARE

## 2025-04-29 DIAGNOSIS — B35.1 TINEA UNGUIUM: ICD-10-CM

## 2025-04-29 DIAGNOSIS — M79.674 TINEA UNGUIUM: ICD-10-CM

## 2025-04-29 DIAGNOSIS — L60.2 ONYCHOGRYPHOSIS: ICD-10-CM

## 2025-04-29 DIAGNOSIS — M79.675 TINEA UNGUIUM: ICD-10-CM

## 2025-04-29 PROCEDURE — 99213 OFFICE O/P EST LOW 20 MIN: CPT | Mod: 25

## 2025-04-29 PROCEDURE — 11721 DEBRIDE NAIL 6 OR MORE: CPT

## 2025-06-30 NOTE — ASU PATIENT PROFILE, ADULT - NS PRO TALK SOMEONE YN
Pt has taken her oxygen off twice and has dropped to 75%. Patient has been educated on the importance of keeping her nasal cannula on    no

## 2025-07-01 ENCOUNTER — APPOINTMENT (OUTPATIENT)
Age: 89
End: 2025-07-01
Payer: MEDICARE

## 2025-07-01 PROCEDURE — 11721 DEBRIDE NAIL 6 OR MORE: CPT

## 2025-07-01 PROCEDURE — 99213 OFFICE O/P EST LOW 20 MIN: CPT | Mod: 25

## 2025-07-02 PROBLEM — M79.672 ACUTE PAIN OF LEFT FOOT: Status: ACTIVE | Noted: 2025-07-02

## 2025-07-02 PROBLEM — M79.671 ACUTE PAIN OF RIGHT FOOT: Status: ACTIVE | Noted: 2025-07-02

## 2025-08-11 ENCOUNTER — NON-APPOINTMENT (OUTPATIENT)
Age: 89
End: 2025-08-11

## 2025-08-13 ENCOUNTER — APPOINTMENT (OUTPATIENT)
Dept: HEART FAILURE | Facility: CLINIC | Age: 89
End: 2025-08-13

## 2025-08-13 VITALS
HEART RATE: 53 BPM | DIASTOLIC BLOOD PRESSURE: 72 MMHG | OXYGEN SATURATION: 97 % | BODY MASS INDEX: 36.53 KG/M2 | WEIGHT: 174 LBS | SYSTOLIC BLOOD PRESSURE: 118 MMHG | HEIGHT: 58 IN

## 2025-08-13 PROCEDURE — G2211 COMPLEX E/M VISIT ADD ON: CPT

## 2025-08-13 PROCEDURE — 93000 ELECTROCARDIOGRAM COMPLETE: CPT

## 2025-08-13 PROCEDURE — 99204 OFFICE O/P NEW MOD 45 MIN: CPT

## 2025-08-13 RX ORDER — DOCUSATE SODIUM 100 MG/1
CAPSULE ORAL TWICE DAILY
Refills: 0 | Status: ACTIVE | COMMUNITY

## 2025-09-17 ENCOUNTER — APPOINTMENT (OUTPATIENT)
Dept: CARDIOLOGY | Facility: CLINIC | Age: 89
End: 2025-09-17
Payer: MEDICARE

## 2025-09-17 PROCEDURE — 93306 TTE W/DOPPLER COMPLETE: CPT
